# Patient Record
Sex: FEMALE | Race: WHITE | Employment: OTHER | URBAN - METROPOLITAN AREA
[De-identification: names, ages, dates, MRNs, and addresses within clinical notes are randomized per-mention and may not be internally consistent; named-entity substitution may affect disease eponyms.]

---

## 2018-08-15 ENCOUNTER — HOSPITAL ENCOUNTER (OUTPATIENT)
Age: 68
Discharge: HOME | End: 2018-08-17
Payer: MEDICARE

## 2018-08-15 ENCOUNTER — HOSPITAL ENCOUNTER (OUTPATIENT)
Dept: GENERAL RADIOLOGY | Age: 68
Discharge: HOME | End: 2018-08-17
Payer: MEDICARE

## 2018-08-15 DIAGNOSIS — M54.40 ACUTE RIGHT-SIDED LOW BACK PAIN WITH SCIATICA, SCIATICA LATERALITY UNSPECIFIED: ICD-10-CM

## 2018-08-15 PROCEDURE — 72110 X-RAY EXAM L-2 SPINE 4/>VWS: CPT

## 2021-03-09 ENCOUNTER — IMMUNIZATION (OUTPATIENT)
Dept: PRIMARY CARE CLINIC | Age: 71
End: 2021-03-09
Payer: MEDICARE

## 2021-03-09 PROCEDURE — 0011A COVID-19, MODERNA VACCINE 100MCG/0.5ML DOSE: CPT | Performed by: NURSE PRACTITIONER

## 2021-03-09 PROCEDURE — 91301 COVID-19, MODERNA VACCINE 100MCG/0.5ML DOSE: CPT | Performed by: NURSE PRACTITIONER

## 2021-04-06 ENCOUNTER — IMMUNIZATION (OUTPATIENT)
Dept: PRIMARY CARE CLINIC | Age: 71
End: 2021-04-06
Payer: MEDICARE

## 2021-04-06 PROCEDURE — 91301 COVID-19, MODERNA VACCINE 100MCG/0.5ML DOSE: CPT | Performed by: NURSE PRACTITIONER

## 2021-04-06 PROCEDURE — 0012A COVID-19, MODERNA VACCINE 100MCG/0.5ML DOSE: CPT | Performed by: NURSE PRACTITIONER

## 2022-11-09 ENCOUNTER — HOSPITAL ENCOUNTER (EMERGENCY)
Age: 72
Discharge: HOME | End: 2022-11-09
Attending: EMERGENCY MEDICINE
Payer: MEDICARE

## 2022-11-09 ENCOUNTER — APPOINTMENT (OUTPATIENT)
Dept: CT IMAGING | Age: 72
End: 2022-11-09
Payer: MEDICARE

## 2022-11-09 ENCOUNTER — APPOINTMENT (OUTPATIENT)
Dept: GENERAL RADIOLOGY | Age: 72
End: 2022-11-09
Payer: MEDICARE

## 2022-11-09 VITALS
OXYGEN SATURATION: 97 % | HEART RATE: 90 BPM | WEIGHT: 130 LBS | SYSTOLIC BLOOD PRESSURE: 134 MMHG | HEIGHT: 64 IN | TEMPERATURE: 97.4 F | DIASTOLIC BLOOD PRESSURE: 74 MMHG | BODY MASS INDEX: 22.2 KG/M2 | RESPIRATION RATE: 14 BRPM

## 2022-11-09 DIAGNOSIS — S39.012A STRAIN OF LUMBAR REGION, INITIAL ENCOUNTER: Primary | ICD-10-CM

## 2022-11-09 DIAGNOSIS — M51.36 DDD (DEGENERATIVE DISC DISEASE), LUMBAR: ICD-10-CM

## 2022-11-09 LAB
BILIRUBIN URINE: ABNORMAL
BLOOD, URINE: NEGATIVE
CLARITY: CLEAR
COLOR: YELLOW
GLUCOSE URINE: NEGATIVE MG/DL
KETONES, URINE: >=80 MG/DL
LEUKOCYTE ESTERASE, URINE: NEGATIVE
NITRITE, URINE: NEGATIVE
PH UA: 6 (ref 5–9)
PROTEIN UA: NEGATIVE MG/DL
SPECIFIC GRAVITY UA: >=1.03 (ref 1–1.03)
UROBILINOGEN, URINE: 0.2 E.U./DL

## 2022-11-09 PROCEDURE — 81003 URINALYSIS AUTO W/O SCOPE: CPT

## 2022-11-09 PROCEDURE — 87088 URINE BACTERIA CULTURE: CPT

## 2022-11-09 PROCEDURE — 72131 CT LUMBAR SPINE W/O DYE: CPT

## 2022-11-09 PROCEDURE — 96372 THER/PROPH/DIAG INJ SC/IM: CPT

## 2022-11-09 PROCEDURE — 73502 X-RAY EXAM HIP UNI 2-3 VIEWS: CPT

## 2022-11-09 PROCEDURE — 74176 CT ABD & PELVIS W/O CONTRAST: CPT

## 2022-11-09 PROCEDURE — 99284 EMERGENCY DEPT VISIT MOD MDM: CPT

## 2022-11-09 PROCEDURE — 6370000000 HC RX 637 (ALT 250 FOR IP)

## 2022-11-09 PROCEDURE — 6360000002 HC RX W HCPCS

## 2022-11-09 RX ORDER — MELOXICAM 7.5 MG/1
7.5 TABLET ORAL DAILY
Qty: 90 TABLET | Refills: 0 | Status: SHIPPED | OUTPATIENT
Start: 2022-11-09 | End: 2022-11-22

## 2022-11-09 RX ORDER — ORPHENADRINE CITRATE 100 MG/1
100 TABLET, EXTENDED RELEASE ORAL 2 TIMES DAILY
Qty: 20 TABLET | Refills: 0 | Status: SHIPPED | OUTPATIENT
Start: 2022-11-09 | End: 2022-11-19

## 2022-11-09 RX ORDER — HYDROCODONE BITARTRATE AND ACETAMINOPHEN 5; 325 MG/1; MG/1
1 TABLET ORAL ONCE
Status: COMPLETED | OUTPATIENT
Start: 2022-11-09 | End: 2022-11-09

## 2022-11-09 RX ORDER — KETOROLAC TROMETHAMINE 30 MG/ML
15 INJECTION, SOLUTION INTRAMUSCULAR; INTRAVENOUS ONCE
Status: COMPLETED | OUTPATIENT
Start: 2022-11-09 | End: 2022-11-09

## 2022-11-09 RX ORDER — FENTANYL CITRATE 50 UG/ML
25 INJECTION, SOLUTION INTRAMUSCULAR; INTRAVENOUS ONCE
Status: DISCONTINUED | OUTPATIENT
Start: 2022-11-09 | End: 2022-11-09

## 2022-11-09 RX ORDER — METHYLPREDNISOLONE 4 MG/1
TABLET ORAL
Qty: 1 KIT | Refills: 0 | Status: SHIPPED | OUTPATIENT
Start: 2022-11-09 | End: 2022-11-15

## 2022-11-09 RX ADMIN — HYDROCODONE BITARTRATE AND ACETAMINOPHEN 1 TABLET: 5; 325 TABLET ORAL at 18:16

## 2022-11-09 RX ADMIN — KETOROLAC TROMETHAMINE 15 MG: 30 INJECTION, SOLUTION INTRAMUSCULAR; INTRAVENOUS at 18:16

## 2022-11-09 ASSESSMENT — ENCOUNTER SYMPTOMS
BACK PAIN: 0
SINUS PAIN: 0
ABDOMINAL PAIN: 0
ABDOMINAL DISTENTION: 0
EYE DISCHARGE: 0
EYE ITCHING: 0
APNEA: 0
CHEST TIGHTNESS: 0

## 2022-11-09 ASSESSMENT — PAIN SCALES - GENERAL: PAINLEVEL_OUTOF10: 8

## 2022-11-09 NOTE — ED TRIAGE NOTES
FIRST PROVIDER CONTACT ASSESSMENT NOTE       Department of Emergency Medicine                 First Provider Note            22  1:27 PM EST    Date of Encounter: No admission date for patient encounter. Patient Name: Rylan Beltran  : 1950  MRN: [id number]    Chief Complaint: Back Pain (Lower back pain, chronic pain, worse today), Fatigue, Difficulty Walking, and Constipation (Last BM 2 days ago)      History of Present Illness:   Rylan Beltran is a 66 y.o. female who presents to the ED for low back pain. Right sided low back pain, onset a month ago, now worse today. Focused Physical Exam:  VS:    ED Triage Vitals   BP Temp Temp src Pulse Resp SpO2 Height Weight   -- -- -- -- -- -- -- --        Physical Ex: Constitutional: Alert and non-toxic. Medical History:  has a past medical history of Hyperlipidemia. Surgical History:  has no past surgical history on file. Social History:    Family History: family history includes Heart Disease in her father. Allergies: Patient has no known allergies.      Initial Plan of Care: Initiate Treatment-Testing, Proceed toTreatment Area When Bed Available for ED Attending/MLP to Continue Care      ---END OF FIRST PROVIDER CONTACT ASSESSMENT NOTE---  Electronically signed by Azael Oconnor PA-C   DD: 22

## 2022-11-09 NOTE — ED PROVIDER NOTES
Feliz Florentino 66 y.o. female PHMx of chronic low back pain, osteoarthritis, GERD, chronic constipation with bowel movements every other day presents to the ED c/o worsening right lower back pain and not having a bowel movement past 2 days. Onset: Past several days. Location/Radiation: Right lower back. Duration: Intermittent. Characterization: She describes it just as a pain, she states \"it just hurts. \" Aggravating Factors: Hip extension/flexion. Relieving Factors: Sitting partially up with some pillows. Severity: When not moving the hip no pain, when moving the hip around pain is severe. Assx Sxs: Constipation, hip pain, right lower back pain,. She Denies: Chest pain/shortness of breath, fever/chills, numbness/tingling, paresthesia around the thighs or lower extremities, loss of bowel/bladder function, trauma/falls. Review of Systems   Constitutional:  Positive for fatigue. Negative for activity change, appetite change, chills and fever. HENT:  Negative for congestion and sinus pain. Eyes:  Negative for discharge and itching. Respiratory:  Negative for apnea and chest tightness. Cardiovascular:  Negative for chest pain and leg swelling. Gastrointestinal:  Negative for abdominal distention and abdominal pain. Endocrine: Negative for cold intolerance and heat intolerance. Genitourinary:  Negative for dysuria and flank pain. Musculoskeletal:  Negative for arthralgias and back pain. Allergic/Immunologic: Negative for environmental allergies and food allergies. Neurological:  Negative for dizziness and facial asymmetry. Hematological:  Negative for adenopathy. Does not bruise/bleed easily. Psychiatric/Behavioral:  Negative for agitation and behavioral problems. Physical Exam  Constitutional:       General: She is not in acute distress. Appearance: Normal appearance. She is normal weight. She is not ill-appearing, toxic-appearing or diaphoretic.    HENT:      Head: Normocephalic and atraumatic. Right Ear: External ear normal.      Left Ear: External ear normal.      Nose: Nose normal.      Mouth/Throat:      Mouth: Mucous membranes are moist.      Pharynx: Oropharynx is clear. Eyes:      Extraocular Movements: Extraocular movements intact. Pupils: Pupils are equal, round, and reactive to light. Cardiovascular:      Rate and Rhythm: Regular rhythm. Tachycardia present. Pulses: Normal pulses. Heart sounds: Normal heart sounds. Pulmonary:      Effort: Pulmonary effort is normal. No respiratory distress. Breath sounds: Normal breath sounds. No wheezing or rales. Abdominal:      General: There is no distension. Tenderness: There is no abdominal tenderness. Musculoskeletal:         General: Normal range of motion. Cervical back: Normal range of motion and neck supple. Right lower leg: No edema. Left lower leg: No edema. Skin:     General: Skin is warm and dry. Capillary Refill: Capillary refill takes less than 2 seconds. Neurological:      General: No focal deficit present. Mental Status: She is alert and oriented to person, place, and time. Cranial Nerves: No cranial nerve deficit. Motor: No weakness. Psychiatric:         Mood and Affect: Mood normal.         Thought Content: Thought content normal.        Procedures     MDM  Number of Diagnoses or Management Options  DDD (degenerative disc disease), lumbar  Strain of lumbar region, initial encounter  Diagnosis management comments: This is a pleasant 65-year-old female that presents to the ER complaining of chronic right sided lower back pain. Upon evaluation and physical examination she was AOx4, protecting her airway, normotensive, heart rate was 114, nonhypoxic on room air. Cranial nerves II through XII grossly intact, 5 out of 5 motor strength in her lower and upper extremities. Full sensation intact. 2+ pulses in her lower extremities.   No fever, red flag signs or symptoms or objective signs of cauda equina syndrome. She reports that she is has complete bowel and bladder function. No paresthesia in between the thighs. CT abdomen pelvis without contrast and CT lumbar spine without contrast showed multiple compression deformities in the lower thoracic and lumbar spine of indeterminate age, no associated paravertebral soft tissue thickening. Due to her age and clinical history these are likely very chronic osteoarthritis degenerative changes of the spine. Hip x-ray on the right was unremarkable. Patient was given 15 mg injection of Toradol, and 1 pain pill. She ambulated the hallways without issue. Her repeat vitals check were normal, normal heart rate at 90. She reported that she was ready to go home and she was given orthopedic surgery/neurosurgery follow-up. She reported that she likely does want to see her 's doctor. Return precautions ER given. She and her  verbally consents agreed to the plan. She was prescribed outpatient medication of Medrol Dosepak, and muscle relaxer. Patient was normotensive normal heart rate at time of discharge. ED Course as of 11/09/22 1915 Wed Nov 09, 2022 1911 Patient ambulated the hallways with her cane without issue. No abnormalities in her gait. She was given pain medicine and her heart rate was 90. [JR]      ED Course User Index  [JR] Kelsey Cleary DO         ED Course as of 11/09/22 1915 Wed Nov 09, 2022 1911 Patient ambulated the hallways with her cane without issue. No abnormalities in her gait. She was given pain medicine and her heart rate was 90. [JR]      ED Course User Index  [JR] Kelsey Cleary DO       --------------------------------------------- PAST HISTORY ---------------------------------------------  Past Medical History:  has a past medical history of Hyperlipidemia. Past Surgical History:  has no past surgical history on file.     Social History: Family History: family history includes Heart Disease in her father. The patients home medications have been reviewed. Allergies: Patient has no known allergies. -------------------------------------------------- RESULTS -------------------------------------------------  Labs:  Results for orders placed or performed during the hospital encounter of 11/09/22   Urinalysis   Result Value Ref Range    Color, UA Yellow Straw/Yellow    Clarity, UA Clear Clear    Glucose, Ur Negative Negative mg/dL    Bilirubin Urine SMALL (A) Negative    Ketones, Urine >=80 (A) Negative mg/dL    Specific Gravity, UA >=1.030 1.005 - 1.030    Blood, Urine Negative Negative    pH, UA 6.0 5.0 - 9.0    Protein, UA Negative Negative mg/dL    Urobilinogen, Urine 0.2 <2.0 E.U./dL    Nitrite, Urine Negative Negative    Leukocyte Esterase, Urine Negative Negative       Radiology:  XR HIP RIGHT (2-3 VIEWS)   Final Result   Unremarkable right hip. CT LUMBAR SPINE WO CONTRAST   Final Result   1. Multiple compression deformities within the lower thoracic and lumbar   spine of indeterminate age. There is no associated paravertebral soft tissue   thickening. 2. Degenerative changes in the lumbar spine without definite central canal   stenosis. There is probable multilevel neural foraminal narrowing. 3. Possible punctate nonobstructive calculus in the lower pole left kidney. CT ABDOMEN PELVIS WO CONTRAST   Final Result   1. Multiple compression deformities within the lower thoracic and lumbar   spine of indeterminate age. There is no associated paravertebral soft tissue   thickening. 2. Degenerative changes in the lumbar spine without definite central canal   stenosis. There is probable multilevel neural foraminal narrowing. 3. Possible punctate nonobstructive calculus in the lower pole left kidney.              ------------------------- NURSING NOTES AND VITALS REVIEWED ---------------------------  Date / Time Roomed:  11/9/2022  3:01 PM  ED Bed Assignment:  [id number]    The nursing notes within the ED encounter and vital signs as below have been reviewed. /74   Pulse 90   Temp 97.4 °F (36.3 °C) (Temporal)   Resp 14   Ht 5' 4\" (1.626 m)   Wt 130 lb (59 kg)   SpO2 97%   BMI 22.31 kg/m²   Oxygen Saturation Interpretation: Normal      ------------------------------------------ PROGRESS NOTES ------------------------------------------  7:12 PM EST  I have spoken with the patient and discussed todays results, in addition to providing specific details for the plan of care and counseling regarding the diagnosis and prognosis. Their questions are answered at this time and they are agreeable with the plan. I discussed at length with them reasons for immediate return here for re evaluation. They will followup with their and the on call primary care physician and orthopedic physician by calling their office tomorrow. --------------------------------- ADDITIONAL PROVIDER NOTES ---------------------------------  At this time the patient is without objective evidence of an acute process requiring hospitalization or inpatient management. They have remained hemodynamically stable throughout their entire ED visit and are stable for discharge with outpatient follow-up. The plan has been discussed in detail and they are aware of the specific conditions for emergent return, as well as the importance of follow-up. New Prescriptions    MELOXICAM (MOBIC) 7.5 MG TABLET    Take 1 tablet by mouth daily    METHYLPREDNISOLONE (MEDROL, CAMILO,) 4 MG TABLET    Take by mouth. ORPHENADRINE (NORFLEX) 100 MG EXTENDED RELEASE TABLET    Take 1 tablet by mouth 2 times daily for 10 days       Diagnosis:  1. Strain of lumbar region, initial encounter    2. DDD (degenerative disc disease), lumbar        Disposition:  Patient's disposition: Discharge to home  Patient's condition is stable.       Katja Hart, DO  Resident  11/09/22 3883

## 2022-11-09 NOTE — ED NOTES
Pt up and ambulated in amaya with Rn and tolerated well reported some soreness and stiffness. Rest of assesment unremarkable.   Dr notified     Jag Tejada RN  11/09/22 2397

## 2022-11-12 LAB — URINE CULTURE, ROUTINE: NORMAL

## 2022-11-17 ENCOUNTER — HOSPITAL ENCOUNTER (EMERGENCY)
Age: 72
Discharge: HOME | End: 2022-11-17
Attending: STUDENT IN AN ORGANIZED HEALTH CARE EDUCATION/TRAINING PROGRAM
Payer: MEDICARE

## 2022-11-17 ENCOUNTER — APPOINTMENT (OUTPATIENT)
Dept: GENERAL RADIOLOGY | Age: 72
End: 2022-11-17
Payer: MEDICARE

## 2022-11-17 VITALS
HEART RATE: 94 BPM | WEIGHT: 130 LBS | BODY MASS INDEX: 22.31 KG/M2 | RESPIRATION RATE: 18 BRPM | TEMPERATURE: 97.9 F | OXYGEN SATURATION: 99 % | DIASTOLIC BLOOD PRESSURE: 98 MMHG | SYSTOLIC BLOOD PRESSURE: 138 MMHG

## 2022-11-17 DIAGNOSIS — E86.0 DEHYDRATION: ICD-10-CM

## 2022-11-17 DIAGNOSIS — K59.00 CONSTIPATION, UNSPECIFIED CONSTIPATION TYPE: Primary | ICD-10-CM

## 2022-11-17 LAB
ALBUMIN SERPL-MCNC: 4.4 G/DL (ref 3.5–5.2)
ALP BLD-CCNC: 102 U/L (ref 35–104)
ALT SERPL-CCNC: 9 U/L (ref 0–32)
ANION GAP SERPL CALCULATED.3IONS-SCNC: 20 MMOL/L (ref 7–16)
AST SERPL-CCNC: 15 U/L (ref 0–31)
BASOPHILS ABSOLUTE: 0.03 E9/L (ref 0–0.2)
BASOPHILS RELATIVE PERCENT: 0.2 % (ref 0–2)
BILIRUB SERPL-MCNC: 0.4 MG/DL (ref 0–1.2)
BUN BLDV-MCNC: 14 MG/DL (ref 6–23)
CALCIUM SERPL-MCNC: 10.6 MG/DL (ref 8.6–10.2)
CHLORIDE BLD-SCNC: 95 MMOL/L (ref 98–107)
CO2: 23 MMOL/L (ref 22–29)
CREAT SERPL-MCNC: 0.7 MG/DL (ref 0.5–1)
EOSINOPHILS ABSOLUTE: 0.06 E9/L (ref 0.05–0.5)
EOSINOPHILS RELATIVE PERCENT: 0.5 % (ref 0–6)
GFR SERPL CREATININE-BSD FRML MDRD: >60 ML/MIN/1.73
GLUCOSE BLD-MCNC: 87 MG/DL (ref 74–99)
HCT VFR BLD CALC: 52 % (ref 34–48)
HEMOGLOBIN: 17.2 G/DL (ref 11.5–15.5)
IMMATURE GRANULOCYTES #: 0.12 E9/L
IMMATURE GRANULOCYTES %: 1 % (ref 0–5)
LIPASE: 19 U/L (ref 13–60)
LYMPHOCYTES ABSOLUTE: 0.77 E9/L (ref 1.5–4)
LYMPHOCYTES RELATIVE PERCENT: 6.4 % (ref 20–42)
MAGNESIUM: 2.6 MG/DL (ref 1.6–2.6)
MCH RBC QN AUTO: 30 PG (ref 26–35)
MCHC RBC AUTO-ENTMCNC: 33.1 % (ref 32–34.5)
MCV RBC AUTO: 90.8 FL (ref 80–99.9)
MONOCYTES ABSOLUTE: 1.11 E9/L (ref 0.1–0.95)
MONOCYTES RELATIVE PERCENT: 9.2 % (ref 2–12)
NEUTROPHILS ABSOLUTE: 9.96 E9/L (ref 1.8–7.3)
NEUTROPHILS RELATIVE PERCENT: 82.7 % (ref 43–80)
PDW BLD-RTO: 13.5 FL (ref 11.5–15)
PLATELET # BLD: 345 E9/L (ref 130–450)
PMV BLD AUTO: 9.4 FL (ref 7–12)
POTASSIUM REFLEX MAGNESIUM: 3.1 MMOL/L (ref 3.5–5)
RBC # BLD: 5.73 E12/L (ref 3.5–5.5)
SODIUM BLD-SCNC: 138 MMOL/L (ref 132–146)
TOTAL PROTEIN: 7.7 G/DL (ref 6.4–8.3)
WBC # BLD: 12.1 E9/L (ref 4.5–11.5)

## 2022-11-17 PROCEDURE — 6360000002 HC RX W HCPCS: Performed by: EMERGENCY MEDICINE

## 2022-11-17 PROCEDURE — 96374 THER/PROPH/DIAG INJ IV PUSH: CPT

## 2022-11-17 PROCEDURE — 83690 ASSAY OF LIPASE: CPT

## 2022-11-17 PROCEDURE — 99284 EMERGENCY DEPT VISIT MOD MDM: CPT

## 2022-11-17 PROCEDURE — 96361 HYDRATE IV INFUSION ADD-ON: CPT

## 2022-11-17 PROCEDURE — 74018 RADEX ABDOMEN 1 VIEW: CPT

## 2022-11-17 PROCEDURE — 80053 COMPREHEN METABOLIC PANEL: CPT

## 2022-11-17 PROCEDURE — 83735 ASSAY OF MAGNESIUM: CPT

## 2022-11-17 PROCEDURE — 2580000003 HC RX 258: Performed by: EMERGENCY MEDICINE

## 2022-11-17 PROCEDURE — 85025 COMPLETE CBC W/AUTO DIFF WBC: CPT

## 2022-11-17 RX ORDER — SODIUM CHLORIDE, SODIUM LACTATE, POTASSIUM CHLORIDE, AND CALCIUM CHLORIDE .6; .31; .03; .02 G/100ML; G/100ML; G/100ML; G/100ML
1000 INJECTION, SOLUTION INTRAVENOUS ONCE
Status: COMPLETED | OUTPATIENT
Start: 2022-11-17 | End: 2022-11-17

## 2022-11-17 RX ORDER — DOCUSATE SODIUM 100 MG/1
100 CAPSULE, LIQUID FILLED ORAL 2 TIMES DAILY
Qty: 14 CAPSULE | Refills: 0 | Status: SHIPPED | OUTPATIENT
Start: 2022-11-17 | End: 2022-11-22

## 2022-11-17 RX ORDER — KETOROLAC TROMETHAMINE 30 MG/ML
15 INJECTION, SOLUTION INTRAMUSCULAR; INTRAVENOUS ONCE
Status: COMPLETED | OUTPATIENT
Start: 2022-11-17 | End: 2022-11-17

## 2022-11-17 RX ORDER — 0.9 % SODIUM CHLORIDE 0.9 %
1000 INTRAVENOUS SOLUTION INTRAVENOUS ONCE
Status: COMPLETED | OUTPATIENT
Start: 2022-11-17 | End: 2022-11-17

## 2022-11-17 RX ADMIN — KETOROLAC TROMETHAMINE 15 MG: 30 INJECTION, SOLUTION INTRAMUSCULAR at 11:49

## 2022-11-17 RX ADMIN — SODIUM CHLORIDE 1000 ML: 9 INJECTION, SOLUTION INTRAVENOUS at 13:03

## 2022-11-17 RX ADMIN — SODIUM CHLORIDE, POTASSIUM CHLORIDE, SODIUM LACTATE AND CALCIUM CHLORIDE 1000 ML: 600; 310; 30; 20 INJECTION, SOLUTION INTRAVENOUS at 11:51

## 2022-11-17 ASSESSMENT — PAIN SCALES - GENERAL
PAINLEVEL_OUTOF10: 6
PAINLEVEL_OUTOF10: 8

## 2022-11-17 ASSESSMENT — PAIN - FUNCTIONAL ASSESSMENT
PAIN_FUNCTIONAL_ASSESSMENT: 0-10
PAIN_FUNCTIONAL_ASSESSMENT: 0-10

## 2022-11-17 ASSESSMENT — PAIN DESCRIPTION - LOCATION
LOCATION: BACK
LOCATION: BACK

## 2022-11-17 ASSESSMENT — LIFESTYLE VARIABLES
HOW MANY STANDARD DRINKS CONTAINING ALCOHOL DO YOU HAVE ON A TYPICAL DAY: PATIENT DOES NOT DRINK
HOW OFTEN DO YOU HAVE A DRINK CONTAINING ALCOHOL: NEVER

## 2022-11-17 NOTE — ED PROVIDER NOTES
HPI:  11/17/22,   Time: 10:27 AM KIM Clemente is a 67 y.o. female presenting to the ED for evaluation of lower abdominal pain that is been present intermittently for the past week. Patient denies headache, sore throat, neck pain, back pain, chest pain, shortness of breath, cough, hemoptysis, extremity numbness or weakness or rash. Seen in the emergency department proxy 1 week ago. She was placed on steroids, NSAIDs and muscle relaxer. She states her back pain is not improved. She was diagnosed with a herniated disc. She states she been experiencing this pain for approximately 2 months and it has not changed. She denies fevers or chills. She states she is constipated. Her pain is an aching sensation in her lower abdomen which she attributes to constipation. It is not worse with movement. She has a decreased appetite and feels like she has a dry mouth. ROS:   Pertinent positives and negatives are stated within HPI, all other systems reviewed and are negative.  --------------------------------------------- PAST HISTORY ---------------------------------------------  Past Medical History:  has a past medical history of Bruising tendency and Hyperlipidemia. Past Surgical History:  has no past surgical history on file. Social History:  reports that she has never smoked. She has never used smokeless tobacco. She reports that she does not drink alcohol and does not use drugs. Family History: family history includes Arthritis in her mother; Heart Disease in her father; Lupus in her mother; Stroke in her father. The patients home medications have been reviewed.     Allergies: Latex    -------------------------------------------------- RESULTS -------------------------------------------------  All laboratory and radiology results have been personally reviewed by myself   LABS:  Results for orders placed or performed during the hospital encounter of 11/17/22   CBC with Auto Differential   Result Value Ref Range    WBC 12.1 (H) 4.5 - 11.5 E9/L    RBC 5.73 (H) 3.50 - 5.50 E12/L    Hemoglobin 17.2 (H) 11.5 - 15.5 g/dL    Hematocrit 52.0 (H) 34.0 - 48.0 %    MCV 90.8 80.0 - 99.9 fL    MCH 30.0 26.0 - 35.0 pg    MCHC 33.1 32.0 - 34.5 %    RDW 13.5 11.5 - 15.0 fL    Platelets 007 464 - 272 E9/L    MPV 9.4 7.0 - 12.0 fL    Neutrophils % 82.7 (H) 43.0 - 80.0 %    Immature Granulocytes % 1.0 0.0 - 5.0 %    Lymphocytes % 6.4 (L) 20.0 - 42.0 %    Monocytes % 9.2 2.0 - 12.0 %    Eosinophils % 0.5 0.0 - 6.0 %    Basophils % 0.2 0.0 - 2.0 %    Neutrophils Absolute 9.96 (H) 1.80 - 7.30 E9/L    Immature Granulocytes # 0.12 E9/L    Lymphocytes Absolute 0.77 (L) 1.50 - 4.00 E9/L    Monocytes Absolute 1.11 (H) 0.10 - 0.95 E9/L    Eosinophils Absolute 0.06 0.05 - 0.50 E9/L    Basophils Absolute 0.03 0.00 - 0.20 E9/L   Comprehensive Metabolic Panel w/ Reflex to MG   Result Value Ref Range    Sodium 138 132 - 146 mmol/L    Potassium reflex Magnesium 3.1 (L) 3.5 - 5.0 mmol/L    Chloride 95 (L) 98 - 107 mmol/L    CO2 23 22 - 29 mmol/L    Anion Gap 20 (H) 7 - 16 mmol/L    Glucose 87 74 - 99 mg/dL    BUN 14 6 - 23 mg/dL    Creatinine 0.7 0.5 - 1.0 mg/dL    Est, Glom Filt Rate >60 >=60 mL/min/1.73    Calcium 10.6 (H) 8.6 - 10.2 mg/dL    Total Protein 7.7 6.4 - 8.3 g/dL    Albumin 4.4 3.5 - 5.2 g/dL    Total Bilirubin 0.4 0.0 - 1.2 mg/dL    Alkaline Phosphatase 102 35 - 104 U/L    ALT 9 0 - 32 U/L    AST 15 0 - 31 U/L   Lipase   Result Value Ref Range    Lipase 19 13 - 60 U/L   Magnesium   Result Value Ref Range    Magnesium 2.6 1.6 - 2.6 mg/dL       RADIOLOGY:  Interpreted by Radiologist.  XR ABDOMEN (KUB) (SINGLE AP VIEW)   Final Result   Atypically large fecal load within the lower GI tract suggesting that there   is dysfunctional evacuation. Multiple vertebral compression injuries.              ------------------------- NURSING NOTES AND VITALS REVIEWED ---------------------------   The nursing notes within the ED encounter and vital signs as below have been reviewed. BP (!) 138/98   Pulse 94   Temp 97.9 °F (36.6 °C)   Resp 18   Wt 130 lb (59 kg)   SpO2 99%   BMI 22.31 kg/m²   Oxygen Saturation Interpretation: Normal      ---------------------------------------------------PHYSICAL EXAM--------------------------------------      Constitutional/General: Alert and oriented x3, well appearing, non toxic in NAD  Head: NC/AT  Eyes: PERRL, EOMI  Nose:  Nares patent. No discharge or bleeding  Ears:  TM's intact without erythema or perforation. External canal without swelling  Mouth: Oropharynx clear, handling secretions, no trismus  Neck: Supple, full ROM, no meningeal signs  Pulmonary: Lungs clear to auscultation bilaterally, no wheezes, rales, or rhonchi. Not in respiratory distress  Cardiovascular:  Regular rate and rhythm, no murmurs, gallops, or rubs. 2+ symmetric distal pulses   Chest:  No tenderness, deformity or crepitus  Abdomen: Soft with lower abdominal tenderness. No palpable masses. Back:  No tenderness to palpation on the cervical or thoracic or lumbar spine. There is mild soft tissue tenderness to palpation at the thoracolumbar junction. Extremities: Moves all extremities x 4. Warm and well perfused  Skin: warm and dry without rash  Neurologic: GCS 15, no focal acute neurological deficit  Psych: Normal Affect      ------------------------------ ED COURSE/MEDICAL DECISION MAKING----------------------  Medications   lactated ringers bolus (0 mLs IntraVENous Stopped 11/17/22 1258)   ketorolac (TORADOL) injection 15 mg (15 mg IntraVENous Given 11/17/22 1149)   0.9 % sodium chloride bolus (0 mLs IntraVENous Stopped 11/17/22 1415)         Medical Decision Making:    Symptoms markedly improved after enema and saline infusions. Will be discharged to home.  Patient advised to contact their primary care physician either today or within the next 24 hours to secure a follow-up appointment and to return to the emergency Department immediately should their symptoms recur or worsen. --------------------------------- IMPRESSION AND DISPOSITION ---------------------------------    IMPRESSION  1. Constipation, unspecified constipation type    2.  Dehydration        DISPOSITION  Disposition: Discharge to home  Patient condition is good          Zoraida Acosta DO  11/17/22 1502

## 2022-11-21 ENCOUNTER — HOSPITAL ENCOUNTER (OUTPATIENT)
Dept: MRI IMAGING | Age: 72
Discharge: HOME | End: 2022-11-23
Payer: MEDICARE

## 2022-11-21 DIAGNOSIS — S32.000A CLOSED WEDGE FRACTURE OF LUMBAR VERTEBRA, UNSPECIFIED LUMBAR VERTEBRAL LEVEL, INITIAL ENCOUNTER (HCC): ICD-10-CM

## 2022-11-21 PROCEDURE — 72148 MRI LUMBAR SPINE W/O DYE: CPT

## 2022-11-22 ENCOUNTER — HOSPITAL ENCOUNTER (OUTPATIENT)
Age: 72
Setting detail: OBSERVATION
Discharge: HOME | End: 2022-11-24
Attending: EMERGENCY MEDICINE | Admitting: INTERNAL MEDICINE
Payer: MEDICARE

## 2022-11-22 DIAGNOSIS — S32.000A LUMBAR COMPRESSION FRACTURE, CLOSED, INITIAL ENCOUNTER (HCC): Primary | ICD-10-CM

## 2022-11-22 LAB
ALBUMIN SERPL-MCNC: 4.4 G/DL (ref 3.5–5.2)
ALP BLD-CCNC: 100 U/L (ref 35–104)
ALT SERPL-CCNC: 10 U/L (ref 0–32)
ANION GAP SERPL CALCULATED.3IONS-SCNC: 24 MMOL/L (ref 7–16)
AST SERPL-CCNC: 19 U/L (ref 0–31)
BASOPHILS ABSOLUTE: 0.05 E9/L (ref 0–0.2)
BASOPHILS RELATIVE PERCENT: 0.4 % (ref 0–2)
BILIRUB SERPL-MCNC: 0.4 MG/DL (ref 0–1.2)
BILIRUBIN URINE: NEGATIVE
BLOOD, URINE: NEGATIVE
BUN BLDV-MCNC: 6 MG/DL (ref 6–23)
CALCIUM SERPL-MCNC: 10.9 MG/DL (ref 8.6–10.2)
CHLORIDE BLD-SCNC: 94 MMOL/L (ref 98–107)
CLARITY: CLEAR
CO2: 21 MMOL/L (ref 22–29)
COLOR: YELLOW
CREAT SERPL-MCNC: 0.6 MG/DL (ref 0.5–1)
EOSINOPHILS ABSOLUTE: 0.12 E9/L (ref 0.05–0.5)
EOSINOPHILS RELATIVE PERCENT: 0.9 % (ref 0–6)
GFR SERPL CREATININE-BSD FRML MDRD: >60 ML/MIN/1.73
GLUCOSE BLD-MCNC: 87 MG/DL (ref 74–99)
GLUCOSE URINE: NEGATIVE MG/DL
HCT VFR BLD CALC: 49.4 % (ref 34–48)
HEMOGLOBIN: 16.4 G/DL (ref 11.5–15.5)
IMMATURE GRANULOCYTES #: 0.06 E9/L
IMMATURE GRANULOCYTES %: 0.4 % (ref 0–5)
KETONES, URINE: >=80 MG/DL
LEUKOCYTE ESTERASE, URINE: NEGATIVE
LYMPHOCYTES ABSOLUTE: 0.94 E9/L (ref 1.5–4)
LYMPHOCYTES RELATIVE PERCENT: 6.9 % (ref 20–42)
MCH RBC QN AUTO: 30.3 PG (ref 26–35)
MCHC RBC AUTO-ENTMCNC: 33.2 % (ref 32–34.5)
MCV RBC AUTO: 91.3 FL (ref 80–99.9)
MONOCYTES ABSOLUTE: 0.82 E9/L (ref 0.1–0.95)
MONOCYTES RELATIVE PERCENT: 6 % (ref 2–12)
NEUTROPHILS ABSOLUTE: 11.69 E9/L (ref 1.8–7.3)
NEUTROPHILS RELATIVE PERCENT: 85.4 % (ref 43–80)
NITRITE, URINE: NEGATIVE
PDW BLD-RTO: 14 FL (ref 11.5–15)
PH UA: 5.5 (ref 5–9)
PLATELET # BLD: 369 E9/L (ref 130–450)
PMV BLD AUTO: 9.9 FL (ref 7–12)
POTASSIUM SERPL-SCNC: 4.2 MMOL/L (ref 3.5–5)
PROTEIN UA: NEGATIVE MG/DL
RBC # BLD: 5.41 E12/L (ref 3.5–5.5)
SODIUM BLD-SCNC: 139 MMOL/L (ref 132–146)
SPECIFIC GRAVITY UA: 1.02 (ref 1–1.03)
TOTAL PROTEIN: 8.2 G/DL (ref 6.4–8.3)
UROBILINOGEN, URINE: 0.2 E.U./DL
WBC # BLD: 13.7 E9/L (ref 4.5–11.5)

## 2022-11-22 PROCEDURE — 36415 COLL VENOUS BLD VENIPUNCTURE: CPT

## 2022-11-22 PROCEDURE — G0378 HOSPITAL OBSERVATION PER HR: HCPCS

## 2022-11-22 PROCEDURE — 96372 THER/PROPH/DIAG INJ SC/IM: CPT

## 2022-11-22 PROCEDURE — 85025 COMPLETE CBC W/AUTO DIFF WBC: CPT

## 2022-11-22 PROCEDURE — 6360000002 HC RX W HCPCS: Performed by: INTERNAL MEDICINE

## 2022-11-22 PROCEDURE — 81003 URINALYSIS AUTO W/O SCOPE: CPT

## 2022-11-22 PROCEDURE — 80053 COMPREHEN METABOLIC PANEL: CPT

## 2022-11-22 PROCEDURE — 99285 EMERGENCY DEPT VISIT HI MDM: CPT

## 2022-11-22 PROCEDURE — 6370000000 HC RX 637 (ALT 250 FOR IP): Performed by: INTERNAL MEDICINE

## 2022-11-22 RX ORDER — AMLODIPINE BESYLATE 5 MG/1
5 TABLET ORAL DAILY
Status: DISCONTINUED | OUTPATIENT
Start: 2022-11-22 | End: 2022-11-24 | Stop reason: HOSPADM

## 2022-11-22 RX ORDER — POLYETHYLENE GLYCOL 3350 17 G/17G
17 POWDER, FOR SOLUTION ORAL DAILY PRN
Status: DISCONTINUED | OUTPATIENT
Start: 2022-11-22 | End: 2022-11-24 | Stop reason: HOSPADM

## 2022-11-22 RX ORDER — SODIUM CHLORIDE 0.9 % (FLUSH) 0.9 %
5-40 SYRINGE (ML) INJECTION EVERY 12 HOURS SCHEDULED
Status: DISCONTINUED | OUTPATIENT
Start: 2022-11-22 | End: 2022-11-24 | Stop reason: HOSPADM

## 2022-11-22 RX ORDER — ACETAMINOPHEN 325 MG/1
650 TABLET ORAL EVERY 6 HOURS PRN
Status: DISCONTINUED | OUTPATIENT
Start: 2022-11-22 | End: 2022-11-24 | Stop reason: HOSPADM

## 2022-11-22 RX ORDER — MORPHINE SULFATE 2 MG/ML
2 INJECTION, SOLUTION INTRAMUSCULAR; INTRAVENOUS EVERY 4 HOURS PRN
Status: DISCONTINUED | OUTPATIENT
Start: 2022-11-22 | End: 2022-11-24 | Stop reason: HOSPADM

## 2022-11-22 RX ORDER — ACETAMINOPHEN 650 MG/1
650 SUPPOSITORY RECTAL EVERY 6 HOURS PRN
Status: DISCONTINUED | OUTPATIENT
Start: 2022-11-22 | End: 2022-11-24 | Stop reason: HOSPADM

## 2022-11-22 RX ORDER — SODIUM CHLORIDE 9 MG/ML
INJECTION, SOLUTION INTRAVENOUS PRN
Status: DISCONTINUED | OUTPATIENT
Start: 2022-11-22 | End: 2022-11-24 | Stop reason: HOSPADM

## 2022-11-22 RX ORDER — ASPIRIN/ACETAMINOPHEN/CAFFEINE 227-194-33
1 TABLET ORAL EVERY 6 HOURS
Status: ON HOLD | COMMUNITY
End: 2022-11-24 | Stop reason: HOSPADM

## 2022-11-22 RX ORDER — ENOXAPARIN SODIUM 100 MG/ML
40 INJECTION SUBCUTANEOUS DAILY
Status: DISCONTINUED | OUTPATIENT
Start: 2022-11-22 | End: 2022-11-24 | Stop reason: HOSPADM

## 2022-11-22 RX ORDER — ONDANSETRON 4 MG/1
4 TABLET, ORALLY DISINTEGRATING ORAL EVERY 8 HOURS PRN
Status: DISCONTINUED | OUTPATIENT
Start: 2022-11-22 | End: 2022-11-24 | Stop reason: HOSPADM

## 2022-11-22 RX ORDER — SODIUM CHLORIDE 0.9 % (FLUSH) 0.9 %
5-40 SYRINGE (ML) INJECTION PRN
Status: DISCONTINUED | OUTPATIENT
Start: 2022-11-22 | End: 2022-11-24 | Stop reason: HOSPADM

## 2022-11-22 RX ORDER — HYDROCODONE BITARTRATE AND ACETAMINOPHEN 5; 325 MG/1; MG/1
1 TABLET ORAL EVERY 6 HOURS PRN
Status: DISCONTINUED | OUTPATIENT
Start: 2022-11-22 | End: 2022-11-24 | Stop reason: HOSPADM

## 2022-11-22 RX ORDER — ONDANSETRON 2 MG/ML
4 INJECTION INTRAMUSCULAR; INTRAVENOUS EVERY 6 HOURS PRN
Status: DISCONTINUED | OUTPATIENT
Start: 2022-11-22 | End: 2022-11-24 | Stop reason: HOSPADM

## 2022-11-22 RX ADMIN — AMLODIPINE BESYLATE 5 MG: 5 TABLET ORAL at 17:39

## 2022-11-22 RX ADMIN — HYDROCODONE BITARTRATE AND ACETAMINOPHEN 1 TABLET: 5; 325 TABLET ORAL at 17:46

## 2022-11-22 RX ADMIN — ENOXAPARIN SODIUM 40 MG: 100 INJECTION SUBCUTANEOUS at 17:39

## 2022-11-22 ASSESSMENT — ENCOUNTER SYMPTOMS
BACK PAIN: 1
DIARRHEA: 0
SHORTNESS OF BREATH: 0
NAUSEA: 0
ABDOMINAL PAIN: 0
COUGH: 0
SORE THROAT: 0
RHINORRHEA: 0
CONSTIPATION: 1
VOMITING: 0
WHEEZING: 0

## 2022-11-22 ASSESSMENT — PAIN SCALES - GENERAL: PAINLEVEL_OUTOF10: 7

## 2022-11-22 ASSESSMENT — PAIN DESCRIPTION - LOCATION: LOCATION: BACK

## 2022-11-22 ASSESSMENT — PAIN DESCRIPTION - DESCRIPTORS: DESCRIPTORS: DISCOMFORT;TIGHTNESS;ACHING

## 2022-11-22 ASSESSMENT — PAIN DESCRIPTION - ORIENTATION: ORIENTATION: MID;LOWER

## 2022-11-22 NOTE — ED PROVIDER NOTES
[facility]  ED Provider Note  Department of Emergency Medicine     ED Room:       Written by: Mau Chen DO  Patient Name: Filipe Bowden  Attending Provider: Claudeen Sack, MD  Admit Date: 2022  1:06 PM  MRN: [id number]    : 1950        Chief Complaint   Patient presents with    Fracture     Compression fractures lower back     - Chief complaint    HPI   Filipe Bowden is a 67 y.o. female presenting to the ED for evaluation of Fracture (Compression fractures lower back )    History obtained from the patient and chart review. Patient is a 69-year-old female presenting to the ED for evaluation of low back pain worsening over the past several weeks; she has been evaluated now 3 times in this ER for the symptoms and has also intermittently had constipation due to difficulty straining with a bowel movement because of her back pain. She has compression fractures of L1 and L2 and had an outpatient MRI that showed that these fractures were subacute and have progressed compared to a recent CT a couple weeks ago. She did not have any falls or traumatic injuries. She does not have any numbness or tingling anywhere. States pain is worsened with standing up straight. It is improved with laying flat. Patient used to be able to get around without assistance and now has to use a cane or walker over the past several weeks. Patient denies any abnormal urinary symptoms, fevers, cough or shortness of breath, history of IV drug use, cancer, abdominal pain, nausea or vomiting, diarrhea, black or bloody stools. Her complaints have been waxing and waning but gradually worsening over the past 2 weeks, are severe in severity, minimally improved with generic OTC NSAID. Currently while laying flat she states her symptoms are improved. Review of Systems   Constitutional:  Negative for chills and fever. HENT:  Negative for rhinorrhea and sore throat.     Eyes:  Negative for visual disturbance. Respiratory:  Negative for cough, shortness of breath and wheezing. Cardiovascular:  Negative for chest pain and palpitations. Gastrointestinal:  Positive for constipation. Negative for abdominal pain, diarrhea, nausea and vomiting. Genitourinary:  Negative for dysuria and frequency. Musculoskeletal:  Positive for back pain (Lower). Negative for myalgias. Skin:  Negative for rash and wound. Neurological:  Negative for weakness, numbness and headaches. Psychiatric/Behavioral:  Negative for confusion. All other systems reviewed and are negative. Physical Exam  Vitals and nursing note reviewed. Constitutional:       General: She is not in acute distress. Appearance: She is not toxic-appearing. HENT:      Head: Normocephalic and atraumatic. Right Ear: External ear normal.      Left Ear: External ear normal.      Nose: Nose normal. No rhinorrhea. Mouth/Throat:      Mouth: Mucous membranes are moist.      Pharynx: Oropharynx is clear. Eyes:      Extraocular Movements: Extraocular movements intact. Conjunctiva/sclera: Conjunctivae normal.      Pupils: Pupils are equal, round, and reactive to light. Cardiovascular:      Rate and Rhythm: Normal rate and regular rhythm. Pulses: Normal pulses. Heart sounds: Normal heart sounds. Pulmonary:      Effort: Pulmonary effort is normal. No respiratory distress. Breath sounds: Normal breath sounds. No wheezing or rales. Abdominal:      General: Bowel sounds are normal. There is no distension. Palpations: Abdomen is soft. Tenderness: There is no abdominal tenderness. There is no right CVA tenderness, left CVA tenderness, guarding or rebound. Musculoskeletal:         General: No tenderness. Normal range of motion. Cervical back: Normal range of motion and neck supple. No rigidity or tenderness. Right lower leg: No edema. Left lower leg: No edema. Comments:  There is no midline or paraspinal tenderness to patient's lower back; there are no step-offs or deformities. No overlying skin changes. Patient's range of motion of her lower extremities is normal but her lower back hurts more when she flexes her left hip. Skin:     General: Skin is warm and dry. Capillary Refill: Capillary refill takes less than 2 seconds. Coloration: Skin is not jaundiced or pale. Findings: No bruising, erythema or rash. Neurological:      General: No focal deficit present. Mental Status: She is alert and oriented to person, place, and time. Sensory: No sensory deficit. Motor: No weakness. Psychiatric:         Mood and Affect: Mood normal.         Behavior: Behavior normal.        Procedures       [facility]       ED Course as of 11/22/22 1521   Tue Nov 22, 2022   1516 ED attending spoke with Dr. Zakia Antony, discussed case, patient is accepted for admission.  [VG]      ED Course User Index  [VG] Mau Chen DO         Medical Decision Making: This is a 67 y.o. female presenting for evaluation of low back pain worsening over the past several weeks with findings of compression fractures of L1 and L2, MRI findings outpatient yesterday showed subacute fractures that have progressed since a recent CT. She presents from her PCP office for worsening pain and this is now her third ED visit for the symptoms. PCP called ahead, patient will need neurosurgery evaluation. On my evaluation today, patient is overall well-appearing; she has no neurologic findings on my examination, normal sensation throughout, normal range of motion of all extremities and there is no tenderness to direct palpation of her lower lumbar spine; she has no step-offs or deformities noted. She does have pain worsened when she sits up in the bed and if she flexes her legs especially her left. Distal pulses are 2+ throughout.   Her abdomen is soft and nontender; she did state that she started taking docusate 2 days ago which caused her to have a bowel movement yesterday and she did get some relief. Her vital signs are stable. Currently she declines any need for pain medication and states she feels better when she is at rest.  Decision made to admit this patient for further evaluation and management of subacute lumbar spine fractures with intractable pain. Consult placed to neurosurgery. Medicine consulted for admission, patient is accepted. See ED COURSE for additional MDM. Labs & imaging were reviewed and interpreted, see RESULTS. I have personally reviewed all laboratory and imaging results for this patient. I have discussed this patient with my attending, who has seen the patient and agrees with this disposition. Patient was seen and evaluated by myself and my attending Yobany Awan MD. Assessment and Plan discussed with attending provider, please see attestation for final plan of care.           --------------------------------------------- PAST HISTORY ---------------------------------------------  Past Medical History:  has a past medical history of Bruising tendency and Hyperlipidemia. Past Surgical History:  has no past surgical history on file. Social History:  reports that she has never smoked. She has never used smokeless tobacco. She reports that she does not drink alcohol and does not use drugs. Family History: family history includes Arthritis in her mother; Heart Disease in her father; Lupus in her mother; Stroke in her father. Unless otherwise noted, family history is non contributory. The patients home medications have been reviewed. Allergies: Latex    -------------------------------------------------- RESULTS -------------------------------------------------    LABS:  No results found for this visit on 11/22/22.     RADIOLOGY:  No orders to display         Interpreted by the radiologist unless otherwise specified.      ------------------------- NURSING NOTES AND VITALS REVIEWED ---------------------------  Date / Time Roomed:  11/22/2022  1:06 PM  ED Bed Assignment:  33/33    The nursing notes within the ED encounter and vital signs as below have been reviewed by myself. Patient Vitals for the past 24 hrs:   BP Temp Pulse Resp SpO2   11/22/22 1306 138/76 -- 81 18 97 %   11/22/22 1253 -- 97.6 °F (36.4 °C) (!) 102 -- 97 %       Oxygen Saturation Interpretation: Normal    The patients available past medical records and past encounters were reviewed. ------------------------------------------ PROGRESS NOTES ------------------------------------------  Re-evaluation(s):  Please see ED course    Counseling:  I have spoken with the patient and her   and discussed todays results, in addition to providing specific details for the plan of care and counseling regarding the diagnosis and prognosis. Their questions are answered at this time and they are agreeable with the plan of admission.    --------------------------------- ADDITIONAL PROVIDER NOTES ---------------------------------  Consultations:  Please see ED course    This patient's ED course included: a personal history and physicial examination, re-evaluation prior to disposition, multiple bedside re-evaluations, IV medications, cardiac monitoring, continuous pulse oximetry, and complex medical decision making and emergency management    This patient has remained hemodynamically stable during their ED course. Diagnosis:  1. Lumbar compression fracture, closed, [facility]        Disposition:  Patient's disposition: Admit to med/surg floor  Patient's condition is stable. Mary Krishna D.O. PGY-3     Resident Physician     Emergency Medicine      11/22/2022 2:10 PM      NOTE: This report was transcribed using voice recognition software.  Every effort was made to ensure accuracy; however, inadvertent computerized transcription errors may be present      ' Quiana Echols, DO  Resident  11/22/22 4070

## 2022-11-22 NOTE — ED PROVIDER NOTES
ATTENDING PROVIDER ATTESTATION:     Uziel Hidalgo presented to the emergency department for evaluation of Fracture (Compression fractures lower back )   and was initially evaluated by the Medical Resident. See Original ED Note for H&P and ED course above. I have reviewed and discussed the case, including pertinent history (medical, surgical, family and social) and exam findings with the Medical Resident assigned to Uziel Hidalgo. I have personally performed and/or participated in the history, exam, medical decision making, and procedures and agree with all pertinent clinical information and any additional changes or corrections are noted below in my assessment and plan. I have discussed this patient in detail with the resident, and provided the instruction and education,       I have reviewed my findings and recommendations with the assigned Medical Resident, Uziel Hidalgo and members of family present at the time of disposition. I have performed a history and physical examination of this patient and directly supervised the resident caring for this patient      History of Present Illness:    Presents to the ED for spine fracture, beginning prior to arrival.  The complaint has been constant, severe in severity, and worsened by movement. Sent by PCP for admission and neurosurgery consult for spinal fractures. Had outpatient MRI showing spinal fracture. Here for further evaluation. She c/o pain with movement. No bowel or bladder incontinence or saddle anesthesia. No motor weakness. On fever or chills. No chest pain or sob. Denies other complaints.          Review of Systems:   A complete review of systems was performed and pertinent positives and negatives are stated within HPI, all other systems reviewed and are negative.    --------------------------------------------- PAST HISTORY ---------------------------------------------  Past Medical History:  has a past medical history of Bruising tendency and Hyperlipidemia. Past Surgical History: denies    Social History:  reports that she has never smoked. She has never used smokeless tobacco. She reports that she does not drink alcohol and does not use drugs. Family History: family history includes Arthritis in her mother; Heart Disease in her father; Lupus in her mother; Stroke in her father. Unless otherwise noted, family history is non contributory    The patients home medications have been reviewed. Allergies: Latex       Physical Exam:  Constitutional/General: Alert and oriented x3  Head: Normocephalic and atraumatic  Eyes: PERRL, EOMI, sclera non icteric  ENT: Oropharynx clear, handling secretions  Neck: , full ROM,   no meningeal signs  Respiratory: Lungs clear to auscultation bilaterally Not in respiratory distress  Cardiovascular:  Regular rate. Regular rhythm. No murmurs, no gallops, no rubs. 2+ distal pulses. Equal extremity pulses. GI:  Abdomen Soft, Non tender,  Musculoskeletal: Moves all extremities x 4. Warm and well perfused,  Integument: skin warm and dry. No rashes.    Neurologic: GCS 15, no focal deficits  Psychiatric: Normal Affect      I directly supervised any procedures performed by the resident and was present for the procedure including all critical portions of the procedure             I, Dr. Franklyn Croft, am the primary provider of record      Medical Decision Making:   Worsening back pain, no red flag sx or neuro sx, sent by PCP for admission and neurosurgery consultation          Name and Route of medications administered in the ED:  Medications   amLODIPine (NORVASC) tablet 5 mg (5 mg Oral Given 11/22/22 5098)   sodium chloride flush 0.9 % injection 5-40 mL (has no administration in time range)   sodium chloride flush 0.9 % injection 5-40 mL (has no administration in time range)   0.9 % sodium chloride infusion (has no administration in time range)   enoxaparin (LOVENOX) injection 40 mg (40 mg SubCUTAneous Given 11/22/22 1739)   ondansetron (ZOFRAN-ODT) disintegrating tablet 4 mg (has no administration in time range)     Or   ondansetron (ZOFRAN) injection 4 mg (has no administration in time range)   polyethylene glycol (GLYCOLAX) packet 17 g (has no administration in time range)   acetaminophen (TYLENOL) tablet 650 mg (has no administration in time range)     Or   acetaminophen (TYLENOL) suppository 650 mg (has no administration in time range)   HYDROcodone-acetaminophen (NORCO) 5-325 MG per tablet 1 tablet (1 tablet Oral Given 11/22/22 1746)   morphine (PF) injection 2 mg (has no administration in time range)            Re-Evaluations:  ED Course as of 11/22/22 1910 Tue Nov 22, 2022   1516 ED attending spoke with Dr. Eron Ervin, discussed case, patient is accepted for admission.  [VG]      ED Course User Index  [VG] Loli Leo DO           Consultations:  Dr. Ingris Flanagan to Neurosurgery as well    Critical Care: none    1.  Lumbar compression fracture, closed, initial encounter [facility])            Greg Norman MD  11/22/22 1911

## 2022-11-23 PROCEDURE — 97165 OT EVAL LOW COMPLEX 30 MIN: CPT

## 2022-11-23 PROCEDURE — 6370000000 HC RX 637 (ALT 250 FOR IP): Performed by: INTERNAL MEDICINE

## 2022-11-23 PROCEDURE — G0378 HOSPITAL OBSERVATION PER HR: HCPCS

## 2022-11-23 PROCEDURE — 96372 THER/PROPH/DIAG INJ SC/IM: CPT

## 2022-11-23 PROCEDURE — 96374 THER/PROPH/DIAG INJ IV PUSH: CPT

## 2022-11-23 PROCEDURE — 6360000002 HC RX W HCPCS: Performed by: INTERNAL MEDICINE

## 2022-11-23 PROCEDURE — 2580000003 HC RX 258: Performed by: INTERNAL MEDICINE

## 2022-11-23 RX ORDER — DOCUSATE SODIUM 100 MG/1
100 CAPSULE, LIQUID FILLED ORAL 2 TIMES DAILY
Status: DISCONTINUED | OUTPATIENT
Start: 2022-11-23 | End: 2022-11-24 | Stop reason: HOSPADM

## 2022-11-23 RX ADMIN — SODIUM CHLORIDE, PRESERVATIVE FREE 10 ML: 5 INJECTION INTRAVENOUS at 08:54

## 2022-11-23 RX ADMIN — HYDROCODONE BITARTRATE AND ACETAMINOPHEN 1 TABLET: 5; 325 TABLET ORAL at 15:33

## 2022-11-23 RX ADMIN — ENOXAPARIN SODIUM 40 MG: 100 INJECTION SUBCUTANEOUS at 08:54

## 2022-11-23 RX ADMIN — DOCUSATE SODIUM 100 MG: 100 CAPSULE, LIQUID FILLED ORAL at 20:08

## 2022-11-23 RX ADMIN — BISACODYL 10 MG: 5 TABLET, COATED ORAL at 09:29

## 2022-11-23 RX ADMIN — MORPHINE SULFATE 2 MG: 2 INJECTION, SOLUTION INTRAMUSCULAR; INTRAVENOUS at 01:34

## 2022-11-23 RX ADMIN — SODIUM CHLORIDE, PRESERVATIVE FREE 5 ML: 5 INJECTION INTRAVENOUS at 20:08

## 2022-11-23 RX ADMIN — DOCUSATE SODIUM 100 MG: 100 CAPSULE, LIQUID FILLED ORAL at 09:29

## 2022-11-23 RX ADMIN — AMLODIPINE BESYLATE 5 MG: 5 TABLET ORAL at 08:54

## 2022-11-23 RX ADMIN — HYDROCODONE BITARTRATE AND ACETAMINOPHEN 1 TABLET: 5; 325 TABLET ORAL at 22:04

## 2022-11-23 RX ADMIN — HYDROCODONE BITARTRATE AND ACETAMINOPHEN 1 TABLET: 5; 325 TABLET ORAL at 09:34

## 2022-11-23 ASSESSMENT — PAIN DESCRIPTION - ORIENTATION
ORIENTATION: LOWER
ORIENTATION: LOWER

## 2022-11-23 ASSESSMENT — PAIN DESCRIPTION - LOCATION
LOCATION: BACK

## 2022-11-23 ASSESSMENT — PAIN DESCRIPTION - PAIN TYPE
TYPE: ACUTE PAIN
TYPE: ACUTE PAIN

## 2022-11-23 ASSESSMENT — PAIN DESCRIPTION - DESCRIPTORS
DESCRIPTORS: ACHING;BURNING;THROBBING
DESCRIPTORS: ACHING;DISCOMFORT;SORE
DESCRIPTORS: ACHING;DISCOMFORT;SORE

## 2022-11-23 ASSESSMENT — PAIN SCALES - GENERAL
PAINLEVEL_OUTOF10: 7
PAINLEVEL_OUTOF10: 3
PAINLEVEL_OUTOF10: 3
PAINLEVEL_OUTOF10: 9
PAINLEVEL_OUTOF10: 6
PAINLEVEL_OUTOF10: 4

## 2022-11-23 ASSESSMENT — ENCOUNTER SYMPTOMS
CONSTIPATION: 1
TROUBLE SWALLOWING: 0
SHORTNESS OF BREATH: 0
ABDOMINAL PAIN: 0
BACK PAIN: 1
PHOTOPHOBIA: 0

## 2022-11-23 ASSESSMENT — PAIN - FUNCTIONAL ASSESSMENT: PAIN_FUNCTIONAL_ASSESSMENT: PREVENTS OR INTERFERES SOME ACTIVE ACTIVITIES AND ADLS

## 2022-11-23 ASSESSMENT — PAIN DESCRIPTION - ONSET: ONSET: ON-GOING

## 2022-11-23 ASSESSMENT — PAIN DESCRIPTION - FREQUENCY: FREQUENCY: CONTINUOUS

## 2022-11-23 NOTE — CARE COORDINATION
11/23/22 Transition of Care: Patient is observation due to c/o back pain. She was found to have a Lumbar fracture. She is alert and oriented. Her  is at the bedside. She resides with him in a one story home. Bed/bath are on the main floor. She has a cane and walker at home. She has a history of outpatient therapy. She follows with Dr Prince Chambers and her [facility] in [place]. She is taking the oral narcotic and states her pain level is 8 out of 10 currently. She is pending a TLSO brace today and will then be evaluated by therapies. She is stating she wishes to return home at discharge. Will await therapy evaluations for further plan of care.  Electronically signed by Belen Gandara RN CM on 11/23/2022 at 11:05 AM

## 2022-11-23 NOTE — H&P
[address]                  [address] [facility],  [facility]                              HISTORY AND PHYSICAL    PATIENT NAME: Destiny Mondragon                  :        1950  MED REC NO:   [id number]                            ROOM:       5208  ACCOUNT NO:   [de-identified]                           ADMIT DATE: 2022  PROVIDER:     Yony Paulino DO    CHIEF COMPLAINT:  Back pain. HISTORY OF PRESENT ILLNESS:  The patient is a 79-year-old   female who presented to the emergency room with complaint of low back  pain. She was seen by her primary care physician. Had an MRI of the  lumbar spine as an outpatient which revealed compression fractures. She  was sent to the emergency room. She was admitted for further evaluation  and treatment. MEDICATIONS PRIOR TO ADMISSION:  Vitamin D, Vanquish over-the-counter  p.r.n., amlodipine. PRIMARY CARE PHYSICIAN:  Rossy Urban MD    PAST MEDICAL HISTORY:  Hypertension, hyperlipidemia. PAST SURGICAL HISTORY:  [place] teeth extraction. SOCIAL HISTORY:  No tobacco, no alcohol. REVIEW OF SYSTEMS:  Remarkable for above-stated chief complaint and also  patient with complaints of constipation. ALLERGIES:  LATEX. PHYSICAL EXAMINATION:  GENERAL APPEARANCE:  Reveals a 79-year-old  female who is alert  and oriented x3, cooperative and a good historian. VITAL SIGNS:  On admission, temperature 97.6, pulse 102, respirations  18, blood pressure 138/76. HEENT:  Head:  Normocephalic, atraumatic. Eyes:  Pupils equal and  reactive to light. Extraocular muscles intact. Fundi not well  visualized. Nose:  No obstruction, polyp or discharge noted. Mouth  mucosa without lesion. Pharynx noninjected without exudate. NECK:  Supple. No JVD. No thyromegaly. No carotid bruits. HEART:  Regular rate and rhythm without murmur. LUNGS:  Clear to auscultation bilaterally.   ABDOMEN:  Positive bowel sounds, soft, nontender. No rebound or  guarding. No hepatosplenomegaly. No masses. BACK:  There is moderate spasm and tenderness to palpation in the  bilateral lumbar paraspinal muscle region. EXTREMITIES:  Without edema. LYMPH NODES:  No lymphadenopathy noted. Skin:  Without rash or lesion. IMPRESSION:  Intractable low back pain, lumbar compression fractures,  hypertension, constipation. PLAN:  Admit. Pain control. Neurosurgery to see. PT, OT eval.  Social  services for discharge planning. DISCHARGE PLAN:  Home when stable.         Elena Huber DO    D: 11/23/2022 9:14:21       T: 11/23/2022 9:16:34     [id number]  Job#: [id number]     Doc#: [id number]    CC:

## 2022-11-23 NOTE — CONSULTS
NEUROSURGERY INPATIENT CONSULT NOTE    Chief Complaint: low back pain     HPI:   Issa Gustafson is a 67 y.o.  female who presents to the hospital c/o low back pain. Patient states about 4 weeks ago she was pushing a heavy cart and immediately felt severe low back pain. The pain is sharp, stabbing, and non radiating. Any type of movement makes the pain worse. Pain has continued to worsen. MRI lumbar spine demonstrates subacute fractures at L1 and L2 for which neurosurgery was consulted. Denies loss of bowel or bladder, saddle anesthesia, pain down the legs, numbness, tingling, headache, loss of dexterity, abnormal gait, fever, chills, N/V, SOB, or chest pain. Past Medical History:   Diagnosis Date    Bruising tendency     Hyperlipidemia      History reviewed. No pertinent surgical history.    Family History   Problem Relation Age of Onset    Arthritis Mother     Lupus Mother     Stroke Father     Heart Disease Father         ? heart attack not sure         Medications:   Current Facility-Administered Medications   Medication Dose Route Frequency Provider Last Rate Last Admin    amLODIPine (NORVASC) tablet 5 mg  5 mg Oral Daily Alexus Mata, DO   5 mg at 11/22/22 1739    sodium chloride flush 0.9 % injection 5-40 mL  5-40 mL IntraVENous 2 times per day General Slicker, DO        sodium chloride flush 0.9 % injection 5-40 mL  5-40 mL IntraVENous PRN General Slicker, DO        0.9 % sodium chloride infusion   IntraVENous PRN Alexus Mata DO        enoxaparin (LOVENOX) injection 40 mg  40 mg SubCUTAneous Daily Alexus Mata, DO   40 mg at 11/22/22 1739    ondansetron (ZOFRAN-ODT) disintegrating tablet 4 mg  4 mg Oral Q8H PRN Alexus Mata DO        Or    ondansetron [facility]) injection 4 mg  4 mg IntraVENous Q6H PRN Alexus Mata, DO        polyethylene glycol (GLYCOLAX) packet 17 g  17 g Oral Daily PRN Alexus Mata DO        acetaminophen (TYLENOL) tablet 650 mg  650 mg Oral Q6H PRN Amaryllis Kylie, DO        Or    acetaminophen (TYLENOL) suppository 650 mg  650 mg Rectal Q6H PRN Poonam Harvey Malmer, DO        HYDROcodone-acetaminophen [facility]) 5-325 MG per tablet 1 tablet  1 tablet Oral Q6H PRN Poonam Beards Malmer, DO   1 tablet at 11/22/22 1746    morphine (PF) injection 2 mg  2 mg IntraVENous Q4H PRN Poonam Beards Malmer, DO   2 mg at 11/23/22 0134        Allergies:    Latex     /71   Pulse (!) 112   Temp 97.2 °F (36.2 °C) (Temporal)   Resp 18   SpO2 95%     Review of Systems   Constitutional:  Negative for fever and unexpected weight change. HENT:  Negative for trouble swallowing. Eyes:  Negative for photophobia and visual disturbance. Respiratory:  Negative for shortness of breath. Cardiovascular:  Negative for chest pain. Gastrointestinal:  Positive for constipation. Negative for abdominal pain. Endocrine: Negative for heat intolerance. Genitourinary:  Negative for flank pain. Musculoskeletal:  Positive for arthralgias and back pain. Negative for gait problem, myalgias and neck pain. Skin:  Negative for wound. Neurological:  Negative for weakness, numbness and headaches. Psychiatric/Behavioral:  Negative for confusion. Physical Exam  Constitutional:       Appearance: Normal appearance. She is well-developed. HENT:      Head: Normocephalic and atraumatic. Eyes:      Extraocular Movements: Extraocular movements intact. Conjunctiva/sclera: Conjunctivae normal.      Pupils: Pupils are equal, round, and reactive to light. Cardiovascular:      Rate and Rhythm: Normal rate. Pulmonary:      Effort: Pulmonary effort is normal.   Abdominal:      General: There is no distension. Musculoskeletal:      Cervical back: Normal range of motion and neck supple. Comments: Tenderness to palpation midline lumbar spine   Skin:     General: Skin is warm and dry. Neurological:      Mental Status: She is alert.       Comments: Alert and oriented x3  CN3-12 intact  Motor strength full, pain in back with BLE  Sensation intact to light touch     Psychiatric:         Thought Content: Thought content normal.          Assessment:   Subacute L1 and L2 compression fractures    Plan:  -No surgical intervention. Will treat fractures in soft TLSO brace. Ordered  -Wear TLSO when greater than 45 degrees  -PT/OT in TLSO  -Pain control  -Follow up in Neurosurgery clinic in 4 weeks with lumbar XR      Electronically signed by Cristy Martines PA-C on 11/23/2022 at 8:49 AM     I have interviewed and examined the patient and agree with above. I have spent over 30 mins on medical decision making an din discussing her condition with her.  Will manage fracture in TLSO brace    Amandeep Causey MD

## 2022-11-23 NOTE — PROGRESS NOTES
OT consult received and appreciated. Chart reviewed. Will hold evaluation due to TLSO brace ordered . Will evaluate at a later time. Thank you. Jose M Marie.  Sienna 71, [place] 69

## 2022-11-23 NOTE — PLAN OF CARE
Problem: Discharge Planning  Goal: Discharge to home or other facility with appropriate resources  Outcome: Progressing     Problem: Safety - Adult  Goal: Free from fall injury  Outcome: Progressing     Problem: ABCDS Injury Assessment  Goal: Absence of physical injury  Outcome: Progressing  Flowsheets (Taken [id number] by Trinity Rios RN)  Absence of Physical Injury: Implement safety measures based on patient assessment

## 2022-11-23 NOTE — PROGRESS NOTES
[address]        OKJD:                                                  Patient Name: Pankaj Billings    MRN: [id number]    : 1950    Room: [address] [place] [address]          Evaluating OT: Maribell Miller OTR/L; [id number]       Referring Provider: Cat Dominique DO    Specific Provider Orders/Date: OT Eval and Treat 22       Diagnosis: Pt c/o low back pain ~3 weeks PTA. Imaging ID'd subacute fx's of L1 & L2 & old compression fx's T12 & L3.    Surgery: None this admission     Pertinent Medical History:  has a past medical history of Bruising tendency and Hyperlipidemia.      Recommended Adaptive Equipment: TBD     Precautions:  Fall Risk, TLSO, spinal precautions     Assessment of current deficits    [x] Functional mobility  [x]ADLs  [x] Strength               []Cognition    [x] Functional transfers   [x] IADLs         [x] Safety Awareness   [x]Endurance    [] Fine Coordination              [x] Balance      [] Vision/perception   []Sensation     []Gross Motor Coordination  [] ROM  [] Delirium                   [] Motor Control     OT PLAN OF CARE   OT POC based on physician orders, patient diagnosis and results of clinical assessment    Frequency/Duration 3-5 days/wk for 2 weeks PRN   Specific OT Treatment Interventions to include:   * Instruction/training on adapted ADL techniques and AE recommendations to increase functional independence within precautions       * Training on energy conservation strategies, correct breathing pattern and techniques to improve independence/tolerance for self-care routine  * Functional transfer/mobility training/DME recommendations for increased independence, safety, and fall prevention  * Patient/Family education to increase follow through with safety techniques and functional independence  * Recommendation of environmental modifications for increased safety with functional transfers/mobility and ADLs  * Therapeutic exercise to improve motor endurance, ROM, and functional strength for ADLs/functional transfers  * Therapeutic activities to facilitate/challenge dynamic balance, stand tolerance for increased safety and independence with ADLs  * Positioning to improve skin integrity, interaction with environment and functional independence    Home Living: Pt lives with  in 1 story home with 4 steps & 1 handrail to enter. Bathroom setup: [address] owned: [facility], standard walker    Prior Level of Function: IND with ADLs , IND with IADLs; engaged in functional mobility with use of  SPC  Driving: Yes  Occupation: None reported    Pain Level: Pt c/o mild back pain; RN made aware & in room at start of session to provide pain medication. Cognition: A&O: 4/4; Follows 2 step directions   Memory:  Good   Sequencing:  Fair+   Problem solving:  Fair+   Judgement/safety:  Fair     Functional Assessment:  AM-PAC Daily Activity Raw Score: 19/24   Initial Eval Status  Date: 11/23/22 Treatment Status  Date: STGs = LTGs  Time frame: 10-14 days   Feeding Independent       Grooming Stand by Assist   Pt able to brush hair seated in bedside chair. Modified [place]    UB Dressing Minimal Assist   Assist to tie gown seated EOB. Modified [place]    LB Dressing Minimal Assist   Pt able to don/doff socks seated in bedside chair utilizing figure-4 technique. Modified [place]    Bathing Minimal Assist  Simulated seated unsupported. Modified [place]    Toileting Minimal Assist   Modified [place]    Bed Mobility  Log Roll: SBA  Supine to sit: Stand by Assist   Sit to supine: NT   Supine to sit:  Independent   Sit to supine: Independent    Functional Transfers Sit to stand:SBA   Stand to sit:SBA  Stand pivot: SBA  Commode: NT  Sit to stand:Modified [place]   Stand to sit:Modified [place]  Stand pivot: Modified [place]  Commode: Modified [place]    Functional Mobility SBA  Use of SPC shorter than household distance  Modified [place] with use of Appropriate AD   Balance Sitting: Supervision  Standing: SBA  Sitting: Independent  Standing: Modified [place]   Activity Tolerance Fair  Good   Visual/  Perceptual Glasses: Yes - readers  Appears WFL      Safety Fair  Good  during ADL completion following spinal precautions     Hand Dominance Right   AROM (PROM) Strength Additional Info:    RUE  WFL 4-/5 grossly tested good  and wfl FMC/dexterity noted during ADL tasks     LUE WFL 4-/5 grossly tested Good  and wfl FMC/dexterity noted during ADL tasks       Hearing: WFL  Sensation:  No c/o numbness or tingling BUE  Tone: WFL BUE  Edema: Unremarkable    Comment: Cleared by RN to see pt. Upon arrival patient supine in bed with TLSO donned and agreeable to OT session with  in room. At end of session, patient seated in bedside chair with call light and phone within reach, all lines and tubes intact. Overall patient demonstrated decreased independence and safety during completion of ADL/functional transfer/mobility tasks. Therapist facilitated ADL tasks, functional transfers, functional mobility, bed mobility to address safety awareness, implementation of fall prevention strategies, & engagement throughout functional tasks. Pt & family educated on spinal precautions & activity modifications/adaptations throughout session to promote skin/joint integrity & proper body mechanics throughout daily activities. Pt required min verbal cues with light assist to implement education & maintain spinal neutrality throughout functional tasks. Pt would benefit from continued skilled OT to increase safety and independence with completion of ADL/IADL tasks for functional independence and quality of life.     Rehab Potential: Good for established goals     LTG: maximize independence with ADLs to return to PLOF    Patient and/or family were instructed on functional diagnosis, prognosis/goals and OT plan of care. Demonstrated fair understanding. Eval Complexity: Low  History: Expanded chart review of medical records and additional review of physical, cognitive, or psychosocial history related to current functional performance  Exam: 3+ performance deficits  Assistance/Modification: Min/mod assistance or modifications required to perform tasks. May have comorbidities that affect occupational performance. Evaluation time includes thorough review of current medical information, gathering information on past medical & social history & PLOF, completion of standardized testing, informal observation of tasks, consultation with other medical professions/disciplines, assessment of data & development of POC/goals. Time In: 3:30p  Time Out: 3:50p  Total Treatment Time: 0 minutes    Min Units   OT Eval Low 01784  x     OT Eval Medium 17907      OT Eval High 79777      OT Re-Eval [id number]       Therapeutic Ex 13914       Therapeutic Activities 57151       ADL/Self Care 38649       Orthotic Management 21167       Manual [id number]     Neuro Re-Ed 49365       Non-Billable Time          Evaluation Time additionally includes thorough review of current medical information, gathering information on past medical history/social history and prior level of function, interpretation of standardized testing/informal observation of tasks, assessment of data and development of plan of care and goals.               Don Newman OTR/L; [id number]

## 2022-11-24 VITALS
RESPIRATION RATE: 16 BRPM | TEMPERATURE: 97.5 F | HEART RATE: 92 BPM | DIASTOLIC BLOOD PRESSURE: 78 MMHG | OXYGEN SATURATION: 97 % | SYSTOLIC BLOOD PRESSURE: 110 MMHG

## 2022-11-24 PROCEDURE — 6360000002 HC RX W HCPCS: Performed by: INTERNAL MEDICINE

## 2022-11-24 PROCEDURE — 99214 OFFICE O/P EST MOD 30 MIN: CPT | Performed by: NEUROLOGICAL SURGERY

## 2022-11-24 PROCEDURE — 6370000000 HC RX 637 (ALT 250 FOR IP): Performed by: INTERNAL MEDICINE

## 2022-11-24 PROCEDURE — G0378 HOSPITAL OBSERVATION PER HR: HCPCS

## 2022-11-24 PROCEDURE — 97161 PT EVAL LOW COMPLEX 20 MIN: CPT

## 2022-11-24 PROCEDURE — 2580000003 HC RX 258: Performed by: INTERNAL MEDICINE

## 2022-11-24 PROCEDURE — L0464 TLSO 4MOD SACRO-SCAP PRE: HCPCS

## 2022-11-24 PROCEDURE — 96372 THER/PROPH/DIAG INJ SC/IM: CPT

## 2022-11-24 RX ORDER — SODIUM PHOSPHATE, DIBASIC AND SODIUM PHOSPHATE, MONOBASIC 7; 19 G/133ML; G/133ML
1 ENEMA RECTAL
Status: COMPLETED | OUTPATIENT
Start: 2022-11-24 | End: 2022-11-24

## 2022-11-24 RX ORDER — BISACODYL 10 MG
10 SUPPOSITORY, RECTAL RECTAL DAILY PRN
Status: DISCONTINUED | OUTPATIENT
Start: 2022-11-24 | End: 2022-11-24 | Stop reason: HOSPADM

## 2022-11-24 RX ORDER — HYDROCODONE BITARTRATE AND ACETAMINOPHEN 5; 325 MG/1; MG/1
1 TABLET ORAL EVERY 6 HOURS PRN
Qty: 20 TABLET | Refills: 0 | Status: SHIPPED | OUTPATIENT
Start: 2022-11-24 | End: 2022-11-29

## 2022-11-24 RX ORDER — LACTULOSE 10 G/15ML
20 SOLUTION ORAL ONCE
Status: COMPLETED | OUTPATIENT
Start: 2022-11-24 | End: 2022-11-24

## 2022-11-24 RX ORDER — PSEUDOEPHEDRINE HCL 30 MG
100 TABLET ORAL 2 TIMES DAILY
Qty: 60 CAPSULE | Refills: 0 | COMMUNITY
Start: 2022-11-24

## 2022-11-24 RX ADMIN — SODIUM PHOSPHATE, DIBASIC AND SODIUM PHOSPHATE, MONOBASIC 1 ENEMA: 7; 19 ENEMA RECTAL at 13:50

## 2022-11-24 RX ADMIN — AMLODIPINE BESYLATE 5 MG: 5 TABLET ORAL at 09:30

## 2022-11-24 RX ADMIN — ONDANSETRON 4 MG: 4 TABLET, ORALLY DISINTEGRATING ORAL at 11:14

## 2022-11-24 RX ADMIN — POLYETHYLENE GLYCOL 3350 17 G: 17 POWDER, FOR SOLUTION ORAL at 09:30

## 2022-11-24 RX ADMIN — ENOXAPARIN SODIUM 40 MG: 100 INJECTION SUBCUTANEOUS at 09:31

## 2022-11-24 RX ADMIN — BISACODYL 10 MG: 10 SUPPOSITORY RECTAL at 15:02

## 2022-11-24 RX ADMIN — DOCUSATE SODIUM 100 MG: 100 CAPSULE, LIQUID FILLED ORAL at 09:30

## 2022-11-24 RX ADMIN — HYDROCODONE BITARTRATE AND ACETAMINOPHEN 1 TABLET: 5; 325 TABLET ORAL at 14:14

## 2022-11-24 RX ADMIN — HYDROCODONE BITARTRATE AND ACETAMINOPHEN 1 TABLET: 5; 325 TABLET ORAL at 05:16

## 2022-11-24 RX ADMIN — SODIUM CHLORIDE, PRESERVATIVE FREE 10 ML: 5 INJECTION INTRAVENOUS at 09:31

## 2022-11-24 RX ADMIN — LACTULOSE 20 G: 20 SOLUTION ORAL at 09:30

## 2022-11-24 ASSESSMENT — PAIN DESCRIPTION - DESCRIPTORS: DESCRIPTORS: ACHING;DISCOMFORT;SORE

## 2022-11-24 ASSESSMENT — PAIN SCALES - GENERAL
PAINLEVEL_OUTOF10: 3
PAINLEVEL_OUTOF10: 6

## 2022-11-24 ASSESSMENT — PAIN DESCRIPTION - PAIN TYPE: TYPE: ACUTE PAIN

## 2022-11-24 ASSESSMENT — PAIN DESCRIPTION - LOCATION: LOCATION: BACK

## 2022-11-24 NOTE — PROGRESS NOTES
[facility] Medicine    Subjective:  pt alert conversive c/o constipation / c/o pain with movement      Current Facility-Administered Medications:     sodium phosphate (FLEET) rectal enema 1 enema, 1 enema, Rectal, Once PRN, Cruzito Mata, DO    docusate sodium (COLACE) capsule 100 mg, 100 mg, Oral, BID, Cruzito Mata, DO, 100 mg at 11/24/22 0930    amLODIPine (NORVASC) tablet 5 mg, 5 mg, Oral, Daily, Cruzito Mata, DO, 5 mg at 11/24/22 0930    sodium chloride flush 0.9 % injection 5-40 mL, 5-40 mL, IntraVENous, 2 times per day, Deb Noon, DO, 10 mL at 11/24/22 0931    sodium chloride flush 0.9 % injection 5-40 mL, 5-40 mL, IntraVENous, PRN, Cruzito Mata, DO    0.9 % sodium chloride infusion, , IntraVENous, PRN, Cruzito Mata, DO    enoxaparin (LOVENOX) injection 40 mg, 40 mg, SubCUTAneous, Daily, Tarkika Garay Malarely, DO, 40 mg at 11/24/22 0931    ondansetron (ZOFRAN-ODT) disintegrating tablet 4 mg, 4 mg, Oral, Q8H PRN **OR** ondansetron (ZOFRAN) injection 4 mg, 4 mg, IntraVENous, Q6H PRN, Cruzito Garay Malmer, DO    polyethylene glycol (GLYCOLAX) packet 17 g, 17 g, Oral, Daily PRN, Cruzito Mata, DO, 17 g at 11/24/22 0930    acetaminophen (TYLENOL) tablet 650 mg, 650 mg, Oral, Q6H PRN **OR** acetaminophen (TYLENOL) suppository 650 mg, 650 mg, Rectal, Q6H PRN, Cruzito Hannamer, DO    HYDROcodone-acetaminophen (NORCO) 5-325 MG per tablet 1 tablet, 1 tablet, Oral, Q6H PRN, Cruzito Mata, DO, 1 tablet at 11/24/22 0516    morphine (PF) injection 2 mg, 2 mg, IntraVENous, Q4H PRN, Cruzito Hannamer, DO, 2 mg at 11/23/22 0134    Objective:    /60   Pulse 82   Temp 97.5 °F (36.4 °C) (Temporal)   Resp 18   SpO2 96%     Heart:  reg  Lungs:  ctab  Abd: + bs soft nontender  Extrem:  w/o edema    CBC with Differential:    Lab Results   Component Value Date/Time    WBC 13.7 11/22/2022 04:06 PM    RBC 5.41 11/22/2022 04:06 PM    HGB 16.4 11/22/2022 04:06 PM    HCT 49.4 11/22/2022 04:06 PM     11/22/2022 04:06 PM    MCV 91.3 11/22/2022 04:06 PM    MCH 30.3 11/22/2022 04:06 PM    MCHC 33.2 11/22/2022 04:06 PM    RDW 14.0 11/22/2022 04:06 PM    LYMPHOPCT 6.9 11/22/2022 04:06 PM    MONOPCT 6.0 11/22/2022 04:06 PM    BASOPCT 0.4 11/22/2022 04:06 PM    MONOSABS 0.82 11/22/2022 04:06 PM    LYMPHSABS 0.94 11/22/2022 04:06 PM    EOSABS 0.12 11/22/2022 04:06 PM    BASOSABS 0.05 11/22/2022 04:06 PM     CMP:    Lab Results   Component Value Date/Time     11/22/2022 04:06 PM    K 4.2 11/22/2022 04:06 PM    K 3.1 11/17/2022 11:37 AM    CL 94 11/22/2022 04:06 PM    CO2 21 11/22/2022 04:06 PM    BUN 6 11/22/2022 04:06 PM    CREATININE 0.6 11/22/2022 04:06 PM    GFRAA >60 11/14/2015 04:10 AM    LABGLOM >60 11/22/2022 04:06 PM    GLUCOSE 87 11/22/2022 04:06 PM    PROT 8.2 11/22/2022 04:06 PM    LABALBU 4.4 11/22/2022 04:06 PM    CALCIUM 10.9 11/22/2022 04:06 PM    BILITOT 0.4 11/22/2022 04:06 PM    ALKPHOS 100 11/22/2022 04:06 PM    AST 19 11/22/2022 04:06 PM    ALT 10 11/22/2022 04:06 PM     Warfarin PT/INR:    Lab Results   Component Value Date    INR 1.0 11/14/2015    PROTIME 10.9 11/14/2015       Assessment:    Principal Problem:    Lumbar compression fracture, closed, initial encounter ([facility]  Resolved Problems:    * No resolved hospital problems.  *      Plan:  Laxative dc home with brace pain meds outpt f/u        Aníbal April, DO  10:21 AM  11/24/2022

## 2022-11-24 NOTE — PROGRESS NOTES
Physical Therapy  Physical Therapy Initial Assessment     Name: Rene Jnoes  : 1950  MRN: [id number]      Date of Service: 2022    Evaluating PT:  Vanessa Diaz PT, DPT [id number]    Room #:  6633/3937-Y  Diagnosis:  Lumbar compression fracture, [facility] [S32.000A]  PMHx/PSHx:  HLD  Procedure/Surgery:  none this admission  Precautions:  Falls, spinal, soft TLSO  Equipment Needs:  none, pt has SPC    SUBJECTIVE:    Pt lives with  in a 1 story home with 4 stairs to enter and 1 rail. Bed is on 1st floor and bath is on 1st floor. Pt ambulated with SPC PTA. OBJECTIVE:   Initial Evaluation  Date: 22 Treatment Short Term/ Long Term   Goals   AM-PAC 6 Clicks      Was pt agreeable to Eval/treatment? yes     Does pt have pain? Back pain, does not rate     Bed Mobility  Rolling: [place]  Supine to sit: [place]  Sit to supine: NT  Scooting: [place]  Rolling: Independent   Supine to sit: Independent   Sit to supine: Independent   Scooting: Independent    Transfers Sit to stand: CGA  Stand to sit: CGA  Stand pivot: CGA with SPC  Sit to stand: Independent   Stand to sit: Independent   Stand pivot: Mod I with SPC   Ambulation    10 feet with SPC CGA  >50 feet with SPC  Mod I   Stair negotiation: ascended and descended  NT  4 steps with 1 rail Mod I   ROM BUE:  Defer to OT note  BLE:  WFL     Strength BUE:  Defer to OT note  BLE:  grossly 3/5  WNL   Balance Sitting EOB:  SBA  Dynamic Standing:  CGA with SPC  Sitting EOB:  Independent   Dynamic Standing:   Mod I with SPC     Pt is A & O x 3  Sensation:  denies abnormalities  Edema:  none noted    Vitals:  HR and SPO2 on room air: WNL    Patient education  Pt educated on role of PT, spinal precautions, importance of continued OOB activity with TLSO    Patient response to education:   Pt verbalized understanding Pt demonstrated skill Pt requires further education in this area   yes yes yes     ASSESSMENT:    Conditions Requiring Skilled Therapeutic Intervention:    [x]Decreased strength     [x]Decreased ROM  [x]Decreased functional mobility  [x]Decreased balance   [x]Decreased endurance   []Decreased posture  []Decreased sensation  []Decreased coordination   []Decreased vision  []Decreased safety awareness   []Increased pain       Comments:  patient supine in bed upon entry and agreeable to PT evaluation. Pt able to roll for TLSO application prior to further mobility. Pt able to complete supine>sit with light assist to trunk. Pt able to stand and ambulate short distance in room with [facility] and hands on assist for safety as pt reaching for environment with available UE for support. No LOB noted. Pt left sitting in chair at end of session with all needs met. Pt reported increase in back pain with limited mobility. Patient to benefit from continued skilled PT at [place] to improve functional mobility and decrease fall risk. Treatment:  Patient practiced and was instructed in the following treatment:    Bed Mobility: VCs provided for sequencing and safety during mobility. Manual assist provided for completion of task. Transfer Training: Verbal and tactile cueing provided for sequencing and safety during mobility. Manual assist provided for completion of task  Gait Training: Ambulation with WW and verbal cues for proper technique and safety. Manual assist provided for completion of task   Patient Education: Education provided on importance of wearing TLSO for mobility, increasing tolerance for brace wearing, spinal precautions    Pt's/ family goals   1. None stated    Prognosis is good for reaching above PT goals. Patient and or family understand(s) diagnosis, prognosis, and plan of care.   yes    PHYSICAL THERAPY PLAN OF CARE:    PT POC is established based on physician order and patient diagnosis     Referring provider/PT Order:    11/22/22 1630  PT eval and treat  Start:  11/22/22 1630,   End:  11/22/22 1630,   ONE TIME,   Standing Count: 1 Occurrences,   R         Cruzito Mata DO     Diagnosis:  Lumbar compression fracture, closed, initial encounter ([facility] [S32.000A]  Specific instructions for next treatment:  progress mobility as appropriate    Current Treatment Recommendations:     [x] Strengthening to improve independence with functional mobility   [x] ROM to improve independence with functional mobility   [x] Balance Training to improve static/dynamic balance and to reduce fall risk  [x] Endurance Training to improve activity tolerance during functional mobility   [x] Transfer Training to improve safety and independence with all functional transfers   [x] Gait Training to improve gait mechanics, endurance and assess need for appropriate assistive device  [x] Stair Training in preparation for safe discharge home and/or into the community   [] Positioning to prevent skin breakdown and contractures  [x] Safety and Education Training   [x] Patient/Caregiver Education   [] HEP  [x] Other     PT long term treatment goals are located in above grid    Frequency of treatments: 2-5x/week x 1-2 weeks. Time in  0751  Time out  0805    Total Treatment Time  0 minutes     Evaluation Time includes thorough review of current medical information, gathering information on past medical history/social history and prior level of function, completion of standardized testing/informal observation of tasks, assessment of data and education on plan of care and goals.     CPT codes:  [x] Low Complexity PT evaluation 85791  [] Moderate Complexity PT evaluation 91136  [] High Complexity PT evaluation 18893  [] PT Re-evaluation 11381  [] Gait training 22465 - minutes  [] Manual therapy 53276 - minutes  [] Therapeutic activities 57554 - minutes  [] Therapeutic exercises 08019 - minutes  [] Neuromuscular reeducation 46139 - minutes     Rochelle Urena PT, DPT  [id number]

## 2022-11-24 NOTE — PROGRESS NOTES
Department of Neurosurgery  Attending Progress Note    CHIEF COMPLAINT:  seen for compression fractures    SUBJECTIVE: c/o back pain    ROS:  HEENT: negative for headache  CVS: negative for chest pain  Resp: negative for SOB  Musculoskeletal: positive for back pain    OBJECTIVE  Physical  VITALS:  /61   Pulse 92   Temp 97.6 °F (36.4 °C) (Temporal)   Resp 18   SpO2 97%   NEUROLOGIC:  Mental Status Exam:  Level of Alertness:   awake  Orientation:   person, place, time  Motor Exam:  Motor exam is symmetrical 5 out of 5 all extremities bilaterally  Sensory:  Sensory intact    Data  CBC:   Lab Results   Component Value Date/Time    WBC 13.7 11/22/2022 04:06 PM    RBC 5.41 11/22/2022 04:06 PM    HGB 16.4 11/22/2022 04:06 PM    HCT 49.4 11/22/2022 04:06 PM    MCV 91.3 11/22/2022 04:06 PM    MCH 30.3 11/22/2022 04:06 PM    MCHC 33.2 11/22/2022 04:06 PM    RDW 14.0 11/22/2022 04:06 PM     11/22/2022 04:06 PM    MPV 9.9 11/22/2022 04:06 PM     BMP:    Lab Results   Component Value Date/Time     11/22/2022 04:06 PM    K 4.2 11/22/2022 04:06 PM    K 3.1 11/17/2022 11:37 AM    CL 94 11/22/2022 04:06 PM    CO2 21 11/22/2022 04:06 PM    BUN 6 11/22/2022 04:06 PM    LABALBU 4.4 11/22/2022 04:06 PM    CREATININE 0.6 11/22/2022 04:06 PM    CALCIUM 10.9 11/22/2022 04:06 PM    GFRAA >60 11/14/2015 04:10 AM    LABGLOM >60 11/22/2022 04:06 PM    GLUCOSE 87 11/22/2022 04:06 PM     Current Inpatient Medications  Current Facility-Administered Medications: docusate sodium (COLACE) capsule 100 mg, 100 mg, Oral, BID  amLODIPine (NORVASC) tablet 5 mg, 5 mg, Oral, Daily  sodium chloride flush 0.9 % injection 5-40 mL, 5-40 mL, IntraVENous, 2 times per day  sodium chloride flush 0.9 % injection 5-40 mL, 5-40 mL, IntraVENous, PRN  0.9 % sodium chloride infusion, , IntraVENous, PRN  enoxaparin (LOVENOX) injection 40 mg, 40 mg, SubCUTAneous, Daily  ondansetron (ZOFRAN-ODT) disintegrating tablet 4 mg, 4 mg, Oral, Q8H PRN **OR** ondansetron (ZOFRAN) injection 4 mg, 4 mg, IntraVENous, Q6H PRN  polyethylene glycol (GLYCOLAX) packet 17 g, 17 g, Oral, Daily PRN  acetaminophen (TYLENOL) tablet 650 mg, 650 mg, Oral, Q6H PRN **OR** acetaminophen (TYLENOL) suppository 650 mg, 650 mg, Rectal, Q6H PRN  HYDROcodone-acetaminophen (NORCO) 5-325 MG per tablet 1 tablet, 1 tablet, Oral, Q6H PRN  morphine (PF) injection 2 mg, 2 mg, IntraVENous, Q4H PRN    ASSESSMENT AND PLAN:    Patient with compression fracture. Will try TLSO brace. Okay to D/C.   She can follow up in the office and if pain is not better, we will consider outpatient kyphoplasty    Malou Zepeda MD

## 2022-11-24 NOTE — PLAN OF CARE
Problem: Discharge Planning  Goal: Discharge to home or other facility with appropriate resources  Outcome: Progressing     Problem: Safety - Adult  Goal: Free from fall injury  Outcome: Progressing     Problem: ABCDS Injury Assessment  Goal: Absence of physical injury  Outcome: Progressing  Flowsheets (Taken 15/25/5625 9312 by Zaida Paul RN)  Absence of Physical Injury: Implement safety measures based on patient assessment     Problem: Pain  Goal: Verbalizes/displays adequate comfort level or baseline comfort level  Outcome: Progressing

## 2022-11-28 ENCOUNTER — HOSPITAL ENCOUNTER (EMERGENCY)
Age: 72
Discharge: HOME | End: 2022-11-28
Attending: EMERGENCY MEDICINE
Payer: MEDICARE

## 2022-11-28 VITALS
BODY MASS INDEX: 20.08 KG/M2 | WEIGHT: 117 LBS | SYSTOLIC BLOOD PRESSURE: 135 MMHG | OXYGEN SATURATION: 95 % | DIASTOLIC BLOOD PRESSURE: 82 MMHG | TEMPERATURE: 97.8 F | RESPIRATION RATE: 15 BRPM | HEART RATE: 98 BPM

## 2022-11-28 DIAGNOSIS — R42 LIGHTHEADEDNESS: ICD-10-CM

## 2022-11-28 DIAGNOSIS — E87.6 HYPOKALEMIA: Primary | ICD-10-CM

## 2022-11-28 DIAGNOSIS — R20.2 PARESTHESIAS: ICD-10-CM

## 2022-11-28 LAB
ANION GAP SERPL CALCULATED.3IONS-SCNC: 14 MMOL/L (ref 7–16)
ANION GAP SERPL CALCULATED.3IONS-SCNC: 20 MMOL/L (ref 7–16)
BACTERIA: ABNORMAL /HPF
BASOPHILS ABSOLUTE: 0.07 E9/L (ref 0–0.2)
BASOPHILS RELATIVE PERCENT: 0.8 % (ref 0–2)
BILIRUBIN URINE: NEGATIVE
BLOOD, URINE: NEGATIVE
BUN BLDV-MCNC: 6 MG/DL (ref 6–23)
BUN BLDV-MCNC: 9 MG/DL (ref 6–23)
CALCIUM SERPL-MCNC: 10.6 MG/DL (ref 8.6–10.2)
CALCIUM SERPL-MCNC: 9 MG/DL (ref 8.6–10.2)
CHLORIDE BLD-SCNC: 104 MMOL/L (ref 98–107)
CHLORIDE BLD-SCNC: 94 MMOL/L (ref 98–107)
CLARITY: CLEAR
CO2: 23 MMOL/L (ref 22–29)
CO2: 24 MMOL/L (ref 22–29)
COLOR: YELLOW
CREAT SERPL-MCNC: 0.5 MG/DL (ref 0.5–1)
CREAT SERPL-MCNC: 0.7 MG/DL (ref 0.5–1)
EKG ATRIAL RATE: 102 BPM
EKG P AXIS: 63 DEGREES
EKG P-R INTERVAL: 134 MS
EKG Q-T INTERVAL: 432 MS
EKG QRS DURATION: 66 MS
EKG QTC CALCULATION (BAZETT): 563 MS
EKG R AXIS: 39 DEGREES
EKG T AXIS: 61 DEGREES
EKG VENTRICULAR RATE: 102 BPM
EOSINOPHILS ABSOLUTE: 0.05 E9/L (ref 0.05–0.5)
EOSINOPHILS RELATIVE PERCENT: 0.6 % (ref 0–6)
GFR SERPL CREATININE-BSD FRML MDRD: >60 ML/MIN/1.73
GFR SERPL CREATININE-BSD FRML MDRD: >60 ML/MIN/1.73
GLUCOSE BLD-MCNC: 104 MG/DL (ref 74–99)
GLUCOSE BLD-MCNC: 69 MG/DL (ref 74–99)
GLUCOSE URINE: NEGATIVE MG/DL
HCT VFR BLD CALC: 46.9 % (ref 34–48)
HEMOGLOBIN: 15.4 G/DL (ref 11.5–15.5)
IMMATURE GRANULOCYTES #: 0.04 E9/L
IMMATURE GRANULOCYTES %: 0.5 % (ref 0–5)
KETONES, URINE: 15 MG/DL
LEUKOCYTE ESTERASE, URINE: NEGATIVE
LYMPHOCYTES ABSOLUTE: 0.68 E9/L (ref 1.5–4)
LYMPHOCYTES RELATIVE PERCENT: 8.2 % (ref 20–42)
MAGNESIUM: 2.2 MG/DL (ref 1.6–2.6)
MCH RBC QN AUTO: 30 PG (ref 26–35)
MCHC RBC AUTO-ENTMCNC: 32.8 % (ref 32–34.5)
MCV RBC AUTO: 91.2 FL (ref 80–99.9)
MONOCYTES ABSOLUTE: 0.57 E9/L (ref 0.1–0.95)
MONOCYTES RELATIVE PERCENT: 6.9 % (ref 2–12)
NEUTROPHILS ABSOLUTE: 6.86 E9/L (ref 1.8–7.3)
NEUTROPHILS RELATIVE PERCENT: 83 % (ref 43–80)
NITRITE, URINE: NEGATIVE
PDW BLD-RTO: 14.2 FL (ref 11.5–15)
PH UA: 6 (ref 5–9)
PLATELET # BLD: 347 E9/L (ref 130–450)
PMV BLD AUTO: 9.3 FL (ref 7–12)
POTASSIUM SERPL-SCNC: 2.7 MMOL/L (ref 3.5–5)
POTASSIUM SERPL-SCNC: 4.5 MMOL/L (ref 3.5–5)
PROTEIN UA: NEGATIVE MG/DL
RBC # BLD: 5.14 E12/L (ref 3.5–5.5)
RBC UA: ABNORMAL /HPF (ref 0–2)
SODIUM BLD-SCNC: 138 MMOL/L (ref 132–146)
SODIUM BLD-SCNC: 141 MMOL/L (ref 132–146)
SPECIFIC GRAVITY UA: <=1.005 (ref 1–1.03)
TROPONIN, HIGH SENSITIVITY: 23 NG/L (ref 0–9)
TROPONIN, HIGH SENSITIVITY: 26 NG/L (ref 0–9)
UROBILINOGEN, URINE: 0.2 E.U./DL
WBC # BLD: 8.3 E9/L (ref 4.5–11.5)
WBC UA: ABNORMAL /HPF (ref 0–5)

## 2022-11-28 PROCEDURE — 84484 ASSAY OF TROPONIN QUANT: CPT

## 2022-11-28 PROCEDURE — 83735 ASSAY OF MAGNESIUM: CPT

## 2022-11-28 PROCEDURE — 99284 EMERGENCY DEPT VISIT MOD MDM: CPT

## 2022-11-28 PROCEDURE — 80048 BASIC METABOLIC PNL TOTAL CA: CPT

## 2022-11-28 PROCEDURE — 96366 THER/PROPH/DIAG IV INF ADDON: CPT

## 2022-11-28 PROCEDURE — 96365 THER/PROPH/DIAG IV INF INIT: CPT

## 2022-11-28 PROCEDURE — 6360000002 HC RX W HCPCS: Performed by: STUDENT IN AN ORGANIZED HEALTH CARE EDUCATION/TRAINING PROGRAM

## 2022-11-28 PROCEDURE — 93005 ELECTROCARDIOGRAM TRACING: CPT | Performed by: PHYSICIAN ASSISTANT

## 2022-11-28 PROCEDURE — 93010 ELECTROCARDIOGRAM REPORT: CPT | Performed by: INTERNAL MEDICINE

## 2022-11-28 PROCEDURE — 6370000000 HC RX 637 (ALT 250 FOR IP): Performed by: STUDENT IN AN ORGANIZED HEALTH CARE EDUCATION/TRAINING PROGRAM

## 2022-11-28 PROCEDURE — 85025 COMPLETE CBC W/AUTO DIFF WBC: CPT

## 2022-11-28 PROCEDURE — 2580000003 HC RX 258: Performed by: STUDENT IN AN ORGANIZED HEALTH CARE EDUCATION/TRAINING PROGRAM

## 2022-11-28 PROCEDURE — 81001 URINALYSIS AUTO W/SCOPE: CPT

## 2022-11-28 RX ORDER — HYDROCODONE BITARTRATE AND ACETAMINOPHEN 5; 325 MG/1; MG/1
1 TABLET ORAL ONCE
Status: COMPLETED | OUTPATIENT
Start: 2022-11-28 | End: 2022-11-28

## 2022-11-28 RX ORDER — 0.9 % SODIUM CHLORIDE 0.9 %
1000 INTRAVENOUS SOLUTION INTRAVENOUS ONCE
Status: COMPLETED | OUTPATIENT
Start: 2022-11-28 | End: 2022-11-28

## 2022-11-28 RX ORDER — POTASSIUM CHLORIDE 7.45 MG/ML
10 INJECTION INTRAVENOUS
Status: COMPLETED | OUTPATIENT
Start: 2022-11-28 | End: 2022-11-28

## 2022-11-28 RX ADMIN — Medication 10 MEQ: at 18:02

## 2022-11-28 RX ADMIN — SODIUM CHLORIDE 1000 ML: 9 INJECTION, SOLUTION INTRAVENOUS at 16:39

## 2022-11-28 RX ADMIN — POTASSIUM BICARBONATE 40 MEQ: 782 TABLET, EFFERVESCENT ORAL at 16:34

## 2022-11-28 RX ADMIN — Medication 10 MEQ: at 20:16

## 2022-11-28 RX ADMIN — HYDROCODONE BITARTRATE AND ACETAMINOPHEN 1 TABLET: 5; 325 TABLET ORAL at 18:02

## 2022-11-28 RX ADMIN — Medication 10 MEQ: at 16:39

## 2022-11-28 RX ADMIN — Medication 10 MEQ: at 19:05

## 2022-11-28 ASSESSMENT — PAIN DESCRIPTION - DESCRIPTORS: DESCRIPTORS: ACHING;SORE

## 2022-11-28 ASSESSMENT — PAIN DESCRIPTION - LOCATION
LOCATION: BACK
LOCATION: BACK

## 2022-11-28 ASSESSMENT — ENCOUNTER SYMPTOMS
RHINORRHEA: 0
NAUSEA: 0
SHORTNESS OF BREATH: 0
DIARRHEA: 0
SORE THROAT: 0
VOMITING: 0
ABDOMINAL PAIN: 0
COUGH: 0
BACK PAIN: 1
WHEEZING: 0

## 2022-11-28 ASSESSMENT — PAIN SCALES - GENERAL
PAINLEVEL_OUTOF10: 6
PAINLEVEL_OUTOF10: 8

## 2022-11-28 ASSESSMENT — PAIN DESCRIPTION - ORIENTATION
ORIENTATION: LOWER
ORIENTATION: LOWER

## 2022-11-28 ASSESSMENT — PAIN - FUNCTIONAL ASSESSMENT: PAIN_FUNCTIONAL_ASSESSMENT: 0-10

## 2022-11-28 ASSESSMENT — PAIN DESCRIPTION - PAIN TYPE: TYPE: ACUTE PAIN

## 2022-11-28 NOTE — ED NOTES
FIRST PROVIDER CONTACT ASSESSMENT NOTE       Department of Emergency Medicine                 First Provider Note            22  1:42 PM EST    Date of Encounter: No admission date for patient encounter. Patient Name: Valente Sandhu  : 1950  MRN: [id number]    Chief Complaint: Dizziness and Leg Problem (Feels like bilat legs are numb from knee down and weak. DC on )      History of Present Illness:   Valente Sandhu is a 67 y.o. female who presents to the ED for b/l leg numbness, medial aspect from knees to feet. Denies loss of control of her bowels or bladder, saddle anesthesia. Patient also complains of dizziness, feeling lightheaded and generalized weakness. Focused Physical Exam:  VS:    ED Triage Vitals [22 1329]   BP Temp Temp src Heart Rate Resp SpO2 Height Weight   -- 97.8 °F (36.6 °C) -- (!) 111 -- 97 % -- --        Physical Ex: Constitutional: Alert and non-toxic. Medical History:  has a past medical history of Bruising tendency and Hyperlipidemia. Surgical History:  has no past surgical history on file. Social History:  reports that she has never smoked. She has never used smokeless tobacco. She reports that she does not drink alcohol and does not use drugs. Family History: family history includes Arthritis in her mother; Heart Disease in her father; Lupus in her mother; Stroke in her father.     Allergies: Latex     Initial Plan of Care: Initiate Treatment-Testing, Proceed toTreatment Area When Bed Available for ED Attending/MLP to Continue Care      ---END OF FIRST PROVIDER CONTACT ASSESSMENT NOTE---  Electronically signed by MYLES Thornton   DD: 22       Giles Thornton  22 [id number]

## 2022-11-29 NOTE — ED PROVIDER NOTES
[facility]  ED Provider Note  Department of Emergency Medicine     ED Room: OPAL/OPAL      Written by: Maryruth Denver, DO  Patient Name: Claire Ybarra  Attending Provider: No att. providers found  Admit Date: 2022  4:01 PM  MRN: [id number]    : 1950        Chief Complaint   Patient presents with    Dizziness    Leg Problem     Feels like bilat legs are numb from knee down and weak. DC on Thanksgiving for lumbar compression fx    - Chief complaint    HPI   Claire Ybarra is a 67 y.o. female presenting to the ED for evaluation of Dizziness and Leg Problem (Feels like bilat legs are numb from knee down and weak. DC on Thanksgiving for lumbar compression fx)      History obtained from patient. Patient has a past medical history of recent admission for lumbar compression fracture, discharged to home on  with TLSO brace. States since discharge home she has had decreased appetite, states she is trying to stay hydrated but overall has had significantly decreased p.o. intake. Today patient is presenting for evaluation of lightheadedness, tingling sensation and muscle twitches to her bilateral lower extremities. Denies any weakness anywhere, saddle anesthesia, falls or injuries, incontinence or retention of bowel or bladder, fevers, abdominal pain, chest pain or palpitations, nausea or vomiting or diarrhea. Patient's complaints are gradual onset, moderate in severity, and persistent. There are no specific aggravating or alleviating factors. Review of Systems   Constitutional:  Positive for appetite change (decreased). Negative for chills and fever. HENT:  Negative for congestion, rhinorrhea and sore throat. Eyes:  Negative for visual disturbance. Respiratory:  Negative for cough, shortness of breath and wheezing. Cardiovascular:  Negative for chest pain and palpitations. Gastrointestinal:  Negative for abdominal pain, diarrhea, nausea and vomiting. Genitourinary:  Negative for dysuria and frequency. Musculoskeletal:  Positive for back pain. Negative for myalgias. Skin:  Negative for rash and wound. Neurological:  Positive for light-headedness. Negative for weakness and headaches. \"Tingling\" and \"muscle twitches\" of her legs     Psychiatric/Behavioral:  Negative for confusion. All other systems reviewed and are negative. Physical Exam  Vitals and nursing note reviewed. Constitutional:       General: She is not in acute distress. Appearance: She is not toxic-appearing. HENT:      Head: Normocephalic and atraumatic. Right Ear: External ear normal.      Left Ear: External ear normal.      Nose: Nose normal. No rhinorrhea. Mouth/Throat:      Mouth: Mucous membranes are moist.      Pharynx: Oropharynx is clear. Eyes:      Extraocular Movements: Extraocular movements intact. Conjunctiva/sclera: Conjunctivae normal.      Pupils: Pupils are equal, round, and reactive to light. Cardiovascular:      Rate and Rhythm: Normal rate and regular rhythm. Pulses: Normal pulses. Heart sounds: Normal heart sounds. Pulmonary:      Effort: Pulmonary effort is normal. No respiratory distress. Breath sounds: Normal breath sounds. No wheezing or rales. Abdominal:      General: Bowel sounds are normal.      Palpations: Abdomen is soft. Tenderness: There is no abdominal tenderness. There is no guarding. Musculoskeletal:         General: No tenderness. Normal range of motion. Cervical back: Normal range of motion and neck supple. Right lower leg: No edema. Left lower leg: No edema. Skin:     General: Skin is warm and dry. Capillary Refill: Capillary refill takes less than 2 seconds. Coloration: Skin is not jaundiced or pale. Findings: No rash. Neurological:      General: No focal deficit present. Mental Status: She is alert and oriented to person, place, and time. Sensory: No sensory deficit. Motor: No weakness. Coordination: Coordination normal.      Gait: Gait normal.   Psychiatric:         Mood and Affect: Mood normal.         Behavior: Behavior normal.        Procedures       MDM     Amount and/or Complexity of Data Reviewed  Clinical lab tests: reviewed  Tests in the medicine section of CPT®: reviewed         ED Course as of 11/28/22 9053   Mon Nov 28, 2022 2236 On reevaluation patient symptoms are markedly improved, she feels much better. She was given some p.o. ginger ale for her glucose 69; she is alert, oriented, overall well-appearing. Her repeat BMP shows improved potassium and resolved anion gap, suspect she was slightly dehydrated as well. I encouraged increased p.o. intake and patient agrees. Feel she is stable and appropriate for discharge. [VG]      ED Course User Index  [VG] Bonnie Irene DO         Medical Decision Making: This is a 67 y.o. female presenting for evaluation of twitching and tingling to her bilateral lower extremities. On evaluation no focal neurologic deficits are noted; she ambulated to the department with a TLSO brace in place; she does have known recent lumbar compression fracture. No weakness or sensory deficits are noted on examination. Abdomen soft nontender. Lungs CTA bilaterally. Patient just states that her bilateral lower extremities feel \"weird\" and that her muscles are twitching occasionally. Labs reviewed, significant for hypokalemia 2.7; this was replaced both IV and orally with improvement to 4.5. AG initially 20, improved to 14 after IV fluids, suspect these abnormalities are secondary due to poor p.o. intake since discharge home from the hospital recently. Repeat BMP shows glucose 69, patient drank a ginger ale without issue. Her symptoms are resolved after IV fluids and potassium replacement. With her TLSO brace in place, she is ambulating at her baseline and her symptoms are improved. Exam remains nonfocal.  Given the improvement of her symptoms and labs, feel she is stable and appropriate for discharge. She will follow-up outpatient with her PCP. Results and plan were discussed, patient voiced understanding and is amenable. Strict return precautions were discussed. See ED COURSE for additional MDM. EKG reviewed and interpreted by me: This EKG is signed by emergency department physician. Sinus tachycardia, normal axis, no ST elevation; nonspecific T wave abnormalities, rate 102, , QRS 66; there is significant baseline artifact, EKG interprets QTC is 563 though this does not appear to be accurate. No previous EKG available for comparison. Labs & imaging were reviewed and interpreted, see RESULTS. I have personally reviewed all laboratory and imaging results for this patient. I have discussed this patient with my attending, who has seen the patient and agrees with this disposition. Patient was seen and evaluated by myself and my attending No att. providers found. Assessment and Plan discussed with attending provider, please see attestation for final plan of care.         --------------------------------------------- PAST HISTORY ---------------------------------------------  Past Medical History:  has a past medical history of Bruising tendency and Hyperlipidemia. Past Surgical History:  has no past surgical history on file. Social History:  reports that she has never smoked. She has never used smokeless tobacco. She reports that she does not drink alcohol and does not use drugs. Family History: family history includes Arthritis in her mother; Heart Disease in her father; Lupus in her mother; Stroke in her father. Unless otherwise noted, family history is non contributory. The patients home medications have been reviewed.     Allergies: Latex    -------------------------------------------------- RESULTS -------------------------------------------------  Labs:  Results for orders placed or performed during the hospital encounter of 11/28/22   CBC with Auto Differential   Result Value Ref Range    WBC 8.3 4.5 - 11.5 E9/L    RBC 5.14 3.50 - 5.50 E12/L    Hemoglobin 15.4 11.5 - 15.5 g/dL    Hematocrit 46.9 34.0 - 48.0 %    MCV 91.2 80.0 - 99.9 fL    MCH 30.0 26.0 - 35.0 pg    MCHC 32.8 32.0 - 34.5 %    RDW 14.2 11.5 - 15.0 fL    Platelets 522 807 - 876 E9/L    MPV 9.3 7.0 - 12.0 fL    Neutrophils % 83.0 (H) 43.0 - 80.0 %    Immature Granulocytes % 0.5 0.0 - 5.0 %    Lymphocytes % 8.2 (L) 20.0 - 42.0 %    Monocytes % 6.9 2.0 - 12.0 %    Eosinophils % 0.6 0.0 - 6.0 %    Basophils % 0.8 0.0 - 2.0 %    Neutrophils Absolute 6.86 1.80 - 7.30 E9/L    Immature Granulocytes # 0.04 E9/L    Lymphocytes Absolute 0.68 (L) 1.50 - 4.00 E9/L    Monocytes Absolute 0.57 0.10 - 0.95 E9/L    Eosinophils Absolute 0.05 0.05 - 0.50 E9/L    Basophils Absolute 0.07 0.00 - 0.20 V6/O   Basic Metabolic Panel   Result Value Ref Range    Sodium 138 132 - 146 mmol/L    Potassium 2.7 (LL) 3.5 - 5.0 mmol/L    Chloride 94 (L) 98 - 107 mmol/L    CO2 24 22 - 29 mmol/L    Anion Gap 20 (H) 7 - 16 mmol/L    Glucose 104 (H) 74 - 99 mg/dL    BUN 9 6 - 23 mg/dL    Creatinine 0.7 0.5 - 1.0 mg/dL    Est, Glom Filt Rate >60 >=60 mL/min/1.73    Calcium 10.6 (H) 8.6 - 10.2 mg/dL   Troponin   Result Value Ref Range    Troponin, High Sensitivity 23 (H) 0 - 9 ng/L   Magnesium   Result Value Ref Range    Magnesium 2.2 1.6 - 2.6 mg/dL   Urinalysis with Microscopic   Result Value Ref Range    Color, UA Yellow Straw/Yellow    Clarity, UA Clear Clear    Glucose, Ur Negative Negative mg/dL    Bilirubin Urine Negative Negative    Ketones, Urine 15 (A) Negative mg/dL    Specific Gravity, UA <=1.005 1.005 - 1.030    Blood, Urine Negative Negative    pH, UA 6.0 5.0 - 9.0    Protein, UA Negative Negative mg/dL    Urobilinogen, Urine 0.2 <2.0 E.U./dL    Nitrite, Urine Negative Negative    Leukocyte Esterase, Urine Negative Negative    WBC, UA NONE 0 - 5 /HPF    RBC, UA NONE 0 - 2 /HPF    Bacteria, UA NONE SEEN None Seen /HPF   Troponin   Result Value Ref Range    Troponin, High Sensitivity 26 (H) 0 - 9 ng/L   Basic Metabolic Panel   Result Value Ref Range    Sodium 141 132 - 146 mmol/L    Potassium 4.5 3.5 - 5.0 mmol/L    Chloride 104 98 - 107 mmol/L    CO2 23 22 - 29 mmol/L    Anion Gap 14 7 - 16 mmol/L    Glucose 69 (L) 74 - 99 mg/dL    BUN 6 6 - 23 mg/dL    Creatinine 0.5 0.5 - 1.0 mg/dL    Est, Glom Filt Rate >60 >=60 mL/min/1.73    Calcium 9.0 8.6 - 10.2 mg/dL   EKG 12 Lead   Result Value Ref Range    Ventricular Rate 102 BPM    Atrial Rate 102 BPM    P-R Interval 134 ms    QRS Duration 66 ms    Q-T Interval 432 ms    QTc Calculation (Bazett) 563 ms    P Axis 63 degrees    R Axis 39 degrees    T Axis 61 degrees       Radiology:  No orders to display       Interpreted by the radiologist unless otherwise specified.      ------------------------- NURSING NOTES AND VITALS REVIEWED ---------------------------  Date / Time Roomed:  11/28/2022  4:01 PM  ED Bed Assignment:  OPAL/OPAL    The nursing notes within the ED encounter and vital signs as below have been reviewed by myself. /82   Pulse 98   Temp 97.8 °F (36.6 °C)   Resp 15   Wt 117 lb (53.1 kg)   SpO2 95%   BMI 20.08 kg/m²   Oxygen Saturation Interpretation: Normal    The patients available past medical records and past encounters were reviewed. ------------------------------------------ PROGRESS NOTES ------------------------------------------  11:14 PM EST  I have spoken with the patient and her   and discussed todays results, in addition to providing specific details for the plan of care and counseling regarding the diagnosis and prognosis. Their questions are answered at this time and they are agreeable with the plan. I discussed at length with them reasons for immediate return here for re evaluation. They will followup with their primary care physician by calling their office tomorrow. --------------------------------- ADDITIONAL PROVIDER NOTES ---------------------------------  At this time the patient is without objective evidence of an acute process requiring hospitalization or inpatient management. They have remained hemodynamically stable throughout their entire ED visit and are stable for discharge with outpatient follow-up. The plan has been discussed in detail and they are aware of the specific conditions for emergent return, as well as the importance of follow-up. Discharge Medication List as of 11/28/2022 10:56 PM          Diagnosis:  1. Hypokalemia    2. Lightheadedness    3. Paresthesias        Disposition:  Patient's disposition: Discharge to home  Patient's condition is stable. Alyx Rucker D.O. PGY-3     Resident Physician     Emergency Medicine      11/28/2022 11:13 PM      NOTE: This report was transcribed using voice recognition software.  Every effort was made to ensure accuracy; however, inadvertent computerized transcription errors may be present             Alyx Rucker DO  Resident  11/28/22 [address], [place]  11/29/22 1652

## 2022-12-01 ENCOUNTER — HOSPITAL ENCOUNTER (EMERGENCY)
Age: 72
Discharge: LEFT BEFORE DISCHARGE | End: 2022-12-01

## 2022-12-03 ENCOUNTER — HOSPITAL ENCOUNTER (INPATIENT)
Age: 72
LOS: 1 days | Discharge: HOME | DRG: 392 | End: 2022-12-05
Attending: EMERGENCY MEDICINE | Admitting: INTERNAL MEDICINE
Payer: MEDICARE

## 2022-12-03 ENCOUNTER — APPOINTMENT (OUTPATIENT)
Dept: GENERAL RADIOLOGY | Age: 72
DRG: 392 | End: 2022-12-03
Payer: MEDICARE

## 2022-12-03 ENCOUNTER — APPOINTMENT (OUTPATIENT)
Dept: CT IMAGING | Age: 72
DRG: 392 | End: 2022-12-03
Payer: MEDICARE

## 2022-12-03 DIAGNOSIS — R33.9 URINARY RETENTION: ICD-10-CM

## 2022-12-03 DIAGNOSIS — K59.00 CONSTIPATION, UNSPECIFIED CONSTIPATION TYPE: Primary | ICD-10-CM

## 2022-12-03 DIAGNOSIS — S32.000D COMPRESSION FRACTURE OF LUMBAR VERTEBRA WITH ROUTINE HEALING, UNSPECIFIED LUMBAR VERTEBRAL LEVEL, SUBSEQUENT ENCOUNTER: ICD-10-CM

## 2022-12-03 LAB
ALBUMIN SERPL-MCNC: 3.8 G/DL (ref 3.5–5.2)
ALP BLD-CCNC: 122 U/L (ref 35–104)
ALT SERPL-CCNC: 13 U/L (ref 0–32)
ANION GAP SERPL CALCULATED.3IONS-SCNC: 13 MMOL/L (ref 7–16)
AST SERPL-CCNC: 19 U/L (ref 0–31)
BILIRUB SERPL-MCNC: 0.2 MG/DL (ref 0–1.2)
BUN BLDV-MCNC: 12 MG/DL (ref 6–23)
CALCIUM SERPL-MCNC: 9.8 MG/DL (ref 8.6–10.2)
CHLORIDE BLD-SCNC: 96 MMOL/L (ref 98–107)
CO2: 26 MMOL/L (ref 22–29)
CREAT SERPL-MCNC: 0.7 MG/DL (ref 0.5–1)
GFR SERPL CREATININE-BSD FRML MDRD: >60 ML/MIN/1.73
GLUCOSE BLD-MCNC: 118 MG/DL (ref 74–99)
HCT VFR BLD CALC: 46.8 % (ref 34–48)
HEMOGLOBIN: 15 G/DL (ref 11.5–15.5)
LIPASE: 34 U/L (ref 13–60)
MAGNESIUM: 2.4 MG/DL (ref 1.6–2.6)
MCH RBC QN AUTO: 29.7 PG (ref 26–35)
MCHC RBC AUTO-ENTMCNC: 32.1 % (ref 32–34.5)
MCV RBC AUTO: 92.7 FL (ref 80–99.9)
PDW BLD-RTO: 15 FL (ref 11.5–15)
PLATELET # BLD: 364 E9/L (ref 130–450)
PMV BLD AUTO: 9.6 FL (ref 7–12)
POTASSIUM SERPL-SCNC: 4.3 MMOL/L (ref 3.5–5)
RBC # BLD: 5.05 E12/L (ref 3.5–5.5)
SODIUM BLD-SCNC: 135 MMOL/L (ref 132–146)
TOTAL PROTEIN: 6.8 G/DL (ref 6.4–8.3)
TROPONIN, HIGH SENSITIVITY: 19 NG/L (ref 0–9)
TROPONIN, HIGH SENSITIVITY: 22 NG/L (ref 0–9)
WBC # BLD: 7 E9/L (ref 4.5–11.5)

## 2022-12-03 PROCEDURE — 71045 X-RAY EXAM CHEST 1 VIEW: CPT

## 2022-12-03 PROCEDURE — 83735 ASSAY OF MAGNESIUM: CPT

## 2022-12-03 PROCEDURE — 85027 COMPLETE CBC AUTOMATED: CPT

## 2022-12-03 PROCEDURE — 83690 ASSAY OF LIPASE: CPT

## 2022-12-03 PROCEDURE — 74177 CT ABD & PELVIS W/CONTRAST: CPT

## 2022-12-03 PROCEDURE — 51702 INSERT TEMP BLADDER CATH: CPT

## 2022-12-03 PROCEDURE — 51798 US URINE CAPACITY MEASURE: CPT

## 2022-12-03 PROCEDURE — 84484 ASSAY OF TROPONIN QUANT: CPT

## 2022-12-03 PROCEDURE — 99285 EMERGENCY DEPT VISIT HI MDM: CPT

## 2022-12-03 PROCEDURE — 80053 COMPREHEN METABOLIC PANEL: CPT

## 2022-12-03 PROCEDURE — 6360000004 HC RX CONTRAST MEDICATION: Performed by: RADIOLOGY

## 2022-12-03 PROCEDURE — 93005 ELECTROCARDIOGRAM TRACING: CPT | Performed by: EMERGENCY MEDICINE

## 2022-12-03 RX ADMIN — IOPAMIDOL 75 ML: 755 INJECTION, SOLUTION INTRAVENOUS at 20:59

## 2022-12-03 ASSESSMENT — PAIN - FUNCTIONAL ASSESSMENT: PAIN_FUNCTIONAL_ASSESSMENT: NONE - DENIES PAIN

## 2022-12-03 NOTE — ED PROVIDER NOTES
Zoe Snell 9      Pt Name: Savita Cartwright  MRN: [id number]  [place] 1950  Date of evaluation: 12/3/2022      CHIEF COMPLAINT       Chief Complaint   Patient presents with    Chest Pain     Epigastric area chest pain started at about 14:00 while laying in bed. She reports she took 2 adult strength ASA at about 16:00. HPI  Savita Cartwright is a 67 y.o. female  presents with chest pain and abdominal pain. Chest pain started today, states pain is like pressure with radiating to epigastric region. Pain is intermittent, worsened by eating. S Associated with constipation since thanksgiving. Patient states she has been in and out of the hospital for the past month,  lumbar compression fracture, discharged to home on 11/24 with TLSO brace. States her back pain has completely subsided. Describes symptoms mild  in severity with no alleviating or exacerbating factors. Denies any fever, chills, n/v, headache, dizziness, vision changes, neck tenderness or stiffness, weakness,  palpitations, leg swelling/tenderness, sob, cough, dysuria, hematuria, diarrhea, constipation. Except as noted above the remainder of the review of systems was reviewed and negative. Review of Systems   Constitutional:  Negative for appetite change, chills, fatigue and fever. HENT:  Negative for congestion and sore throat. Eyes:  Negative for visual disturbance. Respiratory:  Negative for cough, choking, chest tightness and shortness of breath. Cardiovascular:  Negative for chest pain, palpitations and leg swelling. Gastrointestinal:  Positive for abdominal pain and constipation. Negative for blood in stool, diarrhea, nausea and vomiting. Endocrine: Negative for polyphagia. Genitourinary:  Negative for decreased urine volume, difficulty urinating, flank pain and hematuria. Musculoskeletal:  Negative for arthralgias, gait problem, joint swelling and myalgias.    Skin:  Negative for color change, pallor, rash and wound. Neurological:  Negative for dizziness, tremors, seizures, syncope, weakness, light-headedness, numbness and headaches. Hematological:  Negative for adenopathy. Does not bruise/bleed easily. Psychiatric/Behavioral:  Negative for confusion and hallucinations. All other systems reviewed and are negative. Physical Exam  Constitutional:       General: She is not in acute distress. Appearance: Normal appearance. She is normal weight. She is not ill-appearing, toxic-appearing or diaphoretic. HENT:      Head: Normocephalic and atraumatic. Right Ear: External ear normal.      Left Ear: External ear normal.      Nose: Nose normal. No congestion or rhinorrhea. Mouth/Throat:      Mouth: Mucous membranes are moist.      Pharynx: Oropharynx is clear. No oropharyngeal exudate or posterior oropharyngeal erythema. Eyes:      Conjunctiva/sclera: Conjunctivae normal.      Pupils: Pupils are equal, round, and reactive to light. Cardiovascular:      Rate and Rhythm: Normal rate and regular rhythm. Pulses: Normal pulses. Heart sounds: Normal heart sounds. Pulmonary:      Effort: Pulmonary effort is normal.      Breath sounds: Normal breath sounds. Abdominal:      General: Abdomen is flat. Bowel sounds are normal. There is no distension. Palpations: Abdomen is soft. There is no mass. Tenderness: There is abdominal tenderness. There is no right CVA tenderness, left CVA tenderness or guarding. Hernia: No hernia is present. Musculoskeletal:      Cervical back: Normal range of motion. Skin:     General: Skin is warm and dry. Capillary Refill: Capillary refill takes less than 2 seconds. Neurological:      General: No focal deficit present. Mental Status: She is alert and oriented to person, place, and time. Mental status is at baseline.    Psychiatric:         Mood and Affect: Mood normal.         Behavior: Behavior normal. Thought Content: Thought content normal.        Procedures     MDM     Amount and/or Complexity of Data Reviewed  Decide to obtain previous medical records or to obtain history from someone other than the patient: yes            67 y.o. female  presents with chest pain and abdominal pain. While in the ED patient was hemodynamically stable, afebrile, nontoxic-appearing, in no respiratory distress. Labs remarkable for elevated trop but trending down with normal abdominal labs. CT abd remarkable for compression fractures,L2 fracture present on previous MRI. Moderate amount of stools in the colon without obstruction. Patient received enema with no relief. Patient had over 1000cc in bladder, moreno placed. Spoke to Neurosurgery who states nothing for them to do and she can follow up outpatient. Patient states she is unable to care for herself with brace and moreno and now this constipation. She will be admitted to medicine team for further management. Patient in agreement with plan of admission. ED Course as of 22 1153   Sat Dec 03, 2022   1815 EC  Sinus tachycardia  Low voltage  Age undetermined septal infarct qtc 402 qrs 62ms no acute changes, new changes though from previous ecg [SUSHIL]    IMPRESSION:  1. New compression fracture involving L2 with loss of height of approximately  30% and mild retropulsion. 2. Stable fractures involving T11, T12, L1, and inferior endplate of L3.  3. Moderate amount of stool throughout the colon. 4. No bowel obstruction, free air, or focal inflammatory changes in the  abdomen or pelvis. [TC]    Spoke o Dr. Enrrique Martinez, she states patient can follow up outpatient with neurosurgery.    L2 fx is present on previous MRI.  [TC]      ED Course User Index  [SUSHIL] Sendy Almazan DO  [TC] Johnathan Gunter MD       --------------------------------------------- PAST HISTORY ---------------------------------------------  Past Medical History:  has a past medical history of Bruising tendency, Hyperlipidemia, Hypertension, and Osteoporosis. Past Surgical History:  has no past surgical history on file. Social History:  reports that she has never smoked. She has never used smokeless tobacco. She reports that she does not drink alcohol and does not use drugs. Family History: family history includes Arthritis in her mother; Heart Disease in her father; Lupus in her mother; Stroke in her father. The patients home medications have been reviewed.     Allergies: Latex    -------------------------------------------------- RESULTS -------------------------------------------------    LABS:  Results for orders placed or performed during the hospital encounter of 12/03/22   CBC   Result Value Ref Range    WBC 7.0 4.5 - 11.5 E9/L    RBC 5.05 3.50 - 5.50 E12/L    Hemoglobin 15.0 11.5 - 15.5 g/dL    Hematocrit 46.8 34.0 - 48.0 %    MCV 92.7 80.0 - 99.9 fL    MCH 29.7 26.0 - 35.0 pg    MCHC 32.1 32.0 - 34.5 %    RDW 15.0 11.5 - 15.0 fL    Platelets 499 041 - 244 E9/L    MPV 9.6 7.0 - 12.0 fL   Comprehensive Metabolic Panel   Result Value Ref Range    Sodium 135 132 - 146 mmol/L    Potassium 4.3 3.5 - 5.0 mmol/L    Chloride 96 (L) 98 - 107 mmol/L    CO2 26 22 - 29 mmol/L    Anion Gap 13 7 - 16 mmol/L    Glucose 118 (H) 74 - 99 mg/dL    BUN 12 6 - 23 mg/dL    Creatinine 0.7 0.5 - 1.0 mg/dL    Est, Glom Filt Rate >60 >=60 mL/min/1.73    Calcium 9.8 8.6 - 10.2 mg/dL    Total Protein 6.8 6.4 - 8.3 g/dL    Albumin 3.8 3.5 - 5.2 g/dL    Total Bilirubin 0.2 0.0 - 1.2 mg/dL    Alkaline Phosphatase 122 (H) 35 - 104 U/L    ALT 13 0 - 32 U/L    AST 19 0 - 31 U/L   Troponin   Result Value Ref Range    Troponin, High Sensitivity 22 (H) 0 - 9 ng/L   Troponin   Result Value Ref Range    Troponin, High Sensitivity 19 (H) 0 - 9 ng/L   Lipase   Result Value Ref Range    Lipase 34 13 - 60 U/L   Magnesium   Result Value Ref Range    Magnesium 2.4 1.6 - 2.6 mg/dL   Basic Metabolic Panel w/ Reflex to MG Result Value Ref Range    Sodium 135 132 - 146 mmol/L    Potassium reflex Magnesium 3.8 3.5 - 5.0 mmol/L    Chloride 98 98 - 107 mmol/L    CO2 24 22 - 29 mmol/L    Anion Gap 13 7 - 16 mmol/L    Glucose 94 74 - 99 mg/dL    BUN 9 6 - 23 mg/dL    Creatinine 0.6 0.5 - 1.0 mg/dL    Est, Glom Filt Rate >60 >=60 mL/min/1.73    Calcium 9.5 8.6 - 10.2 mg/dL   CBC with Auto Differential   Result Value Ref Range    WBC 8.9 4.5 - 11.5 E9/L    RBC 4.71 3.50 - 5.50 E12/L    Hemoglobin 13.9 11.5 - 15.5 g/dL    Hematocrit 43.6 34.0 - 48.0 %    MCV 92.6 80.0 - 99.9 fL    MCH 29.5 26.0 - 35.0 pg    MCHC 31.9 (L) 32.0 - 34.5 %    RDW 14.9 11.5 - 15.0 fL    Platelets 675 864 - 244 E9/L    MPV 9.2 7.0 - 12.0 fL    Neutrophils % 72.3 43.0 - 80.0 %    Immature Granulocytes % 0.4 0.0 - 5.0 %    Lymphocytes % 13.3 (L) 20.0 - 42.0 %    Monocytes % 10.2 2.0 - 12.0 %    Eosinophils % 3.1 0.0 - 6.0 %    Basophils % 0.7 0.0 - 2.0 %    Neutrophils Absolute 6.46 1.80 - 7.30 E9/L    Immature Granulocytes # 0.04 E9/L    Lymphocytes Absolute 1.19 (L) 1.50 - 4.00 E9/L    Monocytes Absolute 0.91 0.10 - 0.95 E9/L    Eosinophils Absolute 0.28 0.05 - 0.50 E9/L    Basophils Absolute 0.06 0.00 - 0.20 E9/L   Troponin   Result Value Ref Range    Troponin, High Sensitivity 24 (H) 0 - 9 ng/L   Troponin   Result Value Ref Range    Troponin, High Sensitivity 26 (H) 0 - 9 ng/L   EKG 12 Lead   Result Value Ref Range    Ventricular Rate 104 BPM    Atrial Rate 104 BPM    P-R Interval 132 ms    QRS Duration 62 ms    Q-T Interval 306 ms    QTc Calculation (Bazett) 402 ms    P Axis 46 degrees    R Axis 7 degrees    T Axis 47 degrees       RADIOLOGY:  CT ABDOMEN PELVIS W IV CONTRAST Additional Contrast? None   Final Result   1. New compression fracture involving L2 with loss of height of approximately   30% and mild retropulsion. 2. Stable fractures involving T11, T12, L1, and inferior endplate of L3.   3. Moderate amount of stool throughout the colon.    4. No bowel obstruction, free air, or focal inflammatory changes in the   abdomen or pelvis. XR CHEST PORTABLE   Final Result   No acute process. ------------------------- NURSING NOTES AND VITALS REVIEWED ---------------------------  Date / Time Roomed:  12/3/2022  5:15 PM  ED Bed Assignment:  [id number]    The nursing notes within the ED encounter and vital signs as below have been reviewed. Patient Vitals for the past 24 hrs:   BP Temp Temp src Pulse Resp SpO2 Height Weight   12/04/22 0726 119/65 97.7 °F (36.5 °C) Oral (!) 115 16 96 % -- --   12/04/22 0400 -- -- -- -- -- -- 5' 4\" (1.626 m) 116 lb (52.6 kg)   12/04/22 0300 136/74 98.2 °F (36.8 °C) Oral 97 16 97 % -- --   12/04/22 0139 (!) 140/74 98 °F (36.7 °C) Oral 97 14 96 % -- --   12/03/22 2018 137/78 -- -- 96 16 95 % -- --   12/03/22 1730 137/75 98 °F (36.7 °C) -- (!) 105 16 95 % 5' 4\" (1.626 m) 117 lb (53.1 kg)       Oxygen Saturation Interpretation: Normal    ------------------------------------------ PROGRESS NOTES ------------------------------------------  Re-evaluation(s):  Time: 7227  Patients symptoms show no change  Repeat physical examination is not changed    Counseling:  I have spoken with the patient and discussed todays results, in addition to providing specific details for the plan of care and counseling regarding the diagnosis and prognosis. Their questions are answered at this time and they are agreeable with the plan of admission.    --------------------------------- ADDITIONAL PROVIDER NOTES ---------------------------------  Consultations:   Spoke with April for Dr. Milagros Fischer. Discussed case. They will admit the patient.   This patient's ED course included: a personal history and physicial examination, re-evaluation prior to disposition, multiple bedside re-evaluations, IV medications, cardiac monitoring, continuous pulse oximetry, and complex medical decision making and emergency management    This patient has remained hemodynamically stable during their ED course. Diagnosis:  1. Constipation, unspecified constipation type    2. Compression fracture of lumbar vertebra with routine healing, unspecified lumbar vertebral level, subsequent encounter    3. Urinary retention        Disposition:  Patient's disposition: Admit to med/surg floor  Patient's condition is stable.          Alba Croft MD  Resident  12/04/22 0394

## 2022-12-04 PROBLEM — Z78.9 UNABLE TO CARE FOR SELF: Status: ACTIVE | Noted: 2022-12-04

## 2022-12-04 PROBLEM — K59.00 CONSTIPATION: Status: ACTIVE | Noted: 2022-12-04

## 2022-12-04 LAB
ANION GAP SERPL CALCULATED.3IONS-SCNC: 13 MMOL/L (ref 7–16)
BASOPHILS ABSOLUTE: 0.06 E9/L (ref 0–0.2)
BASOPHILS RELATIVE PERCENT: 0.7 % (ref 0–2)
BUN BLDV-MCNC: 9 MG/DL (ref 6–23)
CALCIUM SERPL-MCNC: 9.5 MG/DL (ref 8.6–10.2)
CHLORIDE BLD-SCNC: 98 MMOL/L (ref 98–107)
CO2: 24 MMOL/L (ref 22–29)
CREAT SERPL-MCNC: 0.6 MG/DL (ref 0.5–1)
EKG ATRIAL RATE: 104 BPM
EKG P AXIS: 46 DEGREES
EKG P-R INTERVAL: 132 MS
EKG Q-T INTERVAL: 306 MS
EKG QRS DURATION: 62 MS
EKG QTC CALCULATION (BAZETT): 402 MS
EKG R AXIS: 7 DEGREES
EKG T AXIS: 47 DEGREES
EKG VENTRICULAR RATE: 104 BPM
EOSINOPHILS ABSOLUTE: 0.28 E9/L (ref 0.05–0.5)
EOSINOPHILS RELATIVE PERCENT: 3.1 % (ref 0–6)
GFR SERPL CREATININE-BSD FRML MDRD: >60 ML/MIN/1.73
GLUCOSE BLD-MCNC: 94 MG/DL (ref 74–99)
HCT VFR BLD CALC: 43.6 % (ref 34–48)
HEMOGLOBIN: 13.9 G/DL (ref 11.5–15.5)
IMMATURE GRANULOCYTES #: 0.04 E9/L
IMMATURE GRANULOCYTES %: 0.4 % (ref 0–5)
LYMPHOCYTES ABSOLUTE: 1.19 E9/L (ref 1.5–4)
LYMPHOCYTES RELATIVE PERCENT: 13.3 % (ref 20–42)
MCH RBC QN AUTO: 29.5 PG (ref 26–35)
MCHC RBC AUTO-ENTMCNC: 31.9 % (ref 32–34.5)
MCV RBC AUTO: 92.6 FL (ref 80–99.9)
MONOCYTES ABSOLUTE: 0.91 E9/L (ref 0.1–0.95)
MONOCYTES RELATIVE PERCENT: 10.2 % (ref 2–12)
NEUTROPHILS ABSOLUTE: 6.46 E9/L (ref 1.8–7.3)
NEUTROPHILS RELATIVE PERCENT: 72.3 % (ref 43–80)
PDW BLD-RTO: 14.9 FL (ref 11.5–15)
PLATELET # BLD: 344 E9/L (ref 130–450)
PMV BLD AUTO: 9.2 FL (ref 7–12)
POTASSIUM REFLEX MAGNESIUM: 3.8 MMOL/L (ref 3.5–5)
RBC # BLD: 4.71 E12/L (ref 3.5–5.5)
SODIUM BLD-SCNC: 135 MMOL/L (ref 132–146)
TROPONIN, HIGH SENSITIVITY: 24 NG/L (ref 0–9)
TROPONIN, HIGH SENSITIVITY: 26 NG/L (ref 0–9)
WBC # BLD: 8.9 E9/L (ref 4.5–11.5)

## 2022-12-04 PROCEDURE — 51798 US URINE CAPACITY MEASURE: CPT

## 2022-12-04 PROCEDURE — 36415 COLL VENOUS BLD VENIPUNCTURE: CPT

## 2022-12-04 PROCEDURE — 1200000000 HC SEMI PRIVATE

## 2022-12-04 PROCEDURE — 2580000003 HC RX 258: Performed by: NURSE PRACTITIONER

## 2022-12-04 PROCEDURE — 97161 PT EVAL LOW COMPLEX 20 MIN: CPT

## 2022-12-04 PROCEDURE — 6370000000 HC RX 637 (ALT 250 FOR IP): Performed by: NURSE PRACTITIONER

## 2022-12-04 PROCEDURE — 97530 THERAPEUTIC ACTIVITIES: CPT

## 2022-12-04 PROCEDURE — 80048 BASIC METABOLIC PNL TOTAL CA: CPT

## 2022-12-04 PROCEDURE — 6360000002 HC RX W HCPCS: Performed by: NURSE PRACTITIONER

## 2022-12-04 PROCEDURE — 84484 ASSAY OF TROPONIN QUANT: CPT

## 2022-12-04 PROCEDURE — 85025 COMPLETE CBC W/AUTO DIFF WBC: CPT

## 2022-12-04 PROCEDURE — 93010 ELECTROCARDIOGRAM REPORT: CPT | Performed by: INTERNAL MEDICINE

## 2022-12-04 PROCEDURE — 6370000000 HC RX 637 (ALT 250 FOR IP): Performed by: INTERNAL MEDICINE

## 2022-12-04 RX ORDER — AMLODIPINE BESYLATE 5 MG/1
5 TABLET ORAL DAILY
Status: DISCONTINUED | OUTPATIENT
Start: 2022-12-04 | End: 2022-12-05 | Stop reason: HOSPADM

## 2022-12-04 RX ORDER — ACETAMINOPHEN 325 MG/1
650 TABLET ORAL EVERY 6 HOURS PRN
Status: DISCONTINUED | OUTPATIENT
Start: 2022-12-04 | End: 2022-12-05 | Stop reason: HOSPADM

## 2022-12-04 RX ORDER — LACTULOSE 10 G/15ML
20 SOLUTION ORAL 2 TIMES DAILY
Status: DISCONTINUED | OUTPATIENT
Start: 2022-12-04 | End: 2022-12-05 | Stop reason: HOSPADM

## 2022-12-04 RX ORDER — SODIUM CHLORIDE 9 MG/ML
INJECTION, SOLUTION INTRAVENOUS PRN
Status: DISCONTINUED | OUTPATIENT
Start: 2022-12-04 | End: 2022-12-05 | Stop reason: HOSPADM

## 2022-12-04 RX ORDER — TRAMADOL HYDROCHLORIDE 50 MG/1
25 TABLET ORAL ONCE
Status: COMPLETED | OUTPATIENT
Start: 2022-12-04 | End: 2022-12-04

## 2022-12-04 RX ORDER — SODIUM CHLORIDE 0.9 % (FLUSH) 0.9 %
5-40 SYRINGE (ML) INJECTION EVERY 12 HOURS SCHEDULED
Status: DISCONTINUED | OUTPATIENT
Start: 2022-12-04 | End: 2022-12-05 | Stop reason: HOSPADM

## 2022-12-04 RX ORDER — SODIUM PHOSPHATE, DIBASIC AND SODIUM PHOSPHATE, MONOBASIC 7; 19 G/133ML; G/133ML
1 ENEMA RECTAL 2 TIMES DAILY PRN
Status: DISCONTINUED | OUTPATIENT
Start: 2022-12-04 | End: 2022-12-05 | Stop reason: HOSPADM

## 2022-12-04 RX ORDER — SODIUM CHLORIDE 0.9 % (FLUSH) 0.9 %
5-40 SYRINGE (ML) INJECTION PRN
Status: DISCONTINUED | OUTPATIENT
Start: 2022-12-04 | End: 2022-12-05 | Stop reason: HOSPADM

## 2022-12-04 RX ORDER — DOCUSATE SODIUM 100 MG/1
100 CAPSULE, LIQUID FILLED ORAL DAILY
Status: DISCONTINUED | OUTPATIENT
Start: 2022-12-04 | End: 2022-12-04

## 2022-12-04 RX ORDER — ENOXAPARIN SODIUM 100 MG/ML
40 INJECTION SUBCUTANEOUS DAILY
Status: DISCONTINUED | OUTPATIENT
Start: 2022-12-04 | End: 2022-12-05 | Stop reason: HOSPADM

## 2022-12-04 RX ORDER — ACETAMINOPHEN 650 MG/1
650 SUPPOSITORY RECTAL EVERY 6 HOURS PRN
Status: DISCONTINUED | OUTPATIENT
Start: 2022-12-04 | End: 2022-12-05 | Stop reason: HOSPADM

## 2022-12-04 RX ORDER — ACETAMINOPHEN 325 MG/1
TABLET ORAL
Status: DISPENSED
Start: 2022-12-04 | End: 2022-12-04

## 2022-12-04 RX ORDER — DOCUSATE SODIUM 100 MG/1
100 CAPSULE, LIQUID FILLED ORAL 2 TIMES DAILY
Status: DISCONTINUED | OUTPATIENT
Start: 2022-12-04 | End: 2022-12-04

## 2022-12-04 RX ORDER — METHOCARBAMOL 500 MG/1
500 TABLET, FILM COATED ORAL 4 TIMES DAILY PRN
Status: DISCONTINUED | OUTPATIENT
Start: 2022-12-04 | End: 2022-12-05 | Stop reason: HOSPADM

## 2022-12-04 RX ORDER — SODIUM CHLORIDE 9 MG/ML
INJECTION, SOLUTION INTRAVENOUS CONTINUOUS
Status: DISCONTINUED | OUTPATIENT
Start: 2022-12-04 | End: 2022-12-05 | Stop reason: HOSPADM

## 2022-12-04 RX ORDER — SODIUM PHOSPHATE, DIBASIC AND SODIUM PHOSPHATE, MONOBASIC 7; 19 G/133ML; G/133ML
1 ENEMA RECTAL
Status: COMPLETED | OUTPATIENT
Start: 2022-12-04 | End: 2022-12-04

## 2022-12-04 RX ORDER — BISACODYL 10 MG
10 SUPPOSITORY, RECTAL RECTAL DAILY PRN
Status: DISCONTINUED | OUTPATIENT
Start: 2022-12-04 | End: 2022-12-05 | Stop reason: HOSPADM

## 2022-12-04 RX ORDER — DOCUSATE SODIUM 100 MG/1
200 CAPSULE, LIQUID FILLED ORAL 2 TIMES DAILY
Status: DISCONTINUED | OUTPATIENT
Start: 2022-12-04 | End: 2022-12-05 | Stop reason: HOSPADM

## 2022-12-04 RX ADMIN — METHOCARBAMOL 500 MG: 500 TABLET ORAL at 20:09

## 2022-12-04 RX ADMIN — BISACODYL 10 MG: 10 SUPPOSITORY RECTAL at 05:26

## 2022-12-04 RX ADMIN — ENOXAPARIN SODIUM 40 MG: 100 INJECTION SUBCUTANEOUS at 09:23

## 2022-12-04 RX ADMIN — SODIUM CHLORIDE, PRESERVATIVE FREE 10 ML: 5 INJECTION INTRAVENOUS at 20:09

## 2022-12-04 RX ADMIN — SODIUM PHOSPHATE 1 ENEMA: 7; 19 ENEMA RECTAL at 13:00

## 2022-12-04 RX ADMIN — DOCUSATE SODIUM 100 MG: 100 CAPSULE, LIQUID FILLED ORAL at 09:23

## 2022-12-04 RX ADMIN — DOCUSATE SODIUM 200 MG: 100 CAPSULE, LIQUID FILLED ORAL at 20:09

## 2022-12-04 RX ADMIN — METHOCARBAMOL 500 MG: 500 TABLET ORAL at 09:31

## 2022-12-04 RX ADMIN — LACTULOSE 20 G: 20 SOLUTION ORAL at 20:09

## 2022-12-04 RX ADMIN — SODIUM CHLORIDE: 9 INJECTION, SOLUTION INTRAVENOUS at 09:22

## 2022-12-04 RX ADMIN — METHOCARBAMOL 500 MG: 500 TABLET ORAL at 13:11

## 2022-12-04 RX ADMIN — SODIUM CHLORIDE, PRESERVATIVE FREE 10 ML: 5 INJECTION INTRAVENOUS at 09:23

## 2022-12-04 RX ADMIN — TRAMADOL HYDROCHLORIDE 25 MG: 50 TABLET, COATED ORAL at 03:58

## 2022-12-04 ASSESSMENT — PAIN DESCRIPTION - DESCRIPTORS
DESCRIPTORS: DISCOMFORT
DESCRIPTORS: DISCOMFORT;SPASM;STABBING
DESCRIPTORS: ACHING;DISCOMFORT
DESCRIPTORS: ACHING;DISCOMFORT;SPASM
DESCRIPTORS: DISCOMFORT;SPASM

## 2022-12-04 ASSESSMENT — PAIN SCALES - GENERAL
PAINLEVEL_OUTOF10: 7
PAINLEVEL_OUTOF10: 7
PAINLEVEL_OUTOF10: 0
PAINLEVEL_OUTOF10: 10
PAINLEVEL_OUTOF10: 8
PAINLEVEL_OUTOF10: 7
PAINLEVEL_OUTOF10: 1
PAINLEVEL_OUTOF10: 8
PAINLEVEL_OUTOF10: 8
PAINLEVEL_OUTOF10: 1

## 2022-12-04 ASSESSMENT — ENCOUNTER SYMPTOMS
CONSTIPATION: 1
SHORTNESS OF BREATH: 0
COLOR CHANGE: 0
DIARRHEA: 0
ABDOMINAL PAIN: 1
SORE THROAT: 0
COUGH: 0
CHEST TIGHTNESS: 0
NAUSEA: 0
CHOKING: 0
VOMITING: 0
BLOOD IN STOOL: 0

## 2022-12-04 ASSESSMENT — PAIN DESCRIPTION - ONSET
ONSET: ON-GOING

## 2022-12-04 ASSESSMENT — PAIN DESCRIPTION - PAIN TYPE
TYPE: ACUTE PAIN
TYPE: CHRONIC PAIN;ACUTE PAIN
TYPE: CHRONIC PAIN;ACUTE PAIN
TYPE: ACUTE PAIN

## 2022-12-04 ASSESSMENT — PAIN DESCRIPTION - LOCATION
LOCATION: ABDOMEN
LOCATION: BACK
LOCATION: GENERALIZED;BACK;ABDOMEN
LOCATION: BACK

## 2022-12-04 ASSESSMENT — PAIN DESCRIPTION - ORIENTATION
ORIENTATION: LOWER
ORIENTATION: LOWER;MID
ORIENTATION: LOWER;MID
ORIENTATION: LOWER
ORIENTATION: LEFT;UPPER
ORIENTATION: LOWER;MID

## 2022-12-04 ASSESSMENT — PAIN DESCRIPTION - FREQUENCY
FREQUENCY: CONTINUOUS
FREQUENCY: INTERMITTENT

## 2022-12-04 ASSESSMENT — PAIN - FUNCTIONAL ASSESSMENT
PAIN_FUNCTIONAL_ASSESSMENT: PREVENTS OR INTERFERES SOME ACTIVE ACTIVITIES AND ADLS

## 2022-12-04 NOTE — PROGRESS NOTES
Initial Eval      Attending Provider:  Ana Mitchell MD    Evaluating PT:  Ezekiel Emmanuel PT, RRT, CEAS    Room #:  9373/5802-S  Diagnosis:  No Dx provided in note  Pertinent PMHx/PSHx:  L-comp Fx with progressing L2 Fx/MRI-CT scan, Given TLSO 2/2 L-comp Fx's Nov 24, 2022  Procedure/Surgery:  NA  Precautions:  Lumbar vertebral comp Fx's, TLSO use, general,   Equipment Needs:  TLSO, SC    SUBJECTIVE:    Pt lives with Hsb in a 1 story home with 4 stairs and 1 rail to enter. Pt ambulated with Sc and TLSO indep PTA. OBJECTIVE:   Initial Evaluation  Date: 12/4/22 Treatment Short Term/ Long Term   Goals   Was pt agreeable to Eval/treatment? Yes     Does pt have pain? LBP      Bed Mobility  Rolling: Indep  Supine to sit: Indep/S  Sit to supine: NA  Scooting: Indep     Transfers Sit to stand: S/Indep  Stand to sit: S/Indep  Stand pivot: NA  Indep all surfaces   Ambulation   20 feet with SC and TLSO indep/S  100 feet with SC and TLSO Indep/S   Stair negotiation: ascended and descended [id number] to use toilet and no BM days   4-5 steps with 1 rail S/Indep   ROM [facility]     MMT [facility]     AM-PAC 6 Clicks 01/00, steps NA       Pt is A & O x 3   Sensation:  Pt denies numbness and tingling to extremities  Edema:  NA  Balance: sitting:Indep and standing: indep/S with SC and TLSO  Endurance: Functional    Patient education  Pt educated on supine to EOB transfer tech and did show this on herself. Patient response to education:   Pt verbalized understanding Pt demonstrated skill Pt requires further education in this area   Y Y N     ASSESSMENT:    Comments:  Reports LBP has imp a bit from the meds. States is indep with TLSO Óscar/Doff at home by herself or with Hsb assistance. In no distress. States is willing to get OOB and go to bathroom as not ready to walk too long or participate in therapy. Transfer to EOB was pretty much indep with no hands on assist at all.   Sat on EOB and RN assisted with TTLSO bracing but she states she is indep with Óscar. No inc LBP with sitting on EOB observed or reported. Transfer to stand was also pretty much indep w/o any staggered or delayed mvmt behaviors. Amb with SC indep to room door and then to toilet with S/Indep levels of assist.  No drifting, lateral veering or LOB with SC use. No inc pain lumbar reported with standing mobility. RN remains with care in bathroom on my exit. Treatment:  Patient practiced and was instructed in the following treatment:    Eval    Pt's/ family goals   1. NA    Patient and or family understand(s) diagnosis, prognosis, and plan of care. PLAN:    PT care will be provided in accordance with the objectives noted above. Exercises and functional mobility practice will be used as well as appropriate assistive devices or modalities to obtain goals. Patient and family education will also be administered as needed. Frequency of treatments: daily    Total Treatment Time  10 minutes     Evaluation Time includes thorough review of current medical information, gathering information on past medical history/social history and prior level of function, completion of standardized testing/informal observation of tasks, assessment of data and education on plan of care and goals.     CPT codes:  [x] Low Complexity PT evaluation 39371  [] Moderate Complexity PT evaluation 68514  [] High Complexity PT evaluation 82142  [] PT Re-evaluation 68819  [] Gait training 39262 ** minutes  [] Manual therapy 74103 ** minutes  [x] Therapeutic activities 56446 ** minutes  [] Therapeutic exercises 68072 ** minutes  [] Neuromuscular reeducation 33035 ** minutes    Armond Bachelor PT, RRT, CEAS

## 2022-12-04 NOTE — CARE COORDINATION
Introduced my self and provided explanation of CM role to patient. Patient is awake, alert, and aware of current diagnosis and treatment plan including pian control, iv fluids, bowel regimen. Patient states she resides at home with her spouse. She uses a cane and wears a TLSO brace. She continues to c/o back pain and was scheduled to see Dr. Meryl Meélndez 12/6/2022. She is disappointed as she waited 6 weeks for the appointment and feels she may miss it. She has no hx of c nor [facility]. She says that she does not feel she can tolerate any therapy until her pain is managed. PT and OT evals are ordered here and will serve as crucial components of dc planning process. Patient is established with a pcp and denies any issue with retail pharmaceutical coverage. Final discharge planning needs remain unknown. Will follow along with  and assist with discharge planning as necessary. Alexus Allan.  Talya, MSN RN  [facility] Case Management  [phone]

## 2022-12-04 NOTE — PROGRESS NOTES
April, NP notified of Troponin of 24 and that pt still with significant pain after tramadol.    Electronically signed by Linda Pérez RN on 12/4/2022 at 5:38 AM

## 2022-12-04 NOTE — PROGRESS NOTES
Althea Memory on call and notifed of very minimal results from suppository and pt still in significant pain.    Electronically signed by Jelena Navarro RN on 12/4/2022 at 7:42 AM

## 2022-12-04 NOTE — PLAN OF CARE
Problem: Pain  Goal: Verbalizes/displays adequate comfort level or baseline comfort level  Outcome: Progressing     Problem: Safety - Adult  Goal: Free from fall injury  Outcome: Progressing     Problem: ABCDS Injury Assessment  Goal: Absence of physical injury  Outcome: Progressing  Flowsheets (Taken 12/4/2022 0325 by Carlton Durant RN)  Absence of Physical Injury: Implement safety measures based on patient assessment

## 2022-12-04 NOTE — H&P
[facility]  History and Physical      CHIEF COMPLAINT:    Chief Complaint   Patient presents with    Chest Pain     Epigastric area chest pain started at about 14:00 while laying in bed. She reports she took 2 adult strength ASA at about 16:00. Patient of Harini Quijano MD presents with:  Constipation    History of Present Illness:    Patient is a 70-year-old female with past medical history of HLD, HTN, osteoporosis, lumbar compression fractures currently fitted in TLSO brace who presents to the emergency room for chest pain. Patient describes her chest pain as pressure radiating into the Epigastric region worsened with eating associated with constipation since Thanksgiving. Patient was recently diagnosed with new lumbar spine compression fractures in which she was fitted for a TLSO brace. She states she was prescribed narcotics to help with the pain while working with physical therapy. She notes that her bowels have not moved much since Thanksgiving. On arrival to emergency patient had a CT abdomen which revealed moderate amount of stool throughout the colon with a new compression fracture involving the L2 with loss of height approximately 30% although MRI of the lumbar spine completed on 11/21 notes L2 fracture although states it had been progressing since prior CT study. Labs are relatively unremarkable. Patient has been mildly tachycardic some mild elevation in troponins could be related (22-19-24-26). Patient is admitted to [place] [address] for further work-up and treatment. On evaluation she is comfortable in no acute distress cannot have a bowel movement yet. REVIEW OF SYSTEMS:  Pertinent negatives are above in HPI. 10 point ROS otherwise negative. Past Medical History:   Diagnosis Date    Bruising tendency     Hyperlipidemia     Hypertension     Osteoporosis          History reviewed. No pertinent surgical history.     Medications Prior to Admission:    Medications Prior to Admission: docusate sodium (COLACE, DULCOLAX) 100 MG CAPS, Take 100 mg by mouth 2 times daily  VITAMIN D PO, Take 1 Dose by mouth every other day  amLODIPine (NORVASC) 5 MG tablet, Take 5 mg by mouth daily    Note that the patient's home medications were reviewed and the above list is accurate to the best of my knowledge at the time of the exam.    Allergies:    Latex    Social History:    reports that she has never smoked. She has never used smokeless tobacco. She reports that she does not drink alcohol and does not use drugs. Family History:   family history includes Arthritis in her mother; Heart Disease in her father; Lupus in her mother; Stroke in her father. PHYSICAL EXAM:    Vitals:  /65   Pulse (!) 115   Temp 97.7 °F (36.5 °C) (Oral)   Resp 16   Ht 5' 4\" (1.626 m)   Wt 116 lb (52.6 kg)   SpO2 96%   BMI 19.91 kg/m²       General appearance: NAD, conversant  Eyes: Sclerae anicteric, PERRLA  HEENT: AT/NC, MMM  Neck: FROM, supple, no thyromegaly  Lymph: No cervical / supraclavicular lymphadenopathy  Lungs: Clear to auscultation, WOB normal  CV: RRR, no MRGs, no lower extremity edema  Abdomen: Soft, non-tender; no masses or HSM, +BS  Extremities: FROM without synovitis. No clubbing or cyanosis of the hands. Skin: no rash, induration, lesions, or ulcers  Psych: Calm and cooperative. Normal judgement and insight. Normal mood and affect. Neuro: Alert and interactive, face symmetric, speech fluent. LABS:  All labs reviewed.   Of note:  CBC:   Lab Results   Component Value Date/Time    WBC 8.9 12/04/2022 04:30 AM    RBC 4.71 12/04/2022 04:30 AM    HGB 13.9 12/04/2022 04:30 AM    HCT 43.6 12/04/2022 04:30 AM    MCV 92.6 12/04/2022 04:30 AM    MCH 29.5 12/04/2022 04:30 AM    MCHC 31.9 12/04/2022 04:30 AM    RDW 14.9 12/04/2022 04:30 AM     12/04/2022 04:30 AM    MPV 9.2 12/04/2022 04:30 AM     CMP:    Lab Results   Component Value Date/Time     12/04/2022 04:30 AM    K 3.8 12/04/2022 04:30 AM    CL 98 12/04/2022 04:30 AM    CO2 24 12/04/2022 04:30 AM    BUN 9 12/04/2022 04:30 AM    CREATININE 0.6 12/04/2022 04:30 AM    GFRAA >60 11/14/2015 04:10 AM    LABGLOM >60 12/04/2022 04:30 AM    GLUCOSE 94 12/04/2022 04:30 AM    PROT 6.8 12/03/2022 06:21 PM    LABALBU 3.8 12/03/2022 06:21 PM    CALCIUM 9.5 12/04/2022 04:30 AM    BILITOT 0.2 12/03/2022 06:21 PM    ALKPHOS 122 12/03/2022 06:21 PM    AST 19 12/03/2022 06:21 PM    ALT 13 12/03/2022 06:21 PM       Imaging:  CXR: Negative  CT abdomen pelvis: New compression fractures involving L2 with loss of height of approximately 30% and mild retropulsion. Stable fractures involving T11 T12-L1 and inferior endplate of L3. Moderate amount of stool throughout the colon. No obstruction free air or focal inflammatory changes in the abdomen or pelvis. EKG:  Sinus tachycardia  Low voltage QRS  Septal infarct , age undetermined    Telemetry:  Not on telemetry    ASSESSMENT/PLAN:  Principal Problem:    Constipation  Active Problems:    Unable to care for self  Resolved Problems:    * No resolved hospital problems.  *    Patient is a 48-year-old female admitted to [place] [address] for  Abdominal pain associated with constipation  -Monitor labs  -Dulcolax 10 mg suppository given, minimal results  -Daily twice daily Colace  -Fleets enema x1, as needed enema, mag citrate  -[email or web address]  -Lactulose if still no results with above    Chest pain described as epigastric pain  -Worsened with eating  -22-19-24-26  -EKG with on determined age septal infarct, ST  -Add PPI    History of recent compression fracture of the L-spine  -TLSO brace  -CT abdomen revealing new compression fracture involving the L2 although noted on MRI from 11/21 but states the L2 fracture has progressed.  -She has a follow-up appointment with Dr. Karen Hernandez on Tuesday which she would like to keep  -PT and OT  -There is no neurosurgery available at [address] [place] [address]      Medication for other comorbidities continue as appropriate dose adjustment as necessary.     DVT prophylaxis Lovenox  PT OT  Discharge planning-hopeful for discharge tomorrow 12/5/2022 if she has a bowel movement    Code status: Full  Requires inpatient level of care

## 2022-12-05 VITALS
RESPIRATION RATE: 18 BRPM | HEIGHT: 64 IN | SYSTOLIC BLOOD PRESSURE: 127 MMHG | DIASTOLIC BLOOD PRESSURE: 67 MMHG | TEMPERATURE: 97.4 F | WEIGHT: 116 LBS | OXYGEN SATURATION: 93 % | HEART RATE: 92 BPM | BODY MASS INDEX: 19.81 KG/M2

## 2022-12-05 LAB
ANION GAP SERPL CALCULATED.3IONS-SCNC: 9 MMOL/L (ref 7–16)
BASOPHILS ABSOLUTE: 0.05 E9/L (ref 0–0.2)
BASOPHILS RELATIVE PERCENT: 1 % (ref 0–2)
BUN BLDV-MCNC: 7 MG/DL (ref 6–23)
CALCIUM SERPL-MCNC: 8.7 MG/DL (ref 8.6–10.2)
CHLORIDE BLD-SCNC: 104 MMOL/L (ref 98–107)
CO2: 23 MMOL/L (ref 22–29)
CREAT SERPL-MCNC: 0.6 MG/DL (ref 0.5–1)
EOSINOPHILS ABSOLUTE: 0.45 E9/L (ref 0.05–0.5)
EOSINOPHILS RELATIVE PERCENT: 9.3 % (ref 0–6)
GFR SERPL CREATININE-BSD FRML MDRD: >60 ML/MIN/1.73
GLUCOSE BLD-MCNC: 89 MG/DL (ref 74–99)
HCT VFR BLD CALC: 39.5 % (ref 34–48)
HEMOGLOBIN: 12.6 G/DL (ref 11.5–15.5)
IMMATURE GRANULOCYTES #: 0.01 E9/L
IMMATURE GRANULOCYTES %: 0.2 % (ref 0–5)
LYMPHOCYTES ABSOLUTE: 1.16 E9/L (ref 1.5–4)
LYMPHOCYTES RELATIVE PERCENT: 23.9 % (ref 20–42)
MCH RBC QN AUTO: 29.6 PG (ref 26–35)
MCHC RBC AUTO-ENTMCNC: 31.9 % (ref 32–34.5)
MCV RBC AUTO: 92.7 FL (ref 80–99.9)
MONOCYTES ABSOLUTE: 0.6 E9/L (ref 0.1–0.95)
MONOCYTES RELATIVE PERCENT: 12.4 % (ref 2–12)
NEUTROPHILS ABSOLUTE: 2.58 E9/L (ref 1.8–7.3)
NEUTROPHILS RELATIVE PERCENT: 53.2 % (ref 43–80)
PDW BLD-RTO: 15.1 FL (ref 11.5–15)
PLATELET # BLD: 304 E9/L (ref 130–450)
PMV BLD AUTO: 9.3 FL (ref 7–12)
POTASSIUM SERPL-SCNC: 3.8 MMOL/L (ref 3.5–5)
RBC # BLD: 4.26 E12/L (ref 3.5–5.5)
SODIUM BLD-SCNC: 136 MMOL/L (ref 132–146)
WBC # BLD: 4.9 E9/L (ref 4.5–11.5)

## 2022-12-05 PROCEDURE — 80048 BASIC METABOLIC PNL TOTAL CA: CPT

## 2022-12-05 PROCEDURE — 2580000003 HC RX 258: Performed by: NURSE PRACTITIONER

## 2022-12-05 PROCEDURE — 85025 COMPLETE CBC W/AUTO DIFF WBC: CPT

## 2022-12-05 PROCEDURE — 36415 COLL VENOUS BLD VENIPUNCTURE: CPT

## 2022-12-05 PROCEDURE — 6370000000 HC RX 637 (ALT 250 FOR IP): Performed by: INTERNAL MEDICINE

## 2022-12-05 PROCEDURE — 6370000000 HC RX 637 (ALT 250 FOR IP): Performed by: NURSE PRACTITIONER

## 2022-12-05 PROCEDURE — 97165 OT EVAL LOW COMPLEX 30 MIN: CPT

## 2022-12-05 PROCEDURE — 6360000002 HC RX W HCPCS: Performed by: NURSE PRACTITIONER

## 2022-12-05 RX ORDER — PANTOPRAZOLE SODIUM 40 MG/1
40 TABLET, DELAYED RELEASE ORAL
Status: DISCONTINUED | OUTPATIENT
Start: 2022-12-05 | End: 2022-12-05 | Stop reason: HOSPADM

## 2022-12-05 RX ORDER — PANTOPRAZOLE SODIUM 40 MG/1
40 TABLET, DELAYED RELEASE ORAL
Qty: 30 TABLET | Refills: 3 | Status: ON HOLD | OUTPATIENT
Start: 2022-12-05

## 2022-12-05 RX ORDER — METHOCARBAMOL 500 MG/1
500 TABLET, FILM COATED ORAL 4 TIMES DAILY PRN
Qty: 40 TABLET | Refills: 0 | Status: SHIPPED | OUTPATIENT
Start: 2022-12-05 | End: 2022-12-05 | Stop reason: HOSPADM

## 2022-12-05 RX ADMIN — ENOXAPARIN SODIUM 40 MG: 100 INJECTION SUBCUTANEOUS at 09:52

## 2022-12-05 RX ADMIN — DOCUSATE SODIUM 200 MG: 100 CAPSULE, LIQUID FILLED ORAL at 09:53

## 2022-12-05 RX ADMIN — METHOCARBAMOL 500 MG: 500 TABLET ORAL at 02:42

## 2022-12-05 RX ADMIN — SODIUM CHLORIDE: 9 INJECTION, SOLUTION INTRAVENOUS at 05:45

## 2022-12-05 RX ADMIN — ACETAMINOPHEN 650 MG: 325 TABLET ORAL at 05:51

## 2022-12-05 RX ADMIN — ACETAMINOPHEN 650 MG: 325 TABLET ORAL at 12:26

## 2022-12-05 RX ADMIN — PANTOPRAZOLE SODIUM 40 MG: 40 TABLET, DELAYED RELEASE ORAL at 09:53

## 2022-12-05 ASSESSMENT — PAIN DESCRIPTION - ONSET
ONSET: ON-GOING

## 2022-12-05 ASSESSMENT — PAIN DESCRIPTION - LOCATION
LOCATION: BACK;HEAD
LOCATION: ABDOMEN;BACK
LOCATION: ABDOMEN;BACK
LOCATION: BACK;ABDOMEN
LOCATION: BACK

## 2022-12-05 ASSESSMENT — PAIN DESCRIPTION - DESCRIPTORS
DESCRIPTORS: ACHING;CRAMPING
DESCRIPTORS: ACHING;CRAMPING
DESCRIPTORS: SPASM;DISCOMFORT
DESCRIPTORS: ACHING;SPASM
DESCRIPTORS: BURNING;DISCOMFORT

## 2022-12-05 ASSESSMENT — PAIN DESCRIPTION - ORIENTATION
ORIENTATION: RIGHT;LEFT
ORIENTATION: RIGHT;LEFT
ORIENTATION: LOWER

## 2022-12-05 ASSESSMENT — PAIN DESCRIPTION - PAIN TYPE
TYPE: ACUTE PAIN;CHRONIC PAIN
TYPE: ACUTE PAIN

## 2022-12-05 ASSESSMENT — PAIN DESCRIPTION - FREQUENCY
FREQUENCY: CONTINUOUS

## 2022-12-05 ASSESSMENT — PAIN SCALES - GENERAL
PAINLEVEL_OUTOF10: 4
PAINLEVEL_OUTOF10: 8
PAINLEVEL_OUTOF10: 1
PAINLEVEL_OUTOF10: 3
PAINLEVEL_OUTOF10: 0
PAINLEVEL_OUTOF10: 2
PAINLEVEL_OUTOF10: 8

## 2022-12-05 ASSESSMENT — PAIN - FUNCTIONAL ASSESSMENT: PAIN_FUNCTIONAL_ASSESSMENT: PREVENTS OR INTERFERES SOME ACTIVE ACTIVITIES AND ADLS

## 2022-12-05 NOTE — PROGRESS NOTES
11/21 but states the L2 fracture has progressed.  -She has a follow-up appointment with Dr. Lori Dorantes on Tuesday which she would like to keep  -PT and OT  -There is no neurosurgery available at [facility]     12/5/22:  -Multiple BM's documented, continue Colace BID   -Continue PPI for epigastric pain, will order for discharge  -can likely discharge home as PT [facility] 19/24  -Okay for discharge from medicine standpoint today    Code status: Full   Consultants: none   DVT Prophylaxis Lovenox   PT/OT  Discharge planning possibly d/c [id number] with PCP within 1 week of discharge      9:30 AM  12/5/2022

## 2022-12-05 NOTE — PROGRESS NOTES
CLINICAL PHARMACY NOTE: MEDS TO BEDS    Total # of Prescriptions Filled: 2   The following medications were delivered to the patient:  Pantoprazole 40mg  Methocarbamol 500mg    Additional Documentation:

## 2022-12-05 NOTE — PROGRESS NOTES
OCCUPATIONAL THERAPY INITIAL EVALUATION     [address] [facility], [address] [place] [address]                                                  Patient Name: Caleb Finn  MRN: [id number]  : 1950  Room: [address] [place] [address]    Evaluating OT: HAIDER Rome, OTR/L [id number]  Referring Provider:ENRICO Lara CNP  Specific Provider Orders: OT eval and treat   Recommended Adaptive Equipment: shower chair     Diagnosis: constipation   Surgery: none   Pertinent Medical History: L1and L2 fxs, HTN, HLD, osteoporosis   Precautions:  Fall Risk, spinal, TLSO    Assessment of current deficits   [x] Functional mobility  [x]ADLs  [x] Strength               [x]Cognition   [x] Functional transfers   [x] IADLs         [x] Safety Awareness   [x]Endurance   [] Fine Coordination              [x] Balance      [] Vision/perception   [x]Sensation    []Gross Motor Coordination  [] ROM  [] Delirium                   [] Motor Control     OT PLAN OF CARE   OT POC based on physician orders, patient diagnosis and results of clinical assessment    Frequency/Duration: 2-4 days/wk for 2 weeks PRN   Specific OT Treatment to include:   * Instruction/training on adapted ADL techniques and AE recommendations to increase functional independence within precautions       * Training on energy conservation strategies, correct breathing pattern and techniques to improve independence/tolerance for self-care routine  * Functional transfer/mobility training/DME recommendations for increased independence, safety, and fall prevention  * Patient/Family education to increase follow through with safety techniques and functional independence  * Recommendation of environmental modifications for increased safety with functional transfers/mobility and ADLs  * Therapeutic exercise to improve motor endurance, ROM, and functional strength for ADLs/functional transfers  * Therapeutic activities to facilitate/challenge dynamic balance, stand tolerance for increased safety and independence with ADLs  * Neuro-muscular re-education: facilitation of righting/equilibrium reactions, midline orientation, scapular stability/mobility, normalization of muscle tone, and facilitation of volitional active controled movement    Home Living: Pt lives with  in a 1 story home; bed/bath on main level   Bathroom setup: tub shower unit   Equipment owned: ww  Prior Level of Function: assist with ADLs/IADLs; using cane for functional mobility     Pain Level: back  Cognition: A&O: 3/4; Follows 1 step directions, with repetition and increased time   Memory:  good    Sequencing:  good    Problem solving:  fair    Judgement/safety:  fair     Functional Assessment:  AM-PAC Daily Activity Raw Score: 19/24   Initial Eval Status  Date: 12/5/22 Treatment Status  Date: STGs=LTGs  Time Frame: 10-14 days   Feeding IND (pt able to open packages and self feed)      Grooming SBA (standing at sink)   IND   UB Dressing Min A (assist with TLSO)   SBA   LB Dressing SBA (pt donned pants and socks bed level)  IND    Bathing SBA (simulated)  SUP    Toileting SBA (pt able to complete hygiene and pants management)  IND   Bed Mobility  Log roll: SBA  Supine to sit: SBA   Sit to supine: SBA   Log roll IND  Supine to sit: IND   Sit to supine: IND   Functional Transfers Sit to stand:SBA   Stand to sit:SBA  Commode: SBA  IND   Functional Mobility SBA (using SPC, to/from bathroom)  ind   Balance Sitting: SBA  Standing: SBA     Activity Tolerance fair     Visual/  Perceptual Glasses: yes             UE ROM: BUE: elbow flex WFL, shoulder flex grossly 90'  Strength: RUE: grossly 3/5 LUE: grossly 3/5   Strength: B WFL  Fine Motor Coordination:  WFL     Hearing: WFL  Sensation:  No c/o numbness/tingling   Tone:  WFL  Edema: none noted                            Comments:Cleared by RN to see pt.  Upon arrival, patient supine in bed and agreeable to OT session. At end of session, patient supine in bed with call light and phone within reach, all lines and tubes intact. Pt would benefit from continued OT to increase functional independence and quality of life. Treatment:  Pt required vc's and physical assist for proper technique/safety with hand placement/body mechanics/posture for bed mobility/ADLs/functional tranfers/mobility/cane management. Pt required vc's for sequencing/initiation of ADLs/functional transfers. Pt able to verbalize spinal precautions. Pt required rest breaks during session. Pt appeared to have tolerated session well and appears motivated/cooperative/pleasant . Pt instructed on use of call light for assistance and fall prevention. Pt demo'ing fair understanding of education provided. Continue to educate. Eval Complexity: Low    Rehab Potential: Good for established goals, pt. assisted in establishment of goals. LTG: maximize independence with ADLs to return to PLOF    Patient and/or family were instructed on diagnosis, prognosis/goals and plan of care. Demonstrated fair understanding. [] Malnutrition indicators have been identified and nursing has been notified to ensure a dietitian consult is ordered. Evaluation time includes thorough review of current medical information, gathering information on past medical & social history & PLOF, completion of standardized testing, informal observation of tasks, consultation with other medical professions/disciplines, assessment of data & development of POC/goals.      Time In: 0805       Time Out: 0820     Total treatment time: 0       Treatment Charges: Mins Units   OT Eval Low 21561 X    OT Eval Medium 97897     OT Eval High 12340     OT Re-Eval [id number]     Ther Ex  91853       Manual Therapy 66250       Thera Activities 09483       ADL/Home Mgt 21041     Neuro Re-ed 77163       Group Therapy        Orthotic manage/training  74625       Non-Billable Time           Amy Benitez Herminio Zepeda, OTR/L [id number]

## 2022-12-05 NOTE — PATIENT CARE CONFERENCE
P Quality Flow/Interdisciplinary Rounds Progress Note        Quality Flow Rounds held on December 5, 2022    Disciplines Attending:  Bedside Nurse, , , and Nursing Unit Leadership    Savita Cartwright was admitted on 12/3/2022  5:15 PM    Anticipated Discharge Date:       Disposition:    Maxx Score:  Maxx Scale Score: 20    Readmission Risk              Risk of Unplanned Readmission:  13           Discussed patient goal for the day, patient clinical progression, and barriers to discharge.   The following Goal(s) of the Day/Commitment(s) have been identified:  Discharge - Obtain Order      Chris Humphreys RN  December 5, 2022

## 2022-12-05 NOTE — PROGRESS NOTES
Physical Therapy Rx note    Attending Provider:  Huong Jimenez MD    Evaluating PT:  Sharon Feeling PT, RRT, CEAS    Room #:  0224/5935-X  Diagnosis:  No Dx provided in note  Pertinent PMHx/PSHx:  L-comp Fx with progressing L2 Fx/MRI-CT scan, Given TLSO 2/2 L-comp Fx's Nov 24, 2022  Procedure/Surgery:  NA  Precautions:  Lumbar vertebral comp Fx's, TLSO use, general,   Equipment Needs:  TLSO, SC    Attempted to see pt this am pt declined. Pt reports she is going home, no concerns. Reviewed spinal precautions.     Heaven Miller, [address]

## 2022-12-05 NOTE — PLAN OF CARE
Problem: Pain  Goal: Verbalizes/displays adequate comfort level or baseline comfort level  12/5/2022 1043 by Kal Brantley RN  Outcome: Progressing     Problem: Safety - Adult  Goal: Free from fall injury  12/5/2022 1043 by Kal Brantley RN  Outcome: Progressing     Problem: ABCDS Injury Assessment  Goal: Absence of physical injury  12/5/2022 1043 by Kal Brantley RN  Outcome: Progressing

## 2022-12-08 NOTE — DISCHARGE SUMMARY
[facility]   Discharge summary   Patient ID:  Savita Cartwright  [id number]  67 y.o.  1950    Admit date: 12/3/2022    Discharge date and time: 12/5/2022    Admission Diagnoses:   Patient Active Problem List   Diagnosis    Lumbar compression fracture, closed, initial encounter ([facility]    Constipation    Unable to care for self       Discharge Diagnoses: Constipation, Recent compression fracture    Consults: none    Procedures: None    Hospital Course:  The patient is a 67 y.o. female of Marychuy Pagan MD     Patient is a 71-year-old female admitted to [place] [address] for  Abdominal pain associated with constipation  -Monitor labs  -Dulcolax 10 mg suppository given, minimal results  -Daily twice daily Colace  -Fleets enema x1, as needed enema, mag citrate  -IVF Vidal@ViS  -Lactulose if still no results with above     Chest pain described as epigastric pain  -Worsened with eating  -22-19-24-26  -EKG with on determined age septal infarct, ST  -Add PPI     History of recent compression fracture of the L-spine  -TLSO brace  -CT abdomen revealing new compression fracture involving the L2 although noted on MRI from 11/21 but states the L2 fracture has progressed.  -She has a follow-up appointment with Dr. David Galloway on Tuesday which she would like to keep  -PT and OT  -There is no [facility] available at [facility]     12/5/22:  -Multiple BM's documented, continue Colace BID   -Continue PPI for epigastric pain, will order for discharge  -can likely discharge home as PT [facility] 19/24  -Okay for discharge from medicine standpoint today    Recent Labs     12/05/22  0305   WBC 4.9   HGB 12.6   HCT 39.5          Recent Labs     12/05/22  0305      K 3.8      CO2 23   BUN 7   CREATININE 0.6   CALCIUM 8.7       CT ABDOMEN PELVIS W IV CONTRAST Additional Contrast? None    Result Date: 12/3/2022  EXAMINATION: CT OF THE ABDOMEN AND PELVIS WITH CONTRAST 12/3/2022 8:52 pm TECHNIQUE: CT of the abdomen and pelvis was performed with the administration of intravenous contrast. Multiplanar reformatted images are provided for review. Automated exposure control, iterative reconstruction, and/or weight based adjustment of the mA/kV was utilized to reduce the radiation dose to as low as reasonably achievable. COMPARISON: November 9, 2022 HISTORY: ORDERING SYSTEM PROVIDED HISTORY: abd pain TECHNOLOGIST PROVIDED HISTORY: Reason for exam:->abd pain Additional Contrast?->None Decision Support Exception - unselect if not a suspected or confirmed emergency medical condition->Emergency Medical Condition (MA) FINDINGS: No bowel obstruction, free air, or free fluid. Moderate amount of stool throughout the colon. The appendix is not definitively visualized, however no focal inflammatory changes in its expected location. Liver is unremarkable. Gallbladder is unremarkable. No evidence of acute pancreatitis. Partial fatty replacement of the pancreas. Spleen is unremarkable. Adrenal glands are normal.  No hydronephrosis or perinephric edema. No retroperitoneal lymphadenopathy. Urinary bladder is unremarkable. No pneumonia in the lung bases. Depression involving superior endplate of F60. Stable fracture involving T12 with sclerosis. Redemonstration of fracture involving L1 with loss of height of approximately 20% and sclerosis. New fracture involving L2 vertebral body with retropulsion and mild central canal stenosis. Stable fracture involving inferior endplate of L3.     1. New compression fracture involving L2 with loss of height of approximately 30% and mild retropulsion. 2. Stable fractures involving T11, T12, L1, and inferior endplate of L3. 3. Moderate amount of stool throughout the colon. 4. No bowel obstruction, free air, or focal inflammatory changes in the abdomen or pelvis.      XR CHEST PORTABLE    Result Date: 12/3/2022  EXAMINATION: ONE XRAY VIEW OF THE CHEST 12/3/2022 6:29 pm COMPARISON: 11/14/2015 HISTORY: ORDERING SYSTEM PROVIDED HISTORY: Chest Pain TECHNOLOGIST PROVIDED HISTORY: Reason for exam:->Chest Pain FINDINGS: The lungs are without acute focal process. There is no effusion or pneumothorax. The cardiomediastinal silhouette is without acute process. The osseous structures are without acute process. No acute process. Discharge Exam:    HEENT: NCAT,  PERRLA, No JVD  Heart:  RRR, no murmurs, gallops, or rubs. Lungs:  CTA bilaterally, no wheeze, rales or rhonchi  Abd: bowel sounds present, nontender, nondistended, no masses  Extrem:  No clubbing, cyanosis, or edema    Disposition: home     Patient Condition at Discharge: stable    Patient Instructions:      Medication List        START taking these medications      pantoprazole 40 MG tablet  Commonly known as: PROTONIX  Take 1 tablet by mouth every morning (before breakfast)            CONTINUE taking these medications      amLODIPine 5 MG tablet  Commonly known as: NORVASC     docusate 100 MG Caps  Commonly known as: COLACE, DULCOLAX  Take 100 mg by mouth 2 times daily     VITAMIN D PO               Where to Get Your Medications        These medications were sent to [address] [place] [address] [phone]  [address] [place] [address]      Phone: [phone]   pantoprazole 40 MG tablet       Activity: activity as tolerated  Diet: cardiac diet    Pt has been advised to: Follow-up with Mani Miller MD in 1 week.   Follow-up with consultants as recommended by them    Note that over 30 minutes was spent in preparing discharge papers, discussing discharge with patient, medication review, etc.    Signed:  Gaurang Woods MD  12/5/2022

## 2022-12-10 ENCOUNTER — HOSPITAL ENCOUNTER (INPATIENT)
Age: 72
LOS: 6 days | Discharge: HOME | DRG: 384 | End: 2022-12-16
Attending: EMERGENCY MEDICINE | Admitting: INTERNAL MEDICINE
Payer: MEDICARE

## 2022-12-10 ENCOUNTER — APPOINTMENT (OUTPATIENT)
Dept: CT IMAGING | Age: 72
DRG: 384 | End: 2022-12-10
Payer: MEDICARE

## 2022-12-10 DIAGNOSIS — R13.10 DYSPHAGIA, UNSPECIFIED TYPE: ICD-10-CM

## 2022-12-10 DIAGNOSIS — S22.000A COMPRESSION FRACTURE OF THORACIC VERTEBRA, UNSPECIFIED THORACIC VERTEBRAL LEVEL, INITIAL ENCOUNTER (HCC): Primary | ICD-10-CM

## 2022-12-10 DIAGNOSIS — S32.000A LUMBAR COMPRESSION FRACTURE, CLOSED, INITIAL ENCOUNTER (HCC): ICD-10-CM

## 2022-12-10 DIAGNOSIS — R13.10 ODYNOPHAGIA: ICD-10-CM

## 2022-12-10 LAB
ALBUMIN SERPL-MCNC: 3.8 G/DL (ref 3.5–5.2)
ALP BLD-CCNC: 146 U/L (ref 35–104)
ALT SERPL-CCNC: 15 U/L (ref 0–32)
ANION GAP SERPL CALCULATED.3IONS-SCNC: 18 MMOL/L (ref 7–16)
ANISOCYTOSIS: ABNORMAL
AST SERPL-CCNC: 22 U/L (ref 0–31)
BASOPHILS ABSOLUTE: 0.05 E9/L (ref 0–0.2)
BASOPHILS RELATIVE PERCENT: 0.7 % (ref 0–2)
BILIRUB SERPL-MCNC: 0.3 MG/DL (ref 0–1.2)
BILIRUBIN URINE: ABNORMAL
BLOOD, URINE: NEGATIVE
BUN BLDV-MCNC: 10 MG/DL (ref 6–23)
CALCIUM SERPL-MCNC: 9.9 MG/DL (ref 8.6–10.2)
CHLORIDE BLD-SCNC: 98 MMOL/L (ref 98–107)
CLARITY: CLEAR
CO2: 22 MMOL/L (ref 22–29)
COLOR: YELLOW
CREAT SERPL-MCNC: 0.6 MG/DL (ref 0.5–1)
EOSINOPHILS ABSOLUTE: 0.04 E9/L (ref 0.05–0.5)
EOSINOPHILS RELATIVE PERCENT: 0.6 % (ref 0–6)
GFR SERPL CREATININE-BSD FRML MDRD: >60 ML/MIN/1.73
GLUCOSE BLD-MCNC: 85 MG/DL (ref 74–99)
GLUCOSE URINE: NEGATIVE MG/DL
HCT VFR BLD CALC: 47.9 % (ref 34–48)
HEMOGLOBIN: 15.6 G/DL (ref 11.5–15.5)
IMMATURE GRANULOCYTES #: 0.04 E9/L
IMMATURE GRANULOCYTES %: 0.6 % (ref 0–5)
KETONES, URINE: 40 MG/DL
LACTIC ACID: 1.7 MMOL/L (ref 0.5–2.2)
LEUKOCYTE ESTERASE, URINE: NEGATIVE
LIPASE: 20 U/L (ref 13–60)
LYMPHOCYTES ABSOLUTE: 0.59 E9/L (ref 1.5–4)
LYMPHOCYTES RELATIVE PERCENT: 8.3 % (ref 20–42)
MCH RBC QN AUTO: 29.9 PG (ref 26–35)
MCHC RBC AUTO-ENTMCNC: 32.6 % (ref 32–34.5)
MCV RBC AUTO: 91.8 FL (ref 80–99.9)
MONOCYTES ABSOLUTE: 0.55 E9/L (ref 0.1–0.95)
MONOCYTES RELATIVE PERCENT: 7.7 % (ref 2–12)
NEUTROPHILS ABSOLUTE: 5.87 E9/L (ref 1.8–7.3)
NEUTROPHILS RELATIVE PERCENT: 82.1 % (ref 43–80)
NITRITE, URINE: NEGATIVE
OVALOCYTES: ABNORMAL
PDW BLD-RTO: 15.6 FL (ref 11.5–15)
PH UA: 5 (ref 5–9)
PLATELET # BLD: 418 E9/L (ref 130–450)
PMV BLD AUTO: 9.2 FL (ref 7–12)
POIKILOCYTES: ABNORMAL
POTASSIUM SERPL-SCNC: 4.3 MMOL/L (ref 3.5–5)
PROTEIN UA: NEGATIVE MG/DL
RBC # BLD: 5.22 E12/L (ref 3.5–5.5)
SODIUM BLD-SCNC: 138 MMOL/L (ref 132–146)
SPECIFIC GRAVITY UA: >=1.03 (ref 1–1.03)
TOTAL PROTEIN: 7.2 G/DL (ref 6.4–8.3)
UROBILINOGEN, URINE: 0.2 E.U./DL
WBC # BLD: 7.1 E9/L (ref 4.5–11.5)

## 2022-12-10 PROCEDURE — 1200000000 HC SEMI PRIVATE

## 2022-12-10 PROCEDURE — 72131 CT LUMBAR SPINE W/O DYE: CPT

## 2022-12-10 PROCEDURE — 83605 ASSAY OF LACTIC ACID: CPT

## 2022-12-10 PROCEDURE — 99285 EMERGENCY DEPT VISIT HI MDM: CPT

## 2022-12-10 PROCEDURE — 2580000003 HC RX 258: Performed by: EMERGENCY MEDICINE

## 2022-12-10 PROCEDURE — 85025 COMPLETE CBC W/AUTO DIFF WBC: CPT

## 2022-12-10 PROCEDURE — 6370000000 HC RX 637 (ALT 250 FOR IP): Performed by: EMERGENCY MEDICINE

## 2022-12-10 PROCEDURE — 81003 URINALYSIS AUTO W/O SCOPE: CPT

## 2022-12-10 PROCEDURE — 72128 CT CHEST SPINE W/O DYE: CPT

## 2022-12-10 PROCEDURE — 83690 ASSAY OF LIPASE: CPT

## 2022-12-10 PROCEDURE — 80053 COMPREHEN METABOLIC PANEL: CPT

## 2022-12-10 RX ORDER — SODIUM CHLORIDE 9 MG/ML
INJECTION, SOLUTION INTRAVENOUS CONTINUOUS
Status: DISCONTINUED | OUTPATIENT
Start: 2022-12-10 | End: 2022-12-14

## 2022-12-10 RX ORDER — SUCRALFATE 1 G/1
1 TABLET ORAL ONCE
Status: COMPLETED | OUTPATIENT
Start: 2022-12-10 | End: 2022-12-10

## 2022-12-10 RX ORDER — 0.9 % SODIUM CHLORIDE 0.9 %
500 INTRAVENOUS SOLUTION INTRAVENOUS ONCE
Status: COMPLETED | OUTPATIENT
Start: 2022-12-10 | End: 2022-12-10

## 2022-12-10 RX ADMIN — SODIUM CHLORIDE: 9 INJECTION, SOLUTION INTRAVENOUS at 17:52

## 2022-12-10 RX ADMIN — SODIUM CHLORIDE 500 ML: 9 INJECTION, SOLUTION INTRAVENOUS at 16:20

## 2022-12-10 RX ADMIN — SUCRALFATE 1 G: 1 TABLET ORAL at 17:49

## 2022-12-10 NOTE — ED PROVIDER NOTES
HPI:  12/10/22,   Time: 3:33 PM KIM Leal is a 67 y.o. female presenting to the ED for back pain dysphagia and inability to swallow constipation, beginning 2 days ago. The complaint has been persistent, severe in severity, and worsened by changing position. Patient was recently hospitalized with compression fractures and states that she went home and feels as though she broke another bone in her back. Patient states she is unable to swallow more than 2 or 3 sips of water at a time and is unable to defecate. She states she had her last bowel movement 5 days ago. Patient is supposed to see Dr. Jessenia Gatica for her difficulty swallowing however states that it is gotten progressively worse and she is barely able to tolerate fluids and is completely unable to tolerate her meds at this point. Patient has lost 25 pounds since 11/14    ROS:   Pertinent positives and negatives are stated within HPI, all other systems reviewed and are negative.  --------------------------------------------- PAST HISTORY ---------------------------------------------  Past Medical History:  has a past medical history of Bruising tendency, Hyperlipidemia, Hypertension, and Osteoporosis. Past Surgical History:  has no past surgical history on file. Social History:  reports that she has never smoked. She has never used smokeless tobacco. She reports that she does not drink alcohol and does not use drugs. Family History: family history includes Arthritis in her mother; Heart Disease in her father; Lupus in her mother; Stroke in her father. The patients home medications have been reviewed.     Allergies: Latex    -------------------------------------------------- RESULTS -------------------------------------------------  All laboratory and radiology results have been personally reviewed by myself   LABS:  Results for orders placed or performed during the hospital encounter of 12/10/22   Lipase   Result Value Ref Range    Lipase 20 13 - 60 U/L   Lactic Acid   Result Value Ref Range    Lactic Acid 1.7 0.5 - 2.2 mmol/L   CBC with Auto Differential   Result Value Ref Range    WBC 7.1 4.5 - 11.5 E9/L    RBC 5.22 3.50 - 5.50 E12/L    Hemoglobin 15.6 (H) 11.5 - 15.5 g/dL    Hematocrit 47.9 34.0 - 48.0 %    MCV 91.8 80.0 - 99.9 fL    MCH 29.9 26.0 - 35.0 pg    MCHC 32.6 32.0 - 34.5 %    RDW 15.6 (H) 11.5 - 15.0 fL    Platelets 131 456 - 680 E9/L    MPV 9.2 7.0 - 12.0 fL    Neutrophils % 82.1 (H) 43.0 - 80.0 %    Immature Granulocytes % 0.6 0.0 - 5.0 %    Lymphocytes % 8.3 (L) 20.0 - 42.0 %    Monocytes % 7.7 2.0 - 12.0 %    Eosinophils % 0.6 0.0 - 6.0 %    Basophils % 0.7 0.0 - 2.0 %    Neutrophils Absolute 5.87 1.80 - 7.30 E9/L    Immature Granulocytes # 0.04 E9/L    Lymphocytes Absolute 0.59 (L) 1.50 - 4.00 E9/L    Monocytes Absolute 0.55 0.10 - 0.95 E9/L    Eosinophils Absolute 0.04 (L) 0.05 - 0.50 E9/L    Basophils Absolute 0.05 0.00 - 0.20 E9/L    Anisocytosis 1+     Poikilocytes 1+     Ovalocytes 1+    CMP   Result Value Ref Range    Sodium 138 132 - 146 mmol/L    Potassium 4.3 3.5 - 5.0 mmol/L    Chloride 98 98 - 107 mmol/L    CO2 22 22 - 29 mmol/L    Anion Gap 18 (H) 7 - 16 mmol/L    Glucose 85 74 - 99 mg/dL    BUN 10 6 - 23 mg/dL    Creatinine 0.6 0.5 - 1.0 mg/dL    Est, Glom Filt Rate >60 >=60 mL/min/1.73    Calcium 9.9 8.6 - 10.2 mg/dL    Total Protein 7.2 6.4 - 8.3 g/dL    Albumin 3.8 3.5 - 5.2 g/dL    Total Bilirubin 0.3 0.0 - 1.2 mg/dL    Alkaline Phosphatase 146 (H) 35 - 104 U/L    ALT 15 0 - 32 U/L    AST 22 0 - 31 U/L       RADIOLOGY:  Interpreted by Radiologist.  Nicolas   Final Result   Extensive compression fractures of the thoracic spine as noted with kyphosis. Atelectasis in the lung bases. CT lumbar spine.       Comparison 11/09/2022      Findings      There is significant compression deformity of L1, L2 and L3 with loss of   height ranging from but 30-50% with progressive compression of L2. There is   mild retropulsion of the bony fragments at L1  ,L2. There is osteopenia and   degenerative changes in the lumbar spine. Impression      Diffuse compression fractures of L1 ,L2 and L3 as noted with progressive   compression of L2. There is retropulsion of the bony fragments at L1 and L2.      RECOMMENDATIONS:   Unavailable         CT THORACIC SPINE WO CONTRAST   Final Result   Extensive compression fractures of the thoracic spine as noted with kyphosis. Atelectasis in the lung bases. CT lumbar spine. Comparison 11/09/2022      Findings      There is significant compression deformity of L1, L2 and L3 with loss of   height ranging from but 30-50% with progressive compression of L2. There is   mild retropulsion of the bony fragments at L1  ,L2. There is osteopenia and   degenerative changes in the lumbar spine. Impression      Diffuse compression fractures of L1 ,L2 and L3 as noted with progressive   compression of L2. There is retropulsion of the bony fragments at L1 and L2.      RECOMMENDATIONS:   Unavailable           Multiple compression fractures are identified in the thoracic spine which   involves but T6, T8, T9, T11 and T12 with nearly 30-50% loss of height.     ------------------------- NURSING NOTES AND VITALS REVIEWED ---------------------------   The nursing notes within the ED encounter and vital signs as below have been reviewed.    BP (!) 130/92   Pulse (!) 120   Temp 98.2 °F (36.8 °C) (Oral)   Resp 16   Ht 5' 4\" (1.626 m)   Wt 115 lb (52.2 kg)   SpO2 95%   BMI 19.74 kg/m²   Oxygen Saturation Interpretation: Normal      ---------------------------------------------------PHYSICAL EXAM--------------------------------------      Constitutional/General: Alert and oriented x3, well appearing, non toxic in NAD  Head: NC/AT  Eyes: PERRL, EOMI  Mouth: Oropharynx clear, handling secretions, no trismus  Neck: Supple, full ROM, no meningeal signs  Pulmonary: Lungs clear to auscultation bilaterally, no wheezes, rales, or rhonchi. Not in respiratory distress  Cardiovascular:  Regular tachycardic rate and rhythm, no murmurs, gallops, or rubs. 2+ distal pulses  Abdomen: Soft, non tender, non distended,   Extremities: Moves all extremities x 4. Warm and well perfused  Skin: warm and dry without rash  Neurologic: GCS 15, plus strength bilateral lower extremities with normal sensation cranial nerves are intact  Psych: Normal Affect      ------------------------------ ED COURSE/MEDICAL DECISION MAKING----------------------  Medications   sucralfate (CARAFATE) tablet 1 g (has no administration in time range)   0.9 % sodium chloride bolus (has no administration in time range)   0.9 % sodium chloride infusion (has no administration in time range)         Medical Decision Making:    Patient states she does not need medication for pain at the current time while she is still. She states that if she moves around her pain is worse. Patient states that she is unable to swallow and unable to tolerate pills. She was unable to tolerate her medications today. Patient's family states she saw Dr. Manolo Cummings and is supposed to see Dr. Stevie Miranda. Patient is having progressive increasing discomfort and decline. Patient was discussed with Dr. Manolo Cummings who feels there is no need for acute intervention for her back. Patient was discussed with Dr. Corrie Davila who was covering for Dr. Cecile Ngo and admitted to a medical floor bed    Counseling: The emergency provider has spoken with the patient and discussed todays results, in addition to providing specific details for the plan of care and counseling regarding the diagnosis and prognosis. Questions are answered at this time and they are agreeable with the plan.      --------------------------------- IMPRESSION AND DISPOSITION ---------------------------------    IMPRESSION  1.  Compression fracture of thoracic vertebra, unspecified thoracic vertebral level, initial encounter ([facility] [address]    2. Lumbar compression fracture, closed, initial encounter ([facility] [address]    3. Odynophagia    4.  Dysphagia, unspecified type        DISPOSITION  Disposition: Admit to Med/ Surg  Patient condition is serious                  Funmi Lozano MD  12/10/22 2193

## 2022-12-11 PROCEDURE — 2580000003 HC RX 258: Performed by: INTERNAL MEDICINE

## 2022-12-11 PROCEDURE — 6370000000 HC RX 637 (ALT 250 FOR IP)

## 2022-12-11 PROCEDURE — 2580000003 HC RX 258: Performed by: EMERGENCY MEDICINE

## 2022-12-11 PROCEDURE — 6370000000 HC RX 637 (ALT 250 FOR IP): Performed by: INTERNAL MEDICINE

## 2022-12-11 PROCEDURE — 1200000000 HC SEMI PRIVATE

## 2022-12-11 PROCEDURE — 6360000002 HC RX W HCPCS: Performed by: INTERNAL MEDICINE

## 2022-12-11 RX ORDER — ENOXAPARIN SODIUM 100 MG/ML
40 INJECTION SUBCUTANEOUS DAILY
Status: DISCONTINUED | OUTPATIENT
Start: 2022-12-11 | End: 2022-12-16 | Stop reason: HOSPADM

## 2022-12-11 RX ORDER — SENNA PLUS 8.6 MG/1
1 TABLET ORAL NIGHTLY
Status: DISCONTINUED | OUTPATIENT
Start: 2022-12-11 | End: 2022-12-16 | Stop reason: HOSPADM

## 2022-12-11 RX ORDER — ACETAMINOPHEN 325 MG/1
650 TABLET ORAL EVERY 6 HOURS PRN
Status: DISCONTINUED | OUTPATIENT
Start: 2022-12-11 | End: 2022-12-16 | Stop reason: HOSPADM

## 2022-12-11 RX ORDER — SODIUM CHLORIDE 0.9 % (FLUSH) 0.9 %
5-40 SYRINGE (ML) INJECTION PRN
Status: DISCONTINUED | OUTPATIENT
Start: 2022-12-11 | End: 2022-12-16 | Stop reason: HOSPADM

## 2022-12-11 RX ORDER — POLYETHYLENE GLYCOL 3350 17 G/17G
17 POWDER, FOR SOLUTION ORAL DAILY PRN
Status: DISCONTINUED | OUTPATIENT
Start: 2022-12-11 | End: 2022-12-11

## 2022-12-11 RX ORDER — SODIUM CHLORIDE 0.9 % (FLUSH) 0.9 %
5-40 SYRINGE (ML) INJECTION EVERY 12 HOURS SCHEDULED
Status: DISCONTINUED | OUTPATIENT
Start: 2022-12-11 | End: 2022-12-16 | Stop reason: HOSPADM

## 2022-12-11 RX ORDER — POLYETHYLENE GLYCOL 3350 17 G/17G
17 POWDER, FOR SOLUTION ORAL DAILY
Status: DISCONTINUED | OUTPATIENT
Start: 2022-12-11 | End: 2022-12-16 | Stop reason: HOSPADM

## 2022-12-11 RX ORDER — DOCUSATE SODIUM 100 MG/1
100 CAPSULE, LIQUID FILLED ORAL 2 TIMES DAILY
Status: DISCONTINUED | OUTPATIENT
Start: 2022-12-11 | End: 2022-12-16 | Stop reason: HOSPADM

## 2022-12-11 RX ORDER — ONDANSETRON 4 MG/1
4 TABLET, ORALLY DISINTEGRATING ORAL EVERY 8 HOURS PRN
Status: DISCONTINUED | OUTPATIENT
Start: 2022-12-11 | End: 2022-12-16 | Stop reason: HOSPADM

## 2022-12-11 RX ORDER — SODIUM CHLORIDE 9 MG/ML
INJECTION, SOLUTION INTRAVENOUS PRN
Status: DISCONTINUED | OUTPATIENT
Start: 2022-12-11 | End: 2022-12-16 | Stop reason: HOSPADM

## 2022-12-11 RX ORDER — LACTULOSE 10 G/15ML
20 SOLUTION ORAL ONCE
Status: COMPLETED | OUTPATIENT
Start: 2022-12-11 | End: 2022-12-11

## 2022-12-11 RX ORDER — PANTOPRAZOLE SODIUM 40 MG/1
40 TABLET, DELAYED RELEASE ORAL
Status: DISCONTINUED | OUTPATIENT
Start: 2022-12-12 | End: 2022-12-14

## 2022-12-11 RX ORDER — ACETAMINOPHEN 650 MG/1
650 SUPPOSITORY RECTAL EVERY 6 HOURS PRN
Status: DISCONTINUED | OUTPATIENT
Start: 2022-12-11 | End: 2022-12-16 | Stop reason: HOSPADM

## 2022-12-11 RX ORDER — ONDANSETRON 2 MG/ML
4 INJECTION INTRAMUSCULAR; INTRAVENOUS EVERY 6 HOURS PRN
Status: DISCONTINUED | OUTPATIENT
Start: 2022-12-11 | End: 2022-12-16 | Stop reason: HOSPADM

## 2022-12-11 RX ORDER — AMLODIPINE BESYLATE 5 MG/1
5 TABLET ORAL DAILY
Status: DISCONTINUED | OUTPATIENT
Start: 2022-12-11 | End: 2022-12-16 | Stop reason: HOSPADM

## 2022-12-11 RX ADMIN — ENOXAPARIN SODIUM 40 MG: 100 INJECTION SUBCUTANEOUS at 10:04

## 2022-12-11 RX ADMIN — POLYETHYLENE GLYCOL 3350 17 G: 17 POWDER, FOR SOLUTION ORAL at 15:58

## 2022-12-11 RX ADMIN — LACTULOSE 20 G: 20 SOLUTION ORAL at 15:58

## 2022-12-11 RX ADMIN — DOCUSATE SODIUM 100 MG: 100 CAPSULE, LIQUID FILLED ORAL at 20:40

## 2022-12-11 RX ADMIN — DOCUSATE SODIUM 100 MG: 100 CAPSULE, LIQUID FILLED ORAL at 10:05

## 2022-12-11 RX ADMIN — SODIUM CHLORIDE, PRESERVATIVE FREE 10 ML: 5 INJECTION INTRAVENOUS at 10:05

## 2022-12-11 RX ADMIN — ACETAMINOPHEN 650 MG: 325 TABLET ORAL at 18:52

## 2022-12-11 RX ADMIN — SODIUM CHLORIDE: 9 INJECTION, SOLUTION INTRAVENOUS at 04:42

## 2022-12-11 RX ADMIN — SENNOSIDES 8.6 MG: 8.6 TABLET, FILM COATED ORAL at 20:40

## 2022-12-11 ASSESSMENT — PAIN SCALES - GENERAL
PAINLEVEL_OUTOF10: 6
PAINLEVEL_OUTOF10: 0

## 2022-12-11 NOTE — CONSULTS
Neurosurgery Consult Note      CHIEF COMPLAINT: LBP    HPI: Patient was seen in the office on 12/7/2022 for evaluation of low back pain she has numerous compression fractures throughout the thoracic spine and lumbar spine recommendation at that time was a TLSO brace for 90 days and outpatient rehab she was also to see a gastroenterologist to evaluate her weight loss and bowel difficulties    Past Medical History:   Diagnosis Date    Bruising tendency     Hyperlipidemia     Hypertension     Osteoporosis      History reviewed. No pertinent surgical history. Latex  Prior to Admission medications    Medication Sig Start Date End Date Taking? Authorizing Provider   pantoprazole (PROTONIX) 40 MG tablet Take 1 tablet by mouth every morning (before breakfast) 12/5/22   Suzette Gowers, APRN - CNP   docusate sodium (COLACE, DULCOLAX) 100 MG CAPS Take 100 mg by mouth 2 times daily 11/24/22   Grant Mata DO   VITAMIN D PO Take 1 Dose by mouth every other day    Historical Provider, MD   amLODIPine (NORVASC) 5 MG tablet Take 5 mg by mouth daily    Historical Provider, MD     No outpatient medications have been marked as taking for the 12/10/22 encounter [facility] Encounter).      Social History     Socioeconomic History    Marital status:      Spouse name: Not on file    Number of children: Not on file    Years of education: Not on file    Highest education level: Not on file   Occupational History    Not on file   Tobacco Use    Smoking status: Never    Smokeless tobacco: Never   Vaping Use    Vaping Use: Never used   Substance and Sexual Activity    Alcohol use: Never    Drug use: Never    Sexual activity: Not on file   Other Topics Concern    Not on file   Social History Narrative    Not on file     Social Determinants of Health     Financial Resource Strain: Not on file   Food Insecurity: Not on file   Transportation Needs: Not on file   Physical Activity: Not on file   Stress: Not on file   Social Connections: Not on file   Intimate Partner Violence: Not on file   Housing Stability: Not on file     Family History   Problem Relation Age of Onset    Arthritis Mother     Lupus Mother     Stroke Father     Heart Disease Father         ? heart attack not sure        ROS: Complete 10 point ROS was obtained with positives in HPI, otherwise:  Pt denies fevers, denies chills. Pt denies chest pain, denies SOB, denies nausea,  denies headache. VITALS/DRAINS:       EXAMINATION:  Cranial Nerves: Motor:  Sensory:  Cerebellar:  Gait:  DTRs:    IMAGING STUDIES:  CT THORACIC SPINE WO CONTRAST    Result Date: 12/10/2022  EXAMINATION: CT OF THE LUMBAR SPINE WITHOUT CONTRAST; CT OF THE THORACIC SPINE WITHOUT CONTRAST  12/10/2022 TECHNIQUE: CT of the lumbar spine was performed without the administration of intravenous contrast. Multiplanar reformatted images are provided for review. Adjustment of mA and/or kV according to patient size was utilized. Automated exposure control, iterative reconstruction, and/or weight based adjustment of the mA/kV was utilized to reduce the radiation dose to as low as reasonably achievable.; CT of the thoracic spine was performed without the administration of intravenous contrast. Multiplanar reformatted images are provided for review. Automated exposure control, iterative reconstruction, and/or weight based adjustment of the mA/kV was utilized to reduce the radiation dose to as low as reasonably achievable. COMPARISON: None HISTORY: ORDERING SYSTEM PROVIDED HISTORY: compression fracture TECHNOLOGIST PROVIDED HISTORY: Reason for exam:->compression fracture Decision Support Exception - unselect if not a suspected or confirmed emergency medical condition->Emergency Medical Condition (MA) What reading provider will be dictating this exam?->CRC FINDINGS: CT thoracic spine.  Multiple compression fractures are identified in the thoracic spine which involves but T6, T8, T9, T11 and T12 with nearly 30-50% loss of height. Compression fractures of L1-L2 and L3 are also noted. There is osteopenia and degenerative changes. The lung bases demonstrate atelectasis. There is coronary artery calcification. A     Extensive compression fractures of the thoracic spine as noted with kyphosis. Atelectasis in the lung bases. CT lumbar spine. Comparison 11/09/2022 Findings There is significant compression deformity of L1, L2 and L3 with loss of height ranging from but 30-50% with progressive compression of L2. There is mild retropulsion of the bony fragments at L1  ,L2. There is osteopenia and degenerative changes in the lumbar spine. Impression Diffuse compression fractures of L1 ,L2 and L3 as noted with progressive compression of L2. There is retropulsion of the bony fragments at L1 and L2. RECOMMENDATIONS: Unavailable     CT LUMBAR SPINE WO CONTRAST    Result Date: 12/10/2022  EXAMINATION: CT OF THE LUMBAR SPINE WITHOUT CONTRAST; CT OF THE THORACIC SPINE WITHOUT CONTRAST  12/10/2022 TECHNIQUE: CT of the lumbar spine was performed without the administration of intravenous contrast. Multiplanar reformatted images are provided for review. Adjustment of mA and/or kV according to patient size was utilized. Automated exposure control, iterative reconstruction, and/or weight based adjustment of the mA/kV was utilized to reduce the radiation dose to as low as reasonably achievable.; CT of the thoracic spine was performed without the administration of intravenous contrast. Multiplanar reformatted images are provided for review. Automated exposure control, iterative reconstruction, and/or weight based adjustment of the mA/kV was utilized to reduce the radiation dose to as low as reasonably achievable.  COMPARISON: None HISTORY: ORDERING SYSTEM PROVIDED HISTORY: compression fracture TECHNOLOGIST PROVIDED HISTORY: Reason for exam:->compression fracture Decision Support Exception - unselect if not a suspected or confirmed emergency medical condition->Emergency Medical Condition (MA) What reading provider will be dictating this exam?->CRC FINDINGS: CT thoracic spine. Multiple compression fractures are identified in the thoracic spine which involves but T6, T8, T9, T11 and T12 with nearly 30-50% loss of height. Compression fractures of L1-L2 and L3 are also noted. There is osteopenia and degenerative changes. The lung bases demonstrate atelectasis. There is coronary artery calcification. A     Extensive compression fractures of the thoracic spine as noted with kyphosis. Atelectasis in the lung bases. CT lumbar spine. Comparison 11/09/2022 Findings There is significant compression deformity of L1, L2 and L3 with loss of height ranging from but 30-50% with progressive compression of L2. There is mild retropulsion of the bony fragments at L1  ,L2. There is osteopenia and degenerative changes in the lumbar spine. Impression Diffuse compression fractures of L1 ,L2 and L3 as noted with progressive compression of L2.   There is retropulsion of the bony fragments at L1 and L2. RECOMMENDATIONS: Unavailable       LABS:  CBC:   Lab Results   Component Value Date/Time    WBC 7.1 12/10/2022 01:34 PM    RBC 5.22 12/10/2022 01:34 PM    HGB 15.6 12/10/2022 01:34 PM    HCT 47.9 12/10/2022 01:34 PM    MCV 91.8 12/10/2022 01:34 PM    MCH 29.9 12/10/2022 01:34 PM    MCHC 32.6 12/10/2022 01:34 PM    RDW 15.6 12/10/2022 01:34 PM     12/10/2022 01:34 PM    MPV 9.2 12/10/2022 01:34 PM     BMP:    Lab Results   Component Value Date/Time     12/10/2022 01:34 PM    K 4.3 12/10/2022 01:34 PM    K 3.8 12/04/2022 04:30 AM    CL 98 12/10/2022 01:34 PM    CO2 22 12/10/2022 01:34 PM    BUN 10 12/10/2022 01:34 PM    LABALBU 3.8 12/10/2022 01:34 PM    CREATININE 0.6 12/10/2022 01:34 PM    CALCIUM 9.9 12/10/2022 01:34 PM    GFRAA >60 11/14/2015 04:10 AM    LABGLOM >60 12/10/2022 01:34 PM    GLUCOSE 85 12/10/2022 01:34 PM       IMPRESSION: Multiple lumbar and thoracic compression fractures    RECOMMENDATIONS: Conservative management recommended with a soft TLSO brace when out of bed possibly acute inpatient rehab when medically stable no neurosurgical intervention recommended at this time risks and benefits of kyphoplasty with discussed with her she prefers conservative management    Thank you again for this consultation.       Janes Dunlap MD  12/11/2022

## 2022-12-11 NOTE — CONSULTS
GENERAL SURGERY  CONSULT NOTE  12/11/2022    Physician Consulted: Dr. Luisana Richards  Reason for Consult: Dysphagia, abd pain, constipation    HPI  Sofía Khan is a 67 y.o. female who presented to the ED w back pain constipation and dysphagia. Pt has multiple compression fractures of the thoracic and lumbar spine and has been seen in the hospital and in Dr. Nguyen Richards for these complaints before. Pt also states that for one day prior to coming to the hospital she was unable to swallow very well, stating that her stomach felt full and that every time she tried to swallow something she would gag on it. When asked, the patient ever denies having symptoms of dysphagia before and states she is currently able to swallow without issue, although on chart review it seems that she was supposed to see a gastroenterologist to further evaluate her dysphagia. Patient said she ate scrambled eggs, sausage and a pancake for breakfast this morning and had no nausea, vomiting, dysphagia, or aspiration type symptoms. Patient does admit to 25lb+ weight loss over the past several months though, stating her appetite is diminished from what it was before. Additionally, patient states that she is chronically constipated and has intermittent crampy abdominal pain that she believes is related to her constipation. Patient admits to passing flatus daily, although says she has not had a BM in approximately 6 days. Pt denies previous surgeries and has never had colonoscopy or EGD performed. Denies blood thinner use. Vitals stable, afebrile    Pt did have CTAP performed 12/3 showing moderate stool burden throughout the colon. Past Medical History:   Diagnosis Date    Bruising tendency     Hyperlipidemia     Hypertension     Osteoporosis        History reviewed. No pertinent surgical history. Medications Prior to Admission    Prior to Admission medications    Medication Sig Start Date End Date Taking?  Authorizing Provider pantoprazole (PROTONIX) 40 MG tablet Take 1 tablet by mouth every morning (before breakfast) 12/5/22   Suzette Gowers, APRN - CNP   docusate sodium (COLACE, DULCOLAX) 100 MG CAPS Take 100 mg by mouth 2 times daily 11/24/22   Grant Mata DO   VITAMIN D PO Take 1 Dose by mouth every other day    Historical Provider, MD   amLODIPine (NORVASC) 5 MG tablet Take 5 mg by mouth daily    Historical Provider, MD       Allergies   Allergen Reactions    Latex        Family History   Problem Relation Age of Onset    Arthritis Mother     Lupus Mother     Stroke Father     Heart Disease Father         ? heart attack not sure        Social History     Tobacco Use    Smoking status: Never    Smokeless tobacco: Never   Vaping Use    Vaping Use: Never used   Substance Use Topics    Alcohol use: Never    Drug use: Never         Review of Systems: pertinent ROS listed in HPI, all others negative       PHYSICAL EXAM:    Vitals:    12/11/22 1009   BP: 139/66   Pulse: 98   Resp:    Temp:    SpO2:        GENERAL:  NAD. A&Ox3. Feeble appearing   HEAD:  Normocephalic. Atraumatic. EYES:   No scleral icterus. PERRL. LUNGS:  No increased work of breathing. CARDIOVASCULAR: RR  ABDOMEN:  Soft, non-distended, non-tender. No guarding, rigidity, rebound. EXTREMITIES:   MAEx4. Atraumatic. No LE edema. SKIN:  Warm and dry  NEUROLOGIC:  GCS 15       ASSESSMENT/PLAN:  67 y.o. female with constipation, recent weight loss, and recent dysphagia    SLP eval  Increase bowel regimen   With patient's dysphagia, recent significant weight loss, and lack of endoscopic hx we would like to offer EGD/Colonoscopy tentatively Tuesday 12/13. Plan discussed with Dr. Adarsh García.     Pedro Garner DO  Surgery Resident PGY-1  12/11/2022  2:21 PM

## 2022-12-11 NOTE — ED NOTES
Dr. Andrew Waldrop perfect served for diet order. Order obtained for soft foods diet. Order to be placed by this RN with verbal readback from Dr. Andrew Waldrop.       Eri Stephens RN  12/10/22 8435

## 2022-12-11 NOTE — H&P
[address]                  [address] [facility],  [facility]                              HISTORY AND PHYSICAL    PATIENT NAME: Radha Patel                  :        1950  MED REC NO:   [id number]                            ROOM:       5403  ACCOUNT NO:   [de-identified]                           ADMIT DATE: 12/10/2022  PROVIDER:     Jesus Drake DO    CHIEF COMPLAINT:  Abdominal pain. HISTORY OF PRESENT ILLNESS:  The patient is a 62-year-old   female who presents to the emergency room complaining of several-day  history of difficulty swallowing, epigastric abdominal pain,  constipation. She had recent hospitalization for vertebral compression  fracture. She wears a back brace. She is currently at home. She was  seen in the emergency room and admitted for further evaluation and  treatment. MEDICATIONS PRIOR TO ADMISSION:  Protonix, Colace, vitamin D,  amlodipine. PAST MEDICAL HISTORY:  Hypertension, vertebral compression fracture,  elevated cholesterol. PAST SURGICAL HISTORY:  Adkins teeth extraction. SOCIAL HISTORY:  No tobacco.  No alcohol. REVIEW OF SYSTEMS:  Remarkable for above-stated chief complaint. ALLERGIES:  To LATEX. PRIMARY CARE PHYSICIAN:  Ilsa London MD    PHYSICAL EXAMINATION:  GENERAL APPEARANCE:  Physical exam reveals a 62-year-old   female who is alert and oriented x3, cooperative and a good historian. VITAL SIGNS:  On admission temperature 92, pulse 120, respirations 16,  blood pressure 130/92. HEAD:  Normocephalic, atraumatic. EYES:  Pupils equal and reactive to light. Extraocular muscles intact. Fundi not well visualized. NOSE:  No obstruction, polyp or discharge noted. MOUTH:  Mucosa without lesion. PHARYNX:  Noninjected without exudate. NECK:  Supple. No JVD. No thyromegaly. No carotid bruits. HEART:  Regular rate and rhythm without murmur.   LUNGS:  Clear to auscultation bilaterally. ABDOMEN:  Positive bowel sounds. Soft. There is tenderness to  palpation in the epigastric region. No rebound or guarding. No  hepatosplenomegaly. No masses. BACK:  With increased thoracic kyphosis. EXTREMITIES:  Without edema. LYMPH NODES:  No adenopathy noted. IMPRESSION:  Epigastric abdominal pain, dysphagia, constipation, recent  lumbar compression fracture, hypertension. PLAN  Admit, IV hydration, dysphagia diet. Surgery to see. Continue  proton pump inhibitor. Discharge plan, home when stable.         Ramila Law DO    D: 12/11/2022 9:12:17       T: 12/11/2022 9:14:33     [id number]  Job#: [id number]     Doc#: [id number]    CC:

## 2022-12-12 PROCEDURE — 6370000000 HC RX 637 (ALT 250 FOR IP)

## 2022-12-12 PROCEDURE — 6360000002 HC RX W HCPCS: Performed by: INTERNAL MEDICINE

## 2022-12-12 PROCEDURE — 97530 THERAPEUTIC ACTIVITIES: CPT

## 2022-12-12 PROCEDURE — 97165 OT EVAL LOW COMPLEX 30 MIN: CPT

## 2022-12-12 PROCEDURE — 92610 EVALUATE SWALLOWING FUNCTION: CPT

## 2022-12-12 PROCEDURE — 97161 PT EVAL LOW COMPLEX 20 MIN: CPT

## 2022-12-12 PROCEDURE — 6370000000 HC RX 637 (ALT 250 FOR IP): Performed by: INTERNAL MEDICINE

## 2022-12-12 PROCEDURE — 92526 ORAL FUNCTION THERAPY: CPT

## 2022-12-12 PROCEDURE — 1200000000 HC SEMI PRIVATE

## 2022-12-12 RX ORDER — TRAMADOL HYDROCHLORIDE 50 MG/1
50 TABLET ORAL EVERY 6 HOURS PRN
Status: DISCONTINUED | OUTPATIENT
Start: 2022-12-12 | End: 2022-12-16 | Stop reason: HOSPADM

## 2022-12-12 RX ADMIN — POLYETHYLENE GLYCOL-3350 AND ELECTROLYTES 4000 ML: 236; 6.74; 5.86; 2.97; 22.74 POWDER, FOR SOLUTION ORAL at 13:43

## 2022-12-12 RX ADMIN — ACETAMINOPHEN 650 MG: 325 TABLET ORAL at 23:10

## 2022-12-12 RX ADMIN — ENOXAPARIN SODIUM 40 MG: 100 INJECTION SUBCUTANEOUS at 13:09

## 2022-12-12 RX ADMIN — TRAMADOL HYDROCHLORIDE 50 MG: 50 TABLET, COATED ORAL at 18:03

## 2022-12-12 RX ADMIN — TRAMADOL HYDROCHLORIDE 50 MG: 50 TABLET, COATED ORAL at 09:26

## 2022-12-12 RX ADMIN — ACETAMINOPHEN 650 MG: 325 TABLET ORAL at 02:15

## 2022-12-12 RX ADMIN — ACETAMINOPHEN 650 MG: 325 TABLET ORAL at 13:07

## 2022-12-12 RX ADMIN — POLYETHYLENE GLYCOL 3350 17 G: 17 POWDER, FOR SOLUTION ORAL at 09:26

## 2022-12-12 RX ADMIN — DOCUSATE SODIUM 100 MG: 100 CAPSULE, LIQUID FILLED ORAL at 09:24

## 2022-12-12 ASSESSMENT — PAIN DESCRIPTION - DESCRIPTORS
DESCRIPTORS: ACHING;BURNING
DESCRIPTORS: ACHING

## 2022-12-12 ASSESSMENT — PAIN SCALES - WONG BAKER: WONGBAKER_NUMERICALRESPONSE: 6

## 2022-12-12 ASSESSMENT — PAIN SCALES - GENERAL
PAINLEVEL_OUTOF10: 6
PAINLEVEL_OUTOF10: 4
PAINLEVEL_OUTOF10: 10
PAINLEVEL_OUTOF10: 6
PAINLEVEL_OUTOF10: 5
PAINLEVEL_OUTOF10: 4
PAINLEVEL_OUTOF10: 4
PAINLEVEL_OUTOF10: 7

## 2022-12-12 ASSESSMENT — PAIN DESCRIPTION - LOCATION
LOCATION: ABDOMEN
LOCATION: HEAD
LOCATION: BACK
LOCATION: HEAD

## 2022-12-12 ASSESSMENT — PAIN - FUNCTIONAL ASSESSMENT
PAIN_FUNCTIONAL_ASSESSMENT: PREVENTS OR INTERFERES WITH ALL ACTIVE AND SOME PASSIVE ACTIVITIES
PAIN_FUNCTIONAL_ASSESSMENT: PREVENTS OR INTERFERES SOME ACTIVE ACTIVITIES AND ADLS
PAIN_FUNCTIONAL_ASSESSMENT: PREVENTS OR INTERFERES WITH ALL ACTIVE AND SOME PASSIVE ACTIVITIES

## 2022-12-12 ASSESSMENT — PAIN DESCRIPTION - ORIENTATION
ORIENTATION: RIGHT;LEFT
ORIENTATION: RIGHT;LEFT;MID
ORIENTATION: RIGHT;LEFT

## 2022-12-12 NOTE — PROGRESS NOTES
Hospital Medicine    Subjective:  pt c/o back pain      Current Facility-Administered Medications:     traMADol (ULTRAM) tablet 50 mg, 50 mg, Oral, Q6H PRN, Cruzito Garay Malmer, DO    amLODIPine (NORVASC) tablet 5 mg, 5 mg, Oral, Daily, Tarkika Garay Malmer, DO    docusate sodium (COLACE) capsule 100 mg, 100 mg, Oral, BID, Cruzito Garay Malmer, DO, 100 mg at 12/11/22 2040    pantoprazole (PROTONIX) tablet 40 mg, 40 mg, Oral, QAM AC, Tarkika Garay Malmer, DO    sodium chloride flush 0.9 % injection 5-40 mL, 5-40 mL, IntraVENous, 2 times per day, Deb Noon, DO, 10 mL at 12/11/22 1005    sodium chloride flush 0.9 % injection 5-40 mL, 5-40 mL, IntraVENous, PRN, Cruzito Mata, DO    0.9 % sodium chloride infusion, , IntraVENous, PRN, Cruzito Garay Malmer, DO    enoxaparin (LOVENOX) injection 40 mg, 40 mg, SubCUTAneous, Daily, Tarkika Garay Malmer, DO, 40 mg at 12/11/22 1004    ondansetron (ZOFRAN-ODT) disintegrating tablet 4 mg, 4 mg, Oral, Q8H PRN **OR** ondansetron (ZOFRAN) injection 4 mg, 4 mg, IntraVENous, Q6H PRN, Tarkika Garay Malmer, DO    acetaminophen (TYLENOL) tablet 650 mg, 650 mg, Oral, Q6H PRN, 650 mg at 12/12/22 0215 **OR** acetaminophen (TYLENOL) suppository 650 mg, 650 mg, Rectal, Q6H PRN, Tarry Jarrod Malmer, DO    polyethylene glycol (GoLYTELY) solution 4,000 mL, 4,000 mL, Oral, Once, Atul Bagent, DO    polyethylene glycol (GLYCOLAX) packet 17 g, 17 g, Oral, Daily, Atul Bagent, DO, 17 g at 12/11/22 1558    senna (SENOKOT) tablet 8.6 mg, 1 tablet, Oral, Nightly, Atul Bagent, DO, 8.6 mg at 12/11/22 2040    0.9 % sodium chloride infusion, , IntraVENous, Continuous, iVcki Gambino MD, Last Rate: 100 mL/hr at 12/11/22 0442, New Bag at 12/11/22 0442    Objective:    /76   Pulse (!) 101   Temp 97.3 °F (36.3 °C) (Temporal)   Resp 18   Ht 5' 4\" (1.626 m)   Wt 115 lb (52.2 kg)   SpO2 95%   BMI 19.74 kg/m²     Heart:  reg  Lungs:  ctab  Abd: + bs soft nondistended  Extrem:  w/o edema    CBC with Differential:    Lab Results   Component Value Date/Time    WBC 7.1 12/10/2022 01:34 PM    RBC 5.22 12/10/2022 01:34 PM    HGB 15.6 12/10/2022 01:34 PM    HCT 47.9 12/10/2022 01:34 PM     12/10/2022 01:34 PM    MCV 91.8 12/10/2022 01:34 PM    MCH 29.9 12/10/2022 01:34 PM    MCHC 32.6 12/10/2022 01:34 PM    RDW 15.6 12/10/2022 01:34 PM    LYMPHOPCT 8.3 12/10/2022 01:34 PM    MONOPCT 7.7 12/10/2022 01:34 PM    BASOPCT 0.7 12/10/2022 01:34 PM    MONOSABS 0.55 12/10/2022 01:34 PM    LYMPHSABS 0.59 12/10/2022 01:34 PM    EOSABS 0.04 12/10/2022 01:34 PM    BASOSABS 0.05 12/10/2022 01:34 PM     CMP:    Lab Results   Component Value Date/Time     12/10/2022 01:34 PM    K 4.3 12/10/2022 01:34 PM    K 3.8 12/04/2022 04:30 AM    CL 98 12/10/2022 01:34 PM    CO2 22 12/10/2022 01:34 PM    BUN 10 12/10/2022 01:34 PM    CREATININE 0.6 12/10/2022 01:34 PM    GFRAA >60 11/14/2015 04:10 AM    LABGLOM >60 12/10/2022 01:34 PM    GLUCOSE 85 12/10/2022 01:34 PM    PROT 7.2 12/10/2022 01:34 PM    LABALBU 3.8 12/10/2022 01:34 PM    CALCIUM 9.9 12/10/2022 01:34 PM    BILITOT 0.3 12/10/2022 01:34 PM    ALKPHOS 146 12/10/2022 01:34 PM    AST 22 12/10/2022 01:34 PM    ALT 15 12/10/2022 01:34 PM     Warfarin PT/INR:    Lab Results   Component Value Date    INR 1.0 11/14/2015    PROTIME 10.9 11/14/2015       Assessment:    Principal Problem:    Dysphagia  Resolved Problems:    * No resolved hospital problems. *      Plan:   For egd cscope pt ot dc planning        Placido Khan DO  8:31 AM  12/12/2022

## 2022-12-12 NOTE — PROGRESS NOTES
Neurosurg progress note  VITALS:  /76   Pulse (!) 101   Temp 97.3 °F (36.3 °C) (Temporal)   Resp 16   Ht 5' 4\" (1.626 m)   Wt 115 lb (52.2 kg)   SpO2 95%   BMI 19.74 kg/m²   24HR INTAKE/OUTPUT:    Intake/Output Summary (Last 24 hours) at 12/12/2022 1143  Last data filed at 12/12/2022 1960  Gross per 24 hour   Intake 1337 ml   Output 2550 ml   Net -1213 ml     CT THORACIC SPINE WO CONTRAST    Result Date: 12/10/2022  EXAMINATION: CT OF THE LUMBAR SPINE WITHOUT CONTRAST; CT OF THE THORACIC SPINE WITHOUT CONTRAST  12/10/2022 TECHNIQUE: CT of the lumbar spine was performed without the administration of intravenous contrast. Multiplanar reformatted images are provided for review. Adjustment of mA and/or kV according to patient size was utilized. Automated exposure control, iterative reconstruction, and/or weight based adjustment of the mA/kV was utilized to reduce the radiation dose to as low as reasonably achievable.; CT of the thoracic spine was performed without the administration of intravenous contrast. Multiplanar reformatted images are provided for review. Automated exposure control, iterative reconstruction, and/or weight based adjustment of the mA/kV was utilized to reduce the radiation dose to as low as reasonably achievable. COMPARISON: None HISTORY: ORDERING SYSTEM PROVIDED HISTORY: compression fracture TECHNOLOGIST PROVIDED HISTORY: Reason for exam:->compression fracture Decision Support Exception - unselect if not a suspected or confirmed emergency medical condition->Emergency Medical Condition (MA) What reading provider will be dictating this exam?->CRC FINDINGS: CT thoracic spine. Multiple compression fractures are identified in the thoracic spine which involves but T6, T8, T9, T11 and T12 with nearly 30-50% loss of height. Compression fractures of L1-L2 and L3 are also noted. There is osteopenia and degenerative changes. The lung bases demonstrate atelectasis.   There is coronary artery calcification. A     Extensive compression fractures of the thoracic spine as noted with kyphosis. Atelectasis in the lung bases. CT lumbar spine. Comparison 11/09/2022 Findings There is significant compression deformity of L1, L2 and L3 with loss of height ranging from but 30-50% with progressive compression of L2. There is mild retropulsion of the bony fragments at L1  ,L2. There is osteopenia and degenerative changes in the lumbar spine. Impression Diffuse compression fractures of L1 ,L2 and L3 as noted with progressive compression of L2. There is retropulsion of the bony fragments at L1 and L2. RECOMMENDATIONS: Unavailable     CT LUMBAR SPINE WO CONTRAST    Result Date: 12/10/2022  EXAMINATION: CT OF THE LUMBAR SPINE WITHOUT CONTRAST; CT OF THE THORACIC SPINE WITHOUT CONTRAST  12/10/2022 TECHNIQUE: CT of the lumbar spine was performed without the administration of intravenous contrast. Multiplanar reformatted images are provided for review. Adjustment of mA and/or kV according to patient size was utilized. Automated exposure control, iterative reconstruction, and/or weight based adjustment of the mA/kV was utilized to reduce the radiation dose to as low as reasonably achievable.; CT of the thoracic spine was performed without the administration of intravenous contrast. Multiplanar reformatted images are provided for review. Automated exposure control, iterative reconstruction, and/or weight based adjustment of the mA/kV was utilized to reduce the radiation dose to as low as reasonably achievable. COMPARISON: None HISTORY: ORDERING SYSTEM PROVIDED HISTORY: compression fracture TECHNOLOGIST PROVIDED HISTORY: Reason for exam:->compression fracture Decision Support Exception - unselect if not a suspected or confirmed emergency medical condition->Emergency Medical Condition (MA) What reading provider will be dictating this exam?->CRC FINDINGS: CT thoracic spine.  Multiple compression fractures are identified in the thoracic spine which involves but T6, T8, T9, T11 and T12 with nearly 30-50% loss of height. Compression fractures of L1-L2 and L3 are also noted. There is osteopenia and degenerative changes. The lung bases demonstrate atelectasis. There is coronary artery calcification. A     Extensive compression fractures of the thoracic spine as noted with kyphosis. Atelectasis in the lung bases. CT lumbar spine. Comparison 11/09/2022 Findings There is significant compression deformity of L1, L2 and L3 with loss of height ranging from but 30-50% with progressive compression of L2. There is mild retropulsion of the bony fragments at L1  ,L2. There is osteopenia and degenerative changes in the lumbar spine. Impression Diffuse compression fractures of L1 ,L2 and L3 as noted with progressive compression of L2.   There is retropulsion of the bony fragments at L1 and L2. RECOMMENDATIONS: Unavailable     CBC:   Lab Results   Component Value Date/Time    WBC 7.1 12/10/2022 01:34 PM    RBC 5.22 12/10/2022 01:34 PM    HGB 15.6 12/10/2022 01:34 PM    HCT 47.9 12/10/2022 01:34 PM    MCV 91.8 12/10/2022 01:34 PM    MCH 29.9 12/10/2022 01:34 PM    MCHC 32.6 12/10/2022 01:34 PM    RDW 15.6 12/10/2022 01:34 PM     12/10/2022 01:34 PM    MPV 9.2 12/10/2022 01:34 PM     BMP:    Lab Results   Component Value Date/Time     12/10/2022 01:34 PM    K 4.3 12/10/2022 01:34 PM    K 3.8 12/04/2022 04:30 AM    CL 98 12/10/2022 01:34 PM    CO2 22 12/10/2022 01:34 PM    BUN 10 12/10/2022 01:34 PM    LABALBU 3.8 12/10/2022 01:34 PM    CREATININE 0.6 12/10/2022 01:34 PM    CALCIUM 9.9 12/10/2022 01:34 PM    GFRAA >60 11/14/2015 04:10 AM    LABGLOM >60 12/10/2022 01:34 PM    GLUCOSE 85 12/10/2022 01:34 PM      amLODIPine  5 mg Oral Daily    docusate sodium  100 mg Oral BID    pantoprazole  40 mg Oral QAM AC    sodium chloride flush  5-40 mL IntraVENous 2 times per day    enoxaparin  40 mg SubCUTAneous Daily    polyethylene glycol  4,000 mL Oral Once    polyethylene glycol  17 g Oral Daily    senna  1 tablet Oral Nightly     Resting comfortably in bed awake and alert oriented friendly and cooperative undergoing bowel prep for colonoscopy followed commands speech was fluent pupils equal round and reactive  Assessment:  Patient Active Problem List   Diagnosis    Lumbar compression fracture, closed, initial encounter ([facility]    Constipation    Unable to care for self    Dysphagia     Plan: for colonoscopy, TLSO brace when Abad Ramos MD M.D.

## 2022-12-12 NOTE — PROGRESS NOTES
Physical Therapy  Physical Therapy Initial Assessment     Name: Nicolle Sotelo  : 1950  MRN: [id number]      Date of Service: 2022    Evaluating PT:  Peace Silverman, PT, DPT [id number]    Room #:  2208/8959-I  Diagnosis:  Odynophagia [R13.10]  Dysphagia [R13.10]  Lumbar compression fracture, closed, [facility] ([facility] [S32.000A]  Dysphagia, unspecified type [R13.10]  Compression fracture of thoracic vertebra, unspecified thoracic vertebral level, initial encounter ([facility] [S22.000A]  PMHx/PSHx:  Osteoporosis, HTN, vertebral compression fracture  Procedure/Surgery:  None  Precautions:  Falls, spinal precautions, TLSO  Equipment Needs:  FWW  Equipment Owned:  SPC, standard walker    SUBJECTIVE:    Pt lives with  in a 1 story home with 4 stair(s) to enter and 1 rail(s). Bed is on 1st floor and bath is on 1st floor. Pt ambulated using SPC with assistance as needed PTA. OBJECTIVE:   Initial Evaluation  Date: 2022 Treatment Short Term/ Long Term   Goals   AM-PAC 6 Clicks 56/26     Was pt agreeable to Eval/treatment? Yes     Does pt have pain? 7/10 abdomen, back, headache     Bed Mobility  Rolling: SBA  Supine to sit: SBA  Sit to supine: NT  Scooting: NT  Rolling: Independent  Supine to sit: Independent  Sit to supine: Independent  Scooting: Independent   Transfers Sit to stand: SBA  Stand to sit: Priya  Stand pivot: NT  Sit to stand: Mod I  Stand to sit: Mod I  Stand pivot: Mod I with AAD   Ambulation    15 feet with SPC + HHA Priya  50 feet with [employer] Priya  150 feet with AAD Mod I   Stair negotiation: ascended and descended  NT  4 step(s) with 1 rail(s) Mod I   ROM BUE:  See OT note  BLE:  WFL     Strength BUE:  See OT note  BLE:  Grossly 5/5     Balance Sitting EOB:  SBA  Dynamic Standing:  Priya with SPC + HHA; Priya with [employer]  Sitting EOB:  Independent  Dynamic Standing:   Mod I with AAD     Pt is A & O x 4  Sensation:  Pt denies numbness/tingling to extremities  Edema: Unremarkable    Vitals:   , SpO2 92-97%, /81 sitting EOB. Nursing notified of BP. Patient education  Pt educated on spinal precautions, use of TLSO, role of PT, safety during functional mobility, use of call light for assistance. Patient response to education:   Pt verbalized understanding Pt demonstrated skill Pt requires further education in this area   Yes Yes Yes     ASSESSMENT:    Conditions Requiring Skilled Therapeutic Intervention:    [x]Decreased strength     []Decreased ROM  [x]Decreased functional mobility  [x]Decreased balance   [x]Decreased endurance   []Decreased posture  []Decreased sensation  []Decreased coordination   []Decreased vision  [x]Decreased safety awareness   [x]Increased pain       Comments:  Patient in semi-Garza's position upon arrival; agreeable to PT evaluation with OT collaboration. Rolling performed multiple times in order for pad underneath patient to be removed, undergarment to be donned, and TLSO to be donned. Required increased time to perform bed mobility via log roll technique. Patient sat EOB for extended period of time. Patient ambulated with decreased speed. Seated rest break provided between ambulation bouts. Reported lightheadedness and feeling as if they were going to \"pass out\" during activity; vitals monitored and noted; nursing notified. Requested to return to bed at end of session. Patient left in Garza's position with TLSO donned, BLE elevated, call light in reach. Instructed not to get up on own and to use call light for assistance; verbalized understanding. Patient would benefit from continued skilled PT services to address functional deficits and prevent deconditioning.     Treatment:  Patient practiced and was instructed in the following treatment:    Bed mobility - verbal cues to facilitate proper positioning and sequencing regarding log roll technique; physical assistance provided as needed during activity  Static sitting - performed to promote upright tolerance and balance maintenance  Functional transfers - verbal cues to facilitate proper positioning and sequencing, particularly related to hand/foot placement; physical assistance provided as needed during activity  Ambulation - verbal cues to facilitate proper positioning, particularly related to walker approximation; physical assistance provided as needed during activity  Skilled positioning - patient positioned optimally to protect skin/joint integrity and promote comfort    Pt's/ family goals   1. None stated    Prognosis is good for reaching above PT goals. Patient and or family understand(s) diagnosis, prognosis, and plan of care. Yes    PHYSICAL THERAPY PLAN OF CARE:    PT POC is established based on physician order and patient diagnosis     Referring provider/PT Order:    12/12/22 0845  PT eval and treat  Start:  12/12/22 0845,   End:  12/12/22 0845,   ONE TIME,   Standing Count:  1 Occurrences,   R         Josh Mata,      Diagnosis:  Odynophagia [R13.10]  Dysphagia [R13.10]  Lumbar compression fracture, closed, initial encounter ([facility] [S32.000A]  Dysphagia, unspecified type [R13.10]  Compression fracture of thoracic vertebra, unspecified thoracic vertebral level, initial encounter ([facility] [S22.000A]  Specific instructions for next treatment:  Continue to facilitate safe performance of functional mobility; progress as appropriate.     Current Treatment Recommendations:     [x] Strengthening to improve independence with functional mobility   [] ROM to improve independence with functional mobility   [x] Balance Training to improve static/dynamic balance and to reduce fall risk  [x] Endurance Training to improve activity tolerance during functional mobility   [x] Transfer Training to improve safety and independence with all functional transfers   [x] Gait Training to improve gait mechanics, endurance and assess need for appropriate assistive device  [x] Stair Training in preparation for safe discharge home and/or into the community   [x] Positioning to prevent skin breakdown and contractures  [x] Safety and Education Training   [x] Patient/Caregiver Education   [] HEP  [] Other     PT long term treatment goals are located in above grid    Frequency of treatments: 2-5x/week x 1-2 weeks. Time in  1110  Time out  1150    Total Treatment Time  25 minutes     Evaluation Time includes thorough review of current medical information, gathering information on past medical history/social history and prior level of function, completion of standardized testing/informal observation of tasks, assessment of data and education on plan of care and goals.     CPT codes:  [x] Low Complexity PT evaluation 68300  [] Moderate Complexity PT evaluation 25828  [] High Complexity PT evaluation 19797  [] PT Re-evaluation 28989  [] Gait training 86747 0 minutes  [] Manual therapy 23131 0 minutes  [x] Therapeutic activities 56139 25 minutes  [] Therapeutic exercises 21245 0 minutes  [] Neuromuscular reeducation 26604 0 minutes     Jose M Stallings, PT, DPT  [id number]

## 2022-12-12 NOTE — PROGRESS NOTES
[address]  [address] [place] [address]      Date:2022                 Patient Name: Atilio Alejo  MRN: [id number]  : 1950  Room: [address] [place] [address]    Referring Provider: Deb Hernandez DO  Specific Provider Orders/Date: OT evaluation and treat 22    Evaluating OT: Teagan Short. Josefina OTR/L #[id number]    Diagnosis: Odynophagia [R13.10]  Dysphagia [R13.10]  Lumbar compression fracture, closed, initial encounter ([facility] [S32.000A]  Dysphagia, unspecified type [R13.10]  Compression fracture of thoracic vertebra, unspecified thoracic vertebral level, [facility] [S22.000A]      Surgery: History reviewed. No pertinent surgical history. Pertinent Medical History:  has a past medical history of Bruising tendency, Hyperlipidemia, Hypertension, and Osteoporosis.      Precautions:  Fall Risk, TLSO brace, spinal, [place]    Assessment of current deficits   [x] Functional mobility  [x]ADLs  [] Strength               []Cognition   [x] Functional transfers   [x] IADLs         [x] Safety Awareness   [x]Endurance   [] Fine Coordination              [x] Balance      [] Vision/perception   []Sensation    []Gross Motor Coordination  [] ROM  [] Delirium                   [] Motor Control     OT PLAN OF CARE   OT POC based on physician orders, patient diagnosis and results of clinical assessment    Frequency/Duration   2-4 days/wk for 1 week PRN   Specific OT Treatment Interventions to include:   * Instruction/training on adapted ADL techniques and AE recommendations to increase functional independence within precautions       * Training on energy conservation strategies, correct breathing pattern and techniques to improve independence/tolerance for self-care routine  * Functional transfer/mobility training/DME recommendations for increased independence, safety, and fall prevention  * Patient/Family education to increase follow through with safety techniques and functional independence  * Recommendation of environmental modifications for increased safety with functional transfers/mobility and ADLs  * Therapeutic activities to facilitate/challenge dynamic balance, stand tolerance for increased safety and independence with ADLs  * Therapeutic activities to facilitate gross/fine motor skills for increased independence with ADLs  * Positioning to improve skin integrity, interaction with environment and functional independence    Recommended Adaptive Equipment: ww, AE prn, shower seat    Home Living: Pt lives with   in a one story home with 4 steps and one hand rails. Bed and bath on main  floor.   Family/ Outside assistance available:    Bathroom setup: tub shower   Equipment owned: std walker, spc    Prior Level of Function: assisted as needed patient sponge bathed since fall  with ADLs , assisted with IADLs; ambulated spc and std walker  Driving: no  Occupation: retired  Medication management: self  Leisure: [employer] and going out to eat    Pain Level: 7/10 back , head and abdominal pain    Cognition: A&O: 4/4; Follows one step directions  with increased volume   Memory:  fair    Sequencing:  fair   Problem solving:  fair   Judgement/safety:  fair     Functional Assessment:  AM-PAC Daily Activity Raw Score: 15/24   Initial Eval Status  Date: 12/12/22 Treatment Status  Date: STGs = LTGs  Time frame: 1-3days   Feeding Liquids able to open packages  Mod I    Grooming SBA  Mod I    UB Dressing Mod A for TLSO brace donning in supine  Mod I    LB Dressing Mod A to don pants and undergarment in supine  Mod I with AE prn   Bathing Simulated mod A   Mod I seated with LHS   Toileting Mod A incontinent in undergarment/ purr wick  Mod I with ww    Bed Mobility  Supine to sit: SBA   Sit to supine:  SBA  Rolling min A   Supine to sit: mod I log rolling  Sit to supine: mod I   Functional Transfers Sit to stand min A with spc and needing increased support, min A with ww seated EOB /81   Mod I with ww   Functional Mobility Min A in room short distance with increased fatigue and thought of fainting   Mod I with ww   Balance Sitting:     Static:  SBA    Dynamic:CGA  Standing: min A                                                                        Activity Tolerance Fair limited by pain and fatigue O2 98%   Good for ADL completion   Visual/  Perceptual Glasses: yes reading         Safety fair                                  Good with spinal precautions and TLSO brace wear and follow through     Hand Dominance R    AROM (PROM) Strength Additional Info:    RUE  WFL 4/5 good  and wfl FMC/dexterity noted during ADL tasks       LUE WFL 4/5 good  and wfl FMC/dexterity noted during ADL tasks     Hearing: WFL slight [place]  Sensation:   No c/o numbness or tingling   Tone: WFL   Edema: none noted    Comments: Upon arrival patient supine and agreeable to evaluation with encouragement. Performed OT evaluation with education on spinal precautions and ADL completion , safeaty, TLSO brace donning in supine. Benefits of upright sitting and need to wear TLSO brace to acclimate  to it. At end of session, patient supine with HOB elevated and  with call light and phone within reach, all lines and tubes intact. Nursing notified. Overall patient demonstrated  decreased independence and safety during completion of ADL/functional transfer/mobility tasks. Pt would benefit from continued skilled OT to increase safety and independence with completion of ADL/IADL tasks for functional independence and quality of life.     Treatment: OT treatment provided this date includes:   Instruction/training on safety and adapted techniques for completion of ADLs: to increase [place] in self care within precautions  with AE/DME prn   Functional transfer/mobility training/DME recommendations for increased  independence, safety, and fall prevention with  ww   Instruction/training on energy conservation/work simplification for completion of ADLs: techniques to increase [place] with self care ADLs & iADLs, work simplification to improve endurance   Proper Positioning/Alignment: for optimal healing, skin integrity to prevent breakdown, decrease edema  Skilled monitoring of vitals: to include BP, spO2 & HR during session  Sitting/standing Balance/Tolerance- to increased balance & activity tolerance during ADLs as well as facilitate proper posture and/or positioning. Therapeutic exercise- Instruction on B UE ROM exercises to improve strength/function for increased [place] with ADLs & iADLs    Rehab Potential: Good  for established goals     Patient / Family Goal: decrease pain       Patient and/or family were instructed on functional diagnosis, prognosis/goals and OT plan of care. Demonstrated good understanding. Eval Complexity: low    Time In: 11:15  Time Out: 11:45  Total Treatment Time: 15 min. Min Units   OT Eval Low 11990  X     OT Eval Medium 31908      OT Eval High [phone]       OT Re-Eval [id number]       Therapeutic Ex [phone]       Therapeutic Activities 46095  15  1   ADL/Self Care 03112       Orthotic Management 14914       Neuro Re-Ed 84059       Non-Billable Time          Evaluation Time includes thorough review of current medical information, gathering information on past medical history/social history and prior level of function, completion of standardized testing/informal observation of tasks, assessment of data and education on plan of care and goals. Rashad Hood.  [place] [address], [place] [address]

## 2022-12-12 NOTE — PROGRESS NOTES
SPEECH/LANGUAGE PATHOLOGY  CLINICAL ASSESSMENT OF SWALLOWING FUNCTION   and PLAN OF CARE    PATIENT NAME:  Claire Ybarra  (female)     MRN:  [id number]    :  1950  (77 y.o.)  STATUS:  Inpatient: Room 5403/5403-B    TODAY'S DATE:  2022  REFERRING PROVIDER:   Dandre Bryant DO  SPECIFIC PROVIDER ORDER: SLP swallowing-dysphagia evaluation and treatment Date of order:  22  REASON FOR REFERRAL: To assess oropharyngeal swallow fx s/p recent hospital admission. EVALUATING THERAPIST: GERALD Mata                 RESULTS:    DYSPHAGIA DIAGNOSIS:   Clinical indicators of limited intake on exam, unable to determine type or severity of dysphagia; questionable pharyngeal phase dysphagia and esophageal motility deficit suspected    Pt is reportedly scheduled for EGD tomorrow's date. DIET RECOMMENDATIONS:  Continue on clear liquid diet until cleared by Physician to advance; rec medication administration crushed and mixed w/ puree at this time. Recommend re-assessment of swallow fx when pt completes upcoming procedures and when pt cleared to advance from clear liquid diet; MBSS may be appropriate at that time. FEEDING RECOMMENDATIONS:     Assistance level:  Supervision is needed during all oral intake      Compensatory strategies recommended: Small bites/sips      Discussed recommendations with nursing and/or faxed report to referring provider: Yes; informed pt's nurse re: rec for continuing clear liquid diet and medication administration rec at this time, as well as rec to re-assess swallow fx once pt cleared from clear liquid and upcoming procedures are completed.      SPEECH THERAPY  PLAN OF CARE   The dysphagia POC is established based on physician order, dysphagia diagnosis and results of clinical assessment     Skilled SLP intervention for dysphagia management on acute care 3-5 x per week until goals met, pt plateaus in function and/or discharged from hospital    Conditions Requiring Skilled Therapeutic Intervention for dysphagia:    Patient is performing below functional baseline d/t  current acute condition, Multiple diagnoses, multiple medications, and increased dependency upon caregivers. Throat clearing during PO intake     Specific dysphagia interventions to include:     Compensatory strategy training   Meal time assessment for 1-2 sessions to provide diet modification and compensatory strategy implementation due to inconsistent episodes of clinical indicators of dysphagia during intake and due to need to ensure proper implementation of compensatory strategies during PO intake     Specific instructions for next treatment:  development and training of compensatory swallow strategies to improve airway protection and swallow function  Patient Treatment Goals:    Short Term Goals:  Pt will implement identified compensatory swallowing strategies on 90% of opportunities or greater to improve airway protection and swallow function. Pt will participate in ongoing mealtime assessment to provide diet modification and compensatory strategy implementation to minimize risk of aspiration associated with PO intake  Pt will participate in meal time assessment for 1-2 sessions to provide diet modification and compensatory strategy implementation due to need to ensure proper implementation of compensatory strategies during PO intake     Long Term Goals:   Pt will maintain adequate nutrition/hydration via PO intake of the least restrictive oral diet with implementation of safe swallow/ compensatory strategies and decrease signs/symptoms of aspiration to less than 1 x/day.       Patient/family Goal:    To consume solid foods without feeling like they get stuck    Plan of care discussed with Patient   The Patient guardedly understand(s) the diagnosis, prognosis and plan of care     Rehabilitation Potential/Prognosis: fair                    ADMITTING DIAGNOSIS: Odynophagia [R13.10]  Dysphagia [R13.10]  Lumbar compression fracture, closed, initial encounter ([facility] [S32.000A]  Dysphagia, unspecified type [R13.10]  Compression fracture of thoracic vertebra, unspecified thoracic vertebral level, [facility] [S22.000A]    VISIT DIAGNOSIS:   Visit Diagnoses         Codes    Compression fracture of thoracic vertebra, unspecified thoracic vertebral level, initial encounter ([facility]    -  Primary S22.000A    Odynophagia     R13.10             PATIENT REPORT/COMPLAINT: Pt reported foods sticking at times. Pt reported difficulty with medications whole. RN cleared patient for participation in assessment     yes-RN reported no overt s/s of aspiration w/ clear liquid diet but that pt demo's \"hard swallows. \"      PRIOR LEVEL OF SWALLOW FUNCTION:    PAST HISTORY OF DYSPHAGIA?: none reported    Home diet: Diet information not available     Current Diet Order:  ADULT DIET; Clear Liquid; No red dye  Diet NPO Exceptions are: Sips of Water with Meds    PROCEDURE:  Consistencies Administered During the Evaluation   Liquids: thin liquid   Solids:  pureed foods (applesauce)-cleared by RN to attempt      Method of Intake:   cup, straw, spoon  Self fed, Fed by clinician      Position:   Seated, upright-pt complaining of positioning of TLSO brace-nursing staff aware. CLINICAL ASSESSMENT:  Oral Stage:       Insufficient trials to fully assess oral stage this date. Pharyngeal Stage:    W/ thin: pt tolerated 3/3 from cup and 2/2 from straw w/o overt s/s of aspiration; X1 double swallow noted    Inconsistent burping noted w/ thin liquid intake    W/ small tsps of applesauce: pt tolerated 3/3 w/o overt s/s of aspiration; pt presented with double swallow X1 noted and delayed dry throat clear noted X1. Pt did report \"sticking\" with puree texture. Suspect effects of esophageal function at this time.      Cognition:   Within functional limits for this exam    Oral Peripheral Examination   Generalized oral weakness-mild noted     Current Respiratory Status    room air     Parameters of Speech Production  Respiration:  Adequate for speech production  Quality:   Within functional limits  Intensity: Within functional limits    Volitional Swallow: not able to elicit     Volitional Cough:   DNT    Pain: Pt reported having headache-pt's nurse informed. EDUCATION:   The Speech Language Pathologist (SLP) completed education regarding results of evaluation and that intervention is warranted at this time. Learner: Patient  Education: Reviewed results and recommendations of this evaluation, Reviewed diet and strategies, and Reviewed signs, symptoms and risks of aspiration  Evaluation of Education:  Verbalizes understanding and Needs further instruction    This plan may be re-evaluated and revised as warranted. Evaluation Time includes thorough review of current medical information, gathering information on past medical history/social history and prior level of function, completion of standardized testing/informal observation of tasks, assessment of data and education on plan of care and goals. [x]The admitting diagnosis and active problem list, have been reviewed prior to initiation of this evaluation. ACTIVE PROBLEM LIST:   Patient Active Problem List   Diagnosis    Lumbar compression fracture, closed, initial encounter ([facility]    Constipation    Unable to care for self    Dysphagia         CPT code:  29902  bedside swallow eval    Tx note (16364): SLP educated pt re: overt s/s of aspiration and possible effects of esophageal fx on swallowing. SLP reviewed rec for continuing clear liquid diet at this time until procedures complete/pt cleared to advance from clear liquids. Questions answered to this time. Pt with crackers in room despite being on clear liquid diet-pt agreeable for SLP to discard, which SLP did. Nurse informed. Pt reported wanting re-positioned in bed following-SLP informed pt's nurse of this.  Pt left in room w/ ric w/I reach following.

## 2022-12-12 NOTE — CARE COORDINATION
12/12. Met with the pt at the bedside to discuss transition of care. The pt lives in a one floor home with her . She is unsure fif she will need rehab before she returns home. She is scheduled for a EGD/colonoscopy tomorrow. List of [employer] facilities provided.  Camilo Fink RN

## 2022-12-12 NOTE — PROGRESS NOTES
[facility] on call notified that patient has only taken in one glass of golytely and has not had a bowell movement.

## 2022-12-12 NOTE — PROGRESS NOTES
GENERAL SURGERY  DAILY PROGRESS NOTE  12/12/2022    CHIEF COMPLAINT:  Chief Complaint   Patient presents with    Dysphagia     Patient called ambulance for back pain because she thinks she broke another bone in her back, now she states she is having difficulty since yesterday. SUBJECTIVE:  Patient still complains of back pain. Is having mild crampy abdominal pain and states she feels constipated. Denies any nausea or emesis. OBJECTIVE:  /76   Pulse (!) 101   Temp 97.3 °F (36.3 °C) (Temporal)   Resp 18   Ht 5' 4\" (1.626 m)   Wt 115 lb (52.2 kg)   SpO2 95%   BMI 19.74 kg/m²     GENERAL: No acute distress, answers questions appropriately  LUNGS:  No increased work of breathing on room air  CARDIOVASCULAR: Hemodynamically stable  ABDOMEN: Soft and nontender. No rebound or rigidity. No obvious distention. ASSESSMENT/PLAN:  67 y.o. female recent weight loss, dysphagia and constipation    Plan  -Follow-up speech eval and recommendations  -Current bowel regimen with lactulose, senna, and GlycoLax   -We will give Dulcolax suppository today  -To start bowel prep this afternoon  -ED colonoscopy tomorrow    Plan discussed with Dr. Prieto Vale, DO  Surgery Resident PGY-1  12/12/2022  7:13 AM     The patient was seen and examined and the chart was reviewed. I agree with the assessment and plan.

## 2022-12-13 ENCOUNTER — ANESTHESIA EVENT (OUTPATIENT)
Dept: ENDOSCOPY | Age: 72
End: 2022-12-13
Payer: MEDICARE

## 2022-12-13 ENCOUNTER — ANESTHESIA (OUTPATIENT)
Dept: ENDOSCOPY | Age: 72
End: 2022-12-13
Payer: MEDICARE

## 2022-12-13 LAB
ANION GAP SERPL CALCULATED.3IONS-SCNC: 16 MMOL/L (ref 7–16)
BASOPHILS ABSOLUTE: 0.06 E9/L (ref 0–0.2)
BASOPHILS RELATIVE PERCENT: 1.4 % (ref 0–2)
BUN BLDV-MCNC: 2 MG/DL (ref 6–23)
CALCIUM SERPL-MCNC: 9.1 MG/DL (ref 8.6–10.2)
CHLORIDE BLD-SCNC: 102 MMOL/L (ref 98–107)
CO2: 16 MMOL/L (ref 22–29)
CREAT SERPL-MCNC: 0.4 MG/DL (ref 0.5–1)
EOSINOPHILS ABSOLUTE: 0.26 E9/L (ref 0.05–0.5)
EOSINOPHILS RELATIVE PERCENT: 6 % (ref 0–6)
GFR SERPL CREATININE-BSD FRML MDRD: >60 ML/MIN/1.73
GLUCOSE BLD-MCNC: 57 MG/DL (ref 74–99)
HCT VFR BLD CALC: 40.8 % (ref 34–48)
HEMOGLOBIN: 12.7 G/DL (ref 11.5–15.5)
IMMATURE GRANULOCYTES #: 0.02 E9/L
IMMATURE GRANULOCYTES %: 0.5 % (ref 0–5)
LYMPHOCYTES ABSOLUTE: 0.71 E9/L (ref 1.5–4)
LYMPHOCYTES RELATIVE PERCENT: 16.3 % (ref 20–42)
MCH RBC QN AUTO: 30.2 PG (ref 26–35)
MCHC RBC AUTO-ENTMCNC: 31.1 % (ref 32–34.5)
MCV RBC AUTO: 96.9 FL (ref 80–99.9)
MONOCYTES ABSOLUTE: 0.44 E9/L (ref 0.1–0.95)
MONOCYTES RELATIVE PERCENT: 10.1 % (ref 2–12)
NEUTROPHILS ABSOLUTE: 2.87 E9/L (ref 1.8–7.3)
NEUTROPHILS RELATIVE PERCENT: 65.7 % (ref 43–80)
PDW BLD-RTO: 15.9 FL (ref 11.5–15)
PLATELET # BLD: 280 E9/L (ref 130–450)
PMV BLD AUTO: 9.5 FL (ref 7–12)
POTASSIUM REFLEX MAGNESIUM: 3.9 MMOL/L (ref 3.5–5)
RBC # BLD: 4.21 E12/L (ref 3.5–5.5)
SODIUM BLD-SCNC: 134 MMOL/L (ref 132–146)
WBC # BLD: 4.4 E9/L (ref 4.5–11.5)

## 2022-12-13 PROCEDURE — 2709999900 HC NON-CHARGEABLE SUPPLY: Performed by: SURGERY

## 2022-12-13 PROCEDURE — 3700000000 HC ANESTHESIA ATTENDED CARE: Performed by: SURGERY

## 2022-12-13 PROCEDURE — 85025 COMPLETE CBC W/AUTO DIFF WBC: CPT

## 2022-12-13 PROCEDURE — 97530 THERAPEUTIC ACTIVITIES: CPT

## 2022-12-13 PROCEDURE — 6370000000 HC RX 637 (ALT 250 FOR IP): Performed by: INTERNAL MEDICINE

## 2022-12-13 PROCEDURE — 88342 IMHCHEM/IMCYTCHM 1ST ANTB: CPT

## 2022-12-13 PROCEDURE — 36415 COLL VENOUS BLD VENIPUNCTURE: CPT

## 2022-12-13 PROCEDURE — 6370000000 HC RX 637 (ALT 250 FOR IP)

## 2022-12-13 PROCEDURE — 97535 SELF CARE MNGMENT TRAINING: CPT

## 2022-12-13 PROCEDURE — 6370000000 HC RX 637 (ALT 250 FOR IP): Performed by: SURGERY

## 2022-12-13 PROCEDURE — 0DB68ZX EXCISION OF STOMACH, VIA NATURAL OR ARTIFICIAL OPENING ENDOSCOPIC, DIAGNOSTIC: ICD-10-PCS | Performed by: SURGERY

## 2022-12-13 PROCEDURE — 88305 TISSUE EXAM BY PATHOLOGIST: CPT

## 2022-12-13 PROCEDURE — 0DB58ZX EXCISION OF ESOPHAGUS, VIA NATURAL OR ARTIFICIAL OPENING ENDOSCOPIC, DIAGNOSTIC: ICD-10-PCS | Performed by: SURGERY

## 2022-12-13 PROCEDURE — 2580000003 HC RX 258

## 2022-12-13 PROCEDURE — 3700000001 HC ADD 15 MINUTES (ANESTHESIA): Performed by: SURGERY

## 2022-12-13 PROCEDURE — 1200000000 HC SEMI PRIVATE

## 2022-12-13 PROCEDURE — 6360000002 HC RX W HCPCS

## 2022-12-13 PROCEDURE — 80048 BASIC METABOLIC PNL TOTAL CA: CPT

## 2022-12-13 PROCEDURE — 7100000001 HC PACU RECOVERY - ADDTL 15 MIN: Performed by: SURGERY

## 2022-12-13 PROCEDURE — 7100000000 HC PACU RECOVERY - FIRST 15 MIN: Performed by: SURGERY

## 2022-12-13 PROCEDURE — 2580000003 HC RX 258: Performed by: INTERNAL MEDICINE

## 2022-12-13 PROCEDURE — 3609012400 HC EGD TRANSORAL BIOPSY SINGLE/MULTIPLE: Performed by: SURGERY

## 2022-12-13 RX ORDER — LIDOCAINE HYDROCHLORIDE 20 MG/ML
INJECTION, SOLUTION INTRAVENOUS PRN
Status: DISCONTINUED | OUTPATIENT
Start: 2022-12-13 | End: 2022-12-13 | Stop reason: SDUPTHER

## 2022-12-13 RX ORDER — MEPERIDINE HYDROCHLORIDE 25 MG/ML
12.5 INJECTION INTRAMUSCULAR; INTRAVENOUS; SUBCUTANEOUS EVERY 5 MIN PRN
Status: DISCONTINUED | OUTPATIENT
Start: 2022-12-13 | End: 2022-12-14

## 2022-12-13 RX ORDER — SODIUM CHLORIDE 9 MG/ML
INJECTION, SOLUTION INTRAVENOUS PRN
Status: DISCONTINUED | OUTPATIENT
Start: 2022-12-13 | End: 2022-12-16 | Stop reason: HOSPADM

## 2022-12-13 RX ORDER — PROPOFOL 10 MG/ML
INJECTION, EMULSION INTRAVENOUS PRN
Status: DISCONTINUED | OUTPATIENT
Start: 2022-12-13 | End: 2022-12-13 | Stop reason: SDUPTHER

## 2022-12-13 RX ORDER — BISACODYL 10 MG
10 SUPPOSITORY, RECTAL RECTAL ONCE
Status: COMPLETED | OUTPATIENT
Start: 2022-12-13 | End: 2022-12-13

## 2022-12-13 RX ORDER — SODIUM CHLORIDE 9 MG/ML
INJECTION, SOLUTION INTRAVENOUS CONTINUOUS PRN
Status: DISCONTINUED | OUTPATIENT
Start: 2022-12-13 | End: 2022-12-13 | Stop reason: SDUPTHER

## 2022-12-13 RX ORDER — SODIUM CHLORIDE 0.9 % (FLUSH) 0.9 %
5-40 SYRINGE (ML) INJECTION PRN
Status: DISCONTINUED | OUTPATIENT
Start: 2022-12-13 | End: 2022-12-16 | Stop reason: HOSPADM

## 2022-12-13 RX ORDER — SUCRALFATE 1 G/1
1 TABLET ORAL
Status: DISCONTINUED | OUTPATIENT
Start: 2022-12-13 | End: 2022-12-16 | Stop reason: HOSPADM

## 2022-12-13 RX ORDER — SODIUM CHLORIDE 0.9 % (FLUSH) 0.9 %
5-40 SYRINGE (ML) INJECTION EVERY 12 HOURS SCHEDULED
Status: DISCONTINUED | OUTPATIENT
Start: 2022-12-13 | End: 2022-12-16 | Stop reason: HOSPADM

## 2022-12-13 RX ORDER — MORPHINE SULFATE 2 MG/ML
1 INJECTION, SOLUTION INTRAMUSCULAR; INTRAVENOUS EVERY 5 MIN PRN
Status: DISCONTINUED | OUTPATIENT
Start: 2022-12-13 | End: 2022-12-14

## 2022-12-13 RX ORDER — MORPHINE SULFATE 2 MG/ML
2 INJECTION, SOLUTION INTRAMUSCULAR; INTRAVENOUS EVERY 5 MIN PRN
Status: DISCONTINUED | OUTPATIENT
Start: 2022-12-13 | End: 2022-12-14

## 2022-12-13 RX ADMIN — SUCRALFATE 1 G: 1 TABLET ORAL at 20:24

## 2022-12-13 RX ADMIN — SODIUM CHLORIDE: 9 INJECTION, SOLUTION INTRAVENOUS at 14:35

## 2022-12-13 RX ADMIN — TRAMADOL HYDROCHLORIDE 50 MG: 50 TABLET, COATED ORAL at 16:11

## 2022-12-13 RX ADMIN — TRAMADOL HYDROCHLORIDE 50 MG: 50 TABLET, COATED ORAL at 09:51

## 2022-12-13 RX ADMIN — TRAMADOL HYDROCHLORIDE 50 MG: 50 TABLET, COATED ORAL at 22:47

## 2022-12-13 RX ADMIN — DOCUSATE SODIUM 100 MG: 100 CAPSULE, LIQUID FILLED ORAL at 08:50

## 2022-12-13 RX ADMIN — LIDOCAINE HYDROCHLORIDE 40 MG: 20 INJECTION, SOLUTION INTRAVENOUS at 14:42

## 2022-12-13 RX ADMIN — BISACODYL 10 MG: 10 SUPPOSITORY RECTAL at 08:50

## 2022-12-13 RX ADMIN — SODIUM CHLORIDE, PRESERVATIVE FREE 10 ML: 5 INJECTION INTRAVENOUS at 08:54

## 2022-12-13 RX ADMIN — TRAMADOL HYDROCHLORIDE 50 MG: 50 TABLET, COATED ORAL at 03:54

## 2022-12-13 RX ADMIN — ACETAMINOPHEN 650 MG: 325 TABLET ORAL at 17:36

## 2022-12-13 RX ADMIN — AMLODIPINE BESYLATE 5 MG: 5 TABLET ORAL at 08:50

## 2022-12-13 RX ADMIN — SUCRALFATE 1 G: 1 TABLET ORAL at 16:55

## 2022-12-13 RX ADMIN — PROPOFOL 100 MG: 10 INJECTION, EMULSION INTRAVENOUS at 14:42

## 2022-12-13 ASSESSMENT — PAIN SCALES - GENERAL
PAINLEVEL_OUTOF10: 4
PAINLEVEL_OUTOF10: 0
PAINLEVEL_OUTOF10: 7
PAINLEVEL_OUTOF10: 0
PAINLEVEL_OUTOF10: 10

## 2022-12-13 ASSESSMENT — PAIN DESCRIPTION - DESCRIPTORS
DESCRIPTORS: ACHING;DISCOMFORT;SORE
DESCRIPTORS: SHARP;SHOOTING;DISCOMFORT

## 2022-12-13 ASSESSMENT — PAIN DESCRIPTION - LOCATION
LOCATION: BACK;HEAD
LOCATION: BACK;ABDOMEN
LOCATION: HEAD
LOCATION: HEAD;BACK
LOCATION: BACK
LOCATION: BACK

## 2022-12-13 ASSESSMENT — PAIN DESCRIPTION - ORIENTATION
ORIENTATION: RIGHT;LEFT
ORIENTATION: LOWER

## 2022-12-13 ASSESSMENT — LIFESTYLE VARIABLES: SMOKING_STATUS: 0

## 2022-12-13 ASSESSMENT — PAIN SCALES - WONG BAKER: WONGBAKER_NUMERICALRESPONSE: 2

## 2022-12-13 NOTE — PROGRESS NOTES
GENERAL SURGERY  DAILY PROGRESS NOTE  12/13/2022    CHIEF COMPLAINT:  Chief Complaint   Patient presents with    Dysphagia     Patient called ambulance for back pain because she thinks she broke another bone in her back, now she states she is having difficulty since yesterday. SUBJECTIVE:  Patient was unable to finish her colonoscopy prep yesterday. She has not had a bowel movement either. Complains of dry mouth and trouble swallowing. No nausea or emesis overnight. OBJECTIVE:  /81   Pulse 96   Temp 97.4 °F (36.3 °C) (Temporal)   Resp 16   Ht 5' 4\" (1.626 m)   Wt 115 lb (52.2 kg)   SpO2 97%   BMI 19.74 kg/m²     GENERAL: Answers questions appropriately  LUNGS:  No increased work of breathing on room air  CARDIOVASCULAR: Hemodynamically stable  ABDOMEN: Soft and nontender. No rebound or rigidity. No obvious distention.   SKIN: Warm, without jaundice    ASSESSMENT/PLAN:  67 y.o. female recent weight loss, dysphagia and constipation    Plan  - Speech recommends continue clears  - Current bowel regimen with lactulose, senna, and GlycoLax   - EGD today  - okay to return to clear diet after procedure form a surgery standpoint     Plan discussed with Dr. Edward Norman, DO  Surgery Resident PGY-1  12/13/2022  7:23 AM

## 2022-12-13 NOTE — OP NOTE
Operative Note      Patient: Trent Valdez  YOB: 1950  MRN: [id number]    Date of Procedure: 12/13/2022    Pre-Op Diagnosis: Dysphagia, unspecified type [R13.10]    Post-Op Diagnosis:     Type I hiatal hernia with reflux changes with possible Aleman's esophagus  Both gastric and duodenal ulcerations       Procedure(s):  EGD BIOPSY    Surgeon(s):  Polo Castro MD    Assistant:   * No surgical staff found *    Anesthesia: Monitor Anesthesia Care    Estimated Blood Loss (mL):     Complications: None    Specimens:   ID Type Source Tests Collected by Time Destination   A : antrum biopsy r/o h pylori Tissue Tissue SURGICAL PATHOLOGY Polo Castro MD 12/13/2022 1445    B : esophageal biopsy r/o Barretts Tissue Tissue SURGICAL PATHOLOGY Polo Castro MD 12/13/2022 1447        Implants:  * No implants in log *      Drains:   [REMOVED] Urinary Catheter 12/03/22 [place] (Removed)   $ Urethral catheter insertion $ Not inserted for procedure 12/03/22 2305   Catheter Indications Urinary retention (acute or chronic), continuous bladder irrigation or bladder outlet obstruction 12/03/22 2305   Site Assessment [place] 12/03/22 2305   Urine Color Yellow 12/04/22 0726   Urine Appearance Clear 12/04/22 0726   Collection Container Standard 12/03/22 2305   Securement Method Securing device (Describe) 12/03/22 2305   Catheter Care Completed Yes 12/03/22 2305   Catheter Best Practices  Drainage tube clipped to bed;Catheter secured to thigh; Tamper seal intact; Bag below bladder;Drainage bag less than half full;Lack of dependent loop in tubing;Bag not on floor 12/03/22 2305   Status Draining 12/03/22 2305   Output (mL) 300 mL 12/04/22 1100       [REMOVED] External Urinary Catheter (Removed)   Site Assessment Clean,dry & intact 12/04/22 2229   Placement Initiated 12/04/22 2229   Perineal Care Yes 12/04/22 2229   Suction 40 mmgHg continuous 12/04/22 2229   Urine Color Yellow 12/05/22 0548   Urine Appearance Clear 12/05/22 5294   Output (mL) 200 mL 12/05/22 0548       Findings: As above    Detailed Description of Procedure: The patient was brought to the endoscopy suite and placed on the table in the left lateral decubitus position. The patient received anesthesia per the department of anesthesiology. A bite-block was placed. The endoscope was passed through the lumen of the esophagus, stomach and duodenum. The endoscope was retrieved. The patient had a large duodenal ulcer. No active bleeding was noted. Upon reentry to the stomach, the patient had multiple gastric ulcerations. The retroflexed view did reveal a small type I hiatal hernia. The diaphragm was marked at 35 cm from the incisors and the GE junction at 30 cm from the incisors. The remainder of the examination was uneventful. The patient had biopsies of the distal esophagus under narrowband imaging. The patient had changes consistent with reflux disease and possible Aleman's esophagus.     Assessment:    Dysphagia likely related to hiatal hernia with reflux disease and possible Aleman's esophagus    Plan:    Proton pump inhibitor  Carafate  Dietary restrictions    Electronically signed by Christiano Jean MD on 12/13/2022 at 2:56 PM

## 2022-12-13 NOTE — PROGRESS NOTES
Physical Therapy  Physical Therapy Treatment    Name: Abraham Zaman  : 1950  MRN: [id number]      Date of Service: 2022    Evaluating PT:  Manuel Pacheco PT, DPT [id number]    Room #:  0111/5120-I  Diagnosis:  Odynophagia [R13.10]  Dysphagia [R13.10]  Lumbar compression fracture, closed, initial encounter ([facility] [S32.000A]  Dysphagia, unspecified type [R13.10]  Compression fracture of thoracic vertebra, unspecified thoracic vertebral level, [facility] [S22.000A]  PMHx/PSHx:  Osteoporosis, HTN, vertebral compression fracture  Procedure/Surgery:  None  Precautions:  Falls, spinal precautions, TLSO  Equipment Needs:  FWW; TBD  Equipment Owned:  SPC, standard walker    SUBJECTIVE:    Pt lives with  in a 1 story home with 4 stair(s) to enter and 1 rail(s). Bed is on 1st floor and bath is on 1st floor. Pt ambulated using SPC with assistance as needed PTA. OBJECTIVE:   Initial Evaluation  Date: 2022 Treatment  2022 Short Term/ Long Term   Goals   AM-PAC 6 Clicks 67/52 63/76    Was pt agreeable to Eval/treatment? Yes Yes    Does pt have pain? 7/10 abdomen, back, headache Yes; \"everything\"    Bed Mobility  Rolling: SBA  Supine to sit: SBA  Sit to supine: NT  Scooting: NT Rolling: SBA  Supine to sit: ModA  Sit to supine: NT  Scooting: NT Rolling: Independent  Supine to sit: Independent  Sit to supine: Independent  Scooting: Independent   Transfers Sit to stand: SBA  Stand to sit: Priya  Stand pivot: NT Sit to stand: Priya  Stand to sit: Priya  Stand pivot: NT Sit to stand: Mod I  Stand to sit: Mod I  Stand pivot: Mod I with AAD   Ambulation    15 feet with SPC + HHA Priya  50 feet with [employer] [place] 40 feet with [employer] Priya 150 feet with AAD Mod I   Stair negotiation: ascended and descended  NT NT 4 step(s) with 1 rail(s) Mod I   ROM BUE:  See OT note  BLE:  WFL     Strength BUE:  See OT note  BLE:  Grossly 5/5     Balance Sitting EOB:  SBA  Dynamic Standing:  Priya with SPC + HHA;  Priya with [facility] Sitting EOB:  SBA  Dynamic Standing:  Priya with [facility] Sitting EOB:  Independent  Dynamic Standing: Mod I with AAD     Pt is A & O x 4  Sensation:  No reports of numbness/tingling to extremities  Edema:  Unremarkable    Patient education  Pt educated on safety during functional mobility, use of call light for assistance. Patient response to education:   Pt verbalized understanding Pt demonstrated skill Pt requires further education in this area   Yes Yes Yes     ASSESSMENT:    Comments:  Session cleared by nursing. Patient in semi-Garza's position upon arrival; agreeable to PT session with OT collaboration. Rolling performed in order to don TLSO in supine prior to mobilization. Required increased time, assistance of trunk to perform bed mobility via log roll technique. Patient sat EOB for extended period of time. Required increased time, verbal cues related to positioning/sequencing to ensure safety during functional transfers. Ambulated with decreased speed. Following seated rest break, performed sit <> stand transfer from bedside chair. Patient left sitting in bedside chair with TLSO donned, call light in reach. Instructed not to get up on own and to use call light for assistance; expressed understanding. Treatment:  Patient practiced and was instructed in the following treatment:    Bed mobility - verbal cues to facilitate proper positioning and sequencing regarding log roll technique; physical assistance provided as needed during activity  Static sitting - performed to promote upright tolerance and balance maintenance  Functional transfers - verbal cues to facilitate proper positioning and sequencing, particularly related to hand/foot placement; physical assistance provided as needed during activity  Ambulation - physical assistance provided as needed during activity    PLAN:    Patient is making fair progress towards established goals. Will continue with current POC.       Time in  1108  Time out 1133    Total Treatment Time  25 minutes     CPT codes:  [] Gait training 70538 0 minutes  [] Manual therapy 49220 0 minutes  [x] Therapeutic activities 37805 25 minutes  [] Therapeutic exercises 08844 0 minutes  [] Neuromuscular reeducation 36001 0 minutes    Carolyne Gan, PT, DPT  [id number]

## 2022-12-13 NOTE — ANESTHESIA POSTPROCEDURE EVALUATION
Department of Anesthesiology  Postprocedure Note    Patient: Laura Murphy  MRN: [id number]  YOB: 1950  Date of evaluation: 12/13/2022      Procedure Summary     Date: 12/13/22 Room / Location: [address] [place] [address] / [facility]    Anesthesia Start: 9611 Anesthesia Stop: 1454    Procedure: EGD BIOPSY Diagnosis:       Dysphagia, unspecified type      (Dysphagia, unspecified type [R13.10])    Surgeons: Juan Luis Ray MD Responsible Provider: Naz Walls MD    Anesthesia Type: MAC ASA Status: 3          Anesthesia Type: No value filed.     John Phase I: John Score: 10    John Phase II:        Anesthesia Post Evaluation    Patient location during evaluation: PACU  Patient participation: complete - patient participated  Level of consciousness: awake  Pain score: 3  Airway patency: patent  Nausea & Vomiting: no nausea and no vomiting  Complications: no  Cardiovascular status: blood pressure returned to baseline  Respiratory status: acceptable  Hydration status: euvolemic

## 2022-12-13 NOTE — ANESTHESIA PRE PROCEDURE
Department of Anesthesiology  Preprocedure Note       Name:  Humble Briggs   Age:  67 y.o.  :  1950                                          MRN:  [id number]         Date:  2022      Surgeon: Piper Guzmán):  Dereck Badillo MD    Procedure: Procedure(s):  EGD BIOPSY    Medications prior to admission:   Prior to Admission medications    Medication Sig Start Date End Date Taking?  Authorizing Provider   pantoprazole (PROTONIX) 40 MG tablet Take 1 tablet by mouth every morning (before breakfast) 22   ENRICO Cesar - CNP   docusate sodium (COLACE, DULCOLAX) 100 MG CAPS Take 100 mg by mouth 2 times daily 22   Sandra Mata, DO   VITAMIN D PO Take 1 Dose by mouth every other day    Historical Provider, MD   amLODIPine (NORVASC) 5 MG tablet Take 5 mg by mouth daily    Historical Provider, MD       Current medications:    Current Facility-Administered Medications   Medication Dose Route Frequency Provider Last Rate Last Admin    traMADol (ULTRAM) tablet 50 mg  50 mg Oral Q6H PRN Sandra Hannamer, DO   50 mg at 22 0951    amLODIPine (NORVASC) tablet 5 mg  5 mg Oral Daily Sandra Hannamer, DO   5 mg at 22 0850    docusate sodium (COLACE) capsule 100 mg  100 mg Oral BID Sandra Baeza Malmer, DO   100 mg at 22 0850    pantoprazole (PROTONIX) tablet 40 mg  40 mg Oral QAM AC Shailesh Mata, DO        sodium chloride flush 0.9 % injection 5-40 mL  5-40 mL IntraVENous 2 times per day Sandra Mata, DO   10 mL at 22 0854    sodium chloride flush 0.9 % injection 5-40 mL  5-40 mL IntraVENous PRN Sandra Laneo Jacquesmer, DO        0.9 % sodium chloride infusion   IntraVENous PRN Sandra Mata, DO        enoxaparin (LOVENOX) injection 40 mg  40 mg SubCUTAneous Daily Sandra [place] Malmer, DO   40 mg at 22 1309    ondansetron (ZOFRAN-ODT) disintegrating tablet 4 mg  4 mg Oral Q8H PRN Sandra Mata, DO        Or    ondansetron [facility]) injection 4 mg  4 mg IntraVENous Q6H PRN Lourena Chatters Malmer, DO        acetaminophen (TYLENOL) tablet 650 mg  650 mg Oral Q6H PRN Lourena Chatters Malmer, DO   650 mg at 12/12/22 2310    Or    acetaminophen (TYLENOL) suppository 650 mg  650 mg Rectal Q6H PRN Lourena Chatters Malmer, DO        polyethylene glycol [facility]) packet 17 g  17 g Oral Daily Atul Bagent, DO   17 g at 12/12/22 0926    senna (SENOKOT) tablet 8.6 mg  1 tablet Oral Nightly Atul Bagent, DO   8.6 mg at 12/11/22 2040    0.9 % sodium chloride infusion   IntraVENous Continuous Vaibhav Balderas  mL/hr at 12/11/22 0442 New Bag at 12/11/22 0442       Allergies: Allergies   Allergen Reactions    Latex        Problem List:    Patient Active Problem List   Diagnosis Code    [facility] S32.000A    Constipation K59.00    Unable to care for self Z78.9    Dysphagia R13.10       Past Medical History:        Diagnosis Date    Bruising tendency     Hyperlipidemia     Hypertension     Osteoporosis        Past Surgical History:  History reviewed. No pertinent surgical history. Social History:    Social History     Tobacco Use    Smoking status: Never    Smokeless tobacco: Never   Substance Use Topics    Alcohol use: Never                                Counseling given: Not Answered      Vital Signs (Current):   Vitals:    12/13/22 0113 12/13/22 0424 12/13/22 0845 12/13/22 1021   BP: 134/81  131/72    Pulse: 96  89    Resp: 16 16 16 16   Temp:   36.4 °C (97.6 °F)    TempSrc:   Temporal    SpO2: 97%  94%    Weight:       Height:                                                  BP Readings from Last 3 Encounters:   12/13/22 131/72   12/06/22 126/78   12/05/22 127/67       NPO Status:                                                                                 BMI:   Wt Readings from Last 3 Encounters:   12/10/22 115 lb (52.2 kg)   12/06/22 116 lb (52.6 kg)   12/05/22 116 lb (52.6 kg)     Body mass index is 19.74 kg/m².     CBC:   Lab Results   Component Value Date/Time    WBC 4.4 12/13/2022 07:02 AM    RBC 4.21 12/13/2022 07:02 AM    HGB 12.7 12/13/2022 07:02 AM    HCT 40.8 12/13/2022 07:02 AM    MCV 96.9 12/13/2022 07:02 AM    RDW 15.9 12/13/2022 07:02 AM     12/13/2022 07:02 AM       CMP:   Lab Results   Component Value Date/Time     12/13/2022 07:02 AM    K 3.9 12/13/2022 07:02 AM     12/13/2022 07:02 AM    CO2 16 12/13/2022 07:02 AM    BUN 2 12/13/2022 07:02 AM    CREATININE 0.4 12/13/2022 07:02 AM    GFRAA >60 11/14/2015 04:10 AM    LABGLOM >60 12/13/2022 07:02 AM    GLUCOSE 57 12/13/2022 07:02 AM    PROT 7.2 12/10/2022 01:34 PM    CALCIUM 9.1 12/13/2022 07:02 AM    BILITOT 0.3 12/10/2022 01:34 PM    ALKPHOS 146 12/10/2022 01:34 PM    AST 22 12/10/2022 01:34 PM    ALT 15 12/10/2022 01:34 PM       POC Tests: No results for input(s): POCGLU, POCNA, POCK, POCCL, POCBUN, POCHEMO, POCHCT in the last 72 hours.     Coags:   Lab Results   Component Value Date/Time    PROTIME 10.9 11/14/2015 04:10 AM    INR 1.0 11/14/2015 04:10 AM    APTT 28.8 11/14/2015 04:10 AM       HCG (If Applicable): No results found for: PREGTESTUR, PREGSERUM, HCG, HCGQUANT     ABGs: No results found for: PHART, PO2ART, XVI2YYU, SPA4XXR, BEART, U7XMFNNE     Type & Screen (If Applicable):  No results found for: LABABO, LABRH    Drug/Infectious Status (If Applicable):  No results found for: HIV, HEPCAB    COVID-19 Screening (If Applicable): No results found for: COVID19    EKG - 12/4/22  Narrative & Impression    Sinus tachycardia  Low voltage QRS  Septal infarct , age undetermined  Abnormal ECG  No previous ECGs available  Confirmed by Carlo Rubio ([id number]) on 12/4/2022 2:27:16 PM                 CT lumbar spine.       Comparison 11/09/2022       Findings       There is significant compression deformity of L1, L2 and L3 with loss of   height ranging from but 30-50% with progressive compression of L2.  There is   mild retropulsion of the bony fragments at L1  ,L2.  There is osteopenia and   degenerative changes in the lumbar spine.       Impression       Diffuse compression fractures of L1 ,L2 and L3 as noted with progressive   compression of L2.  There is retropulsion of the bony fragments at L1 and L2.             Anesthesia Evaluation   no history of anesthetic complications (pt denies previous anesthesia / no family hx): Airway: Mallampati: II  TM distance: >3 FB   Neck ROM: limited  Mouth opening: > = 3 FB   Dental:          Pulmonary:Negative Pulmonary ROS       (-) not a current smoker                           Cardiovascular:  Exercise tolerance: poor (<4 METS),   (+) hypertension:, hyperlipidemia      ECG reviewed               Beta Blocker:  Not on Beta Blocker        PE comment: ST   Neuro/Psych:   (+) headaches: tension headaches,              ROS comment: Complaining of headache x 2 days  Per pt - broken bones to left back - CT reviewed - Diffuse compression fractures of L1 ,L2 and L3 as noted with progressive  compression of L2.  There is retropulsion of the bony fragments at L1 and L2.   GI/Hepatic/Renal:   (+) GERD: no interval change,      Bowel prep: refused. Endo/Other:    (+) : arthritis:., .                 Abdominal:             Vascular: negative vascular ROS. Other Findings:           Anesthesia Plan      MAC     ASA 3       Induction: intravenous. Anesthetic plan and risks discussed with patient. Plan discussed with attending. ENRICO Martinez - CRNA   12/13/2022      Pt seen, examined, chart reviewed, plan discussed.   Joo Cain MD  12/13/2022  3:24 PM

## 2022-12-13 NOTE — PROGRESS NOTES
Hospital Medicine    Subjective:  pt alert conversive unable to drink golytely      Current Facility-Administered Medications:     bisacodyl (DULCOLAX) suppository 10 mg, 10 mg, Rectal, Once, Garrett López DO    traMADol (ULTRAM) tablet 50 mg, 50 mg, Oral, Q6H PRN, Grant Mata DO, 50 mg at 12/13/22 0354    amLODIPine (NORVASC) tablet 5 mg, 5 mg, Oral, Daily, Grant Mata DO    docusate sodium (COLACE) capsule 100 mg, 100 mg, Oral, BID, Grant Mata DO, 100 mg at 12/12/22 4920    pantoprazole (PROTONIX) tablet 40 mg, 40 mg, Oral, QAM AC, Grant Mata DO    sodium chloride flush 0.9 % injection 5-40 mL, 5-40 mL, IntraVENous, 2 times per day, Manju Chavez, DO, 10 mL at 12/11/22 1005    sodium chloride flush 0.9 % injection 5-40 mL, 5-40 mL, IntraVENous, PRN, Grant Mata DO    0.9 % sodium chloride infusion, , IntraVENous, PRN, Grant Mata DO    enoxaparin (LOVENOX) injection 40 mg, 40 mg, SubCUTAneous, Daily, Grant Mata DO, 40 mg at 12/12/22 1309    ondansetron (ZOFRAN-ODT) disintegrating tablet 4 mg, 4 mg, Oral, Q8H PRN **OR** ondansetron (ZOFRAN) injection 4 mg, 4 mg, IntraVENous, Q6H PRN, Grant Mata DO    acetaminophen (TYLENOL) tablet 650 mg, 650 mg, Oral, Q6H PRN, 650 mg at 12/12/22 2310 **OR** acetaminophen (TYLENOL) suppository 650 mg, 650 mg, Rectal, Q6H PRN, Grant Mata DO    polyethylene glycol (GLYCOLAX) packet 17 g, 17 g, Oral, Daily, Atul Bagent, DO, 17 g at 12/12/22 0926    senna (SENOKOT) tablet 8.6 mg, 1 tablet, Oral, Nightly, Atul Bagent, DO, 8.6 mg at 12/11/22 2040    0.9 % sodium chloride infusion, , IntraVENous, Continuous, Devyn Castillo MD, Last Rate: 100 mL/hr at 12/11/22 0442, New Bag at 12/11/22 0442    Objective:    /81   Pulse 96   Temp 97.4 °F (36.3 °C) (Temporal)   Resp 16   Ht 5' 4\" (1.626 m)   Wt 115 lb (52.2 kg)   SpO2 97%   BMI 19.74 kg/m²     Heart:  reg  Lungs:  ctab  Abd: + bs nondistended  Extrem:  w/o edema    CBC with Differential:    Lab Results   Component Value Date/Time    WBC 7.1 12/10/2022 01:34 PM    RBC 5.22 12/10/2022 01:34 PM    HGB 15.6 12/10/2022 01:34 PM    HCT 47.9 12/10/2022 01:34 PM     12/10/2022 01:34 PM    MCV 91.8 12/10/2022 01:34 PM    MCH 29.9 12/10/2022 01:34 PM    MCHC 32.6 12/10/2022 01:34 PM    RDW 15.6 12/10/2022 01:34 PM    LYMPHOPCT 8.3 12/10/2022 01:34 PM    MONOPCT 7.7 12/10/2022 01:34 PM    BASOPCT 0.7 12/10/2022 01:34 PM    MONOSABS 0.55 12/10/2022 01:34 PM    LYMPHSABS 0.59 12/10/2022 01:34 PM    EOSABS 0.04 12/10/2022 01:34 PM    BASOSABS 0.05 12/10/2022 01:34 PM     CMP:    Lab Results   Component Value Date/Time     12/13/2022 07:02 AM    K 3.9 12/13/2022 07:02 AM     12/13/2022 07:02 AM    CO2 16 12/13/2022 07:02 AM    BUN 2 12/13/2022 07:02 AM    CREATININE 0.4 12/13/2022 07:02 AM    GFRAA >60 11/14/2015 04:10 AM    LABGLOM >60 12/13/2022 07:02 AM    GLUCOSE 57 12/13/2022 07:02 AM    PROT 7.2 12/10/2022 01:34 PM    LABALBU 3.8 12/10/2022 01:34 PM    CALCIUM 9.1 12/13/2022 07:02 AM    BILITOT 0.3 12/10/2022 01:34 PM    ALKPHOS 146 12/10/2022 01:34 PM    AST 22 12/10/2022 01:34 PM    ALT 15 12/10/2022 01:34 PM     Warfarin PT/INR:    Lab Results   Component Value Date    INR 1.0 11/14/2015    PROTIME 10.9 11/14/2015       Assessment:    Principal Problem:    Dysphagia  Resolved Problems:    * No resolved hospital problems.  *      Plan:  Egd today        Evan Lyon DO  8:36 AM  12/13/2022

## 2022-12-13 NOTE — PROGRESS NOTES
Occupational Therapy  OT BEDSIDE TREATMENT NOTE   [address] [facility] [address] [address]:  Patient Name: Sera Bland  MRN: [id number]  : 1950  Room: 5405403-B     Referring Provider: Damian Marshall DO  Specific Provider Orders/Date: OT evaluation and treat 22     Evaluating OT: Franklin Rocha, OTR/L #[id number]     Diagnosis: Odynophagia [R13.10]  Dysphagia [R13.10]  Lumbar compression fracture, closed, initial encounter ([facility] [S32.000A]  Dysphagia, unspecified type [R13.10]  Compression fracture of thoracic vertebra, unspecified thoracic vertebral level, initial encounter [facility]) [S22.000A]       Surgery:   Past Surgical History   History reviewed. No pertinent surgical history. Pertinent Medical History:  has a past medical history of Bruising tendency, Hyperlipidemia, Hypertension, and Osteoporosis.       Precautions:  Fall Risk, TLSO brace, spinal, [place]     Assessment of current deficits   [x] Functional mobility             [x]ADLs           [] Strength                  []Cognition   [x] Functional transfers           [x] IADLs         [x] Safety Awareness   [x]Endurance   [] Fine Coordination              [x] Balance      [] Vision/perception   []Sensation     []Gross Motor Coordination  [] ROM           [] Delirium                   [] Motor Control      OT PLAN OF CARE   OT POC based on physician orders, patient diagnosis and results of clinical assessment     Frequency/Duration   2-4 days/wk for 1 week PRN   Specific OT Treatment Interventions to include:   * Instruction/training on adapted ADL techniques and AE recommendations to increase functional independence within precautions       * Training on energy conservation strategies, correct breathing pattern and techniques to improve independence/tolerance for self-care routine  * Functional transfer/mobility training/DME recommendations for increased independence, safety, and fall prevention  * Patient/Family education to increase follow through with safety techniques and functional independence  * Recommendation of environmental modifications for increased safety with functional transfers/mobility and ADLs  * Therapeutic activities to facilitate/challenge dynamic balance, stand tolerance for increased safety and independence with ADLs  * Therapeutic activities to facilitate gross/fine motor skills for increased independence with ADLs  * Positioning to improve skin integrity, interaction with environment and functional independence     Recommended Adaptive Equipment: ww, AE prn, shower seat     Home Living: Pt lives with   in a one story home with 4 steps and one hand rails. Bed and bath on main  floor.   Family/ Outside assistance available:    Bathroom setup: tub shower   Equipment owned: std walker, spc     Prior Level of Function: assisted as needed patient sponge bathed since fall  with ADLs , assisted with IADLs; ambulated spc and std walker  Driving: no  Occupation: retired  Medication management: self  Leisure: [employer] and going out to eat     Pain Level: 7/10 back , head and abdominal pain    Cognition: A&O: 4/4; Follows one step directions  with increased volume              Memory:  fair               Sequencing:  fair              Problem solving:  fair              Judgement/safety:  fair                Functional Assessment:  AM-PAC Daily Activity Raw Score: 17/24    Initial Eval Status  Date: 12/12/22 Treatment Status  Date: 12/13/22 STGs = LTGs  Time frame: 1-3days   Feeding Liquids able to open packages  set up Mod I    Grooming SBA  SBA  To comb hair seated Mod I    UB Dressing Mod A for TLSO brace donning in supine Max A  To don TLSO supine rolling side to side  Mod I    LB Dressing Mod A to don pants and undergarment in supine  Min A  To don underwear supine bringing legs upward and rolling to bring over hips Mod I with AE prn Bathing Simulated mod A  Mod A  Simulated seated  Mod I seated with LHS   Toileting Mod A incontinent in undergarment/ purr wick  Min A- clothing management Mod I with ww    Bed Mobility  Supine to sit: SBA   Sit to supine:  SBA  Rolling min A  Mod A- supine>sit  Educated pt on technique to increase independence. Supine to sit: mod I log rolling  Sit to supine: mod I   Functional Transfers Sit to stand min A with spc and needing increased support, min A with ww seated EOB /81  SBA- sit<->stand  Cuing for hand placement    Mod I with ww   Functional Mobility Min A in room short distance with increased fatigue and thought of fainting   SBA  Home distance using w/w Mod I with ww   Balance Sitting:     Static:  SBA    Dynamic:CGA  Standing: min A   Sitting:     Static:  SBA    Dynamic: SBA  Standing: SBA                                                                      Activity Tolerance Fair limited by pain and fatigue O2 98%  Fair-  Good for ADL completion   Visual/  Perceptual Glasses: yes reading           Safety fair  Fair                                 Good with spinal precautions and TLSO brace wear and follow through       Comments: Upon arrival pt supine in bed. Pt educated on techniques to increase independence and safety during ADL's, bed mobility, and functional transfers. At end of session pt left seated in bedside chair, call light within reach,  present. Pt has made fair progress towards set goals.      Continue with current plan of care    Treatment Time In: 11:08            Treatment Time Out: 11:33             Treatment Charges: Mins Units   Ther Ex  [id number]     Manual Therapy [id number]     Thera Activities 69944 10 1   ADL/Home Mgt 05332 15 1   Neuro Re-ed 22256     Group Therapy      Orthotic manage/training  [id number]     Non-Billable Time     Total Timed Treatment 25 2     Ivan Wesley

## 2022-12-14 ENCOUNTER — APPOINTMENT (OUTPATIENT)
Dept: CT IMAGING | Age: 72
DRG: 384 | End: 2022-12-14
Payer: MEDICARE

## 2022-12-14 PROBLEM — E44.0 MODERATE PROTEIN-CALORIE MALNUTRITION (HCC): Chronic | Status: ACTIVE | Noted: 2022-12-14

## 2022-12-14 PROCEDURE — 6370000000 HC RX 637 (ALT 250 FOR IP): Performed by: SURGERY

## 2022-12-14 PROCEDURE — 2580000003 HC RX 258: Performed by: ANESTHESIOLOGY

## 2022-12-14 PROCEDURE — 97530 THERAPEUTIC ACTIVITIES: CPT

## 2022-12-14 PROCEDURE — 1200000000 HC SEMI PRIVATE

## 2022-12-14 PROCEDURE — 6370000000 HC RX 637 (ALT 250 FOR IP): Performed by: INTERNAL MEDICINE

## 2022-12-14 PROCEDURE — 6370000000 HC RX 637 (ALT 250 FOR IP): Performed by: STUDENT IN AN ORGANIZED HEALTH CARE EDUCATION/TRAINING PROGRAM

## 2022-12-14 PROCEDURE — 6360000002 HC RX W HCPCS: Performed by: SURGERY

## 2022-12-14 PROCEDURE — 70450 CT HEAD/BRAIN W/O DYE: CPT

## 2022-12-14 RX ORDER — PANTOPRAZOLE SODIUM 40 MG/1
40 TABLET, DELAYED RELEASE ORAL
Status: DISCONTINUED | OUTPATIENT
Start: 2022-12-14 | End: 2022-12-16 | Stop reason: HOSPADM

## 2022-12-14 RX ORDER — LACTULOSE 10 G/15ML
20 SOLUTION ORAL 3 TIMES DAILY
Status: COMPLETED | OUTPATIENT
Start: 2022-12-14 | End: 2022-12-15

## 2022-12-14 RX ADMIN — DOCUSATE SODIUM 100 MG: 100 CAPSULE, LIQUID FILLED ORAL at 10:01

## 2022-12-14 RX ADMIN — PANTOPRAZOLE SODIUM 40 MG: 40 TABLET, DELAYED RELEASE ORAL at 10:00

## 2022-12-14 RX ADMIN — LACTULOSE 20 G: 20 SOLUTION ORAL at 21:47

## 2022-12-14 RX ADMIN — ACETAMINOPHEN 650 MG: 325 TABLET ORAL at 09:59

## 2022-12-14 RX ADMIN — SENNOSIDES 8.6 MG: 8.6 TABLET, FILM COATED ORAL at 21:46

## 2022-12-14 RX ADMIN — TRAMADOL HYDROCHLORIDE 50 MG: 50 TABLET, COATED ORAL at 16:51

## 2022-12-14 RX ADMIN — ACETAMINOPHEN 650 MG: 325 TABLET ORAL at 21:46

## 2022-12-14 RX ADMIN — TRAMADOL HYDROCHLORIDE 50 MG: 50 TABLET, COATED ORAL at 23:19

## 2022-12-14 RX ADMIN — SUCRALFATE 1 G: 1 TABLET ORAL at 13:16

## 2022-12-14 RX ADMIN — LACTULOSE 20 G: 20 SOLUTION ORAL at 10:01

## 2022-12-14 RX ADMIN — SODIUM CHLORIDE, PRESERVATIVE FREE 10 ML: 5 INJECTION INTRAVENOUS at 21:52

## 2022-12-14 RX ADMIN — SUCRALFATE 1 G: 1 TABLET ORAL at 21:57

## 2022-12-14 RX ADMIN — ONDANSETRON 4 MG: 4 TABLET, ORALLY DISINTEGRATING ORAL at 10:02

## 2022-12-14 RX ADMIN — PANTOPRAZOLE SODIUM 40 MG: 40 TABLET, DELAYED RELEASE ORAL at 02:17

## 2022-12-14 RX ADMIN — DOCUSATE SODIUM 100 MG: 100 CAPSULE, LIQUID FILLED ORAL at 21:47

## 2022-12-14 RX ADMIN — ENOXAPARIN SODIUM 40 MG: 100 INJECTION SUBCUTANEOUS at 10:03

## 2022-12-14 RX ADMIN — TRAMADOL HYDROCHLORIDE 50 MG: 50 TABLET, COATED ORAL at 09:55

## 2022-12-14 RX ADMIN — AMLODIPINE BESYLATE 5 MG: 5 TABLET ORAL at 09:59

## 2022-12-14 RX ADMIN — PANTOPRAZOLE SODIUM 40 MG: 40 TABLET, DELAYED RELEASE ORAL at 16:49

## 2022-12-14 RX ADMIN — LACTULOSE 20 G: 20 SOLUTION ORAL at 13:21

## 2022-12-14 ASSESSMENT — PAIN DESCRIPTION - LOCATION
LOCATION: ABDOMEN
LOCATION: BACK
LOCATION: OTHER (COMMENT)

## 2022-12-14 ASSESSMENT — PAIN DESCRIPTION - PAIN TYPE
TYPE: ACUTE PAIN
TYPE: CHRONIC PAIN
TYPE: ACUTE PAIN
TYPE: ACUTE PAIN

## 2022-12-14 ASSESSMENT — PAIN SCALES - GENERAL
PAINLEVEL_OUTOF10: 0
PAINLEVEL_OUTOF10: 0
PAINLEVEL_OUTOF10: 6
PAINLEVEL_OUTOF10: 0
PAINLEVEL_OUTOF10: 10
PAINLEVEL_OUTOF10: 7
PAINLEVEL_OUTOF10: 0
PAINLEVEL_OUTOF10: 10
PAINLEVEL_OUTOF10: 5
PAINLEVEL_OUTOF10: 8

## 2022-12-14 ASSESSMENT — PAIN SCALES - WONG BAKER
WONGBAKER_NUMERICALRESPONSE: 0

## 2022-12-14 ASSESSMENT — PAIN DESCRIPTION - ONSET
ONSET: GRADUAL
ONSET: GRADUAL

## 2022-12-14 ASSESSMENT — PAIN DESCRIPTION - DESCRIPTORS
DESCRIPTORS: ACHING;DISCOMFORT;SORE
DESCRIPTORS: ACHING;DISCOMFORT;SORE
DESCRIPTORS: ACHING
DESCRIPTORS: ACHING;BURNING;SHARP;SPASM
DESCRIPTORS: ACHING;DISCOMFORT;SORE

## 2022-12-14 ASSESSMENT — PAIN - FUNCTIONAL ASSESSMENT
PAIN_FUNCTIONAL_ASSESSMENT: PREVENTS OR INTERFERES SOME ACTIVE ACTIVITIES AND ADLS

## 2022-12-14 ASSESSMENT — PAIN DESCRIPTION - ORIENTATION: ORIENTATION: RIGHT;LEFT

## 2022-12-14 ASSESSMENT — PAIN DESCRIPTION - FREQUENCY
FREQUENCY: INTERMITTENT
FREQUENCY: INTERMITTENT

## 2022-12-14 NOTE — CONSULTS
Comprehensive Nutrition Assessment    Type and Reason for Visit:  Initial, Consult (poor PO/wt loss)    Nutrition Recommendations/Plan:   Continue current diet as tolerated  Start vanilla Ensure BID and vanilla boost once/day to aid in oral intake  Will monitor     Malnutrition Assessment:  Malnutrition Status: Moderate malnutrition (12/14/22 1409)    Context:  Acute Illness (appetite/intake poor for~1 mo / difficulty swallowing)     Findings of the 6 clinical characteristics of malnutrition:  Energy Intake:  75% or less of estimated energy requirements for 7 or more days  Weight Loss:  Unable to assess (d/t lack of wt hx per EMR)     Body Fat Loss:  Mild body fat loss Orbital   Muscle Mass Loss:  Mild muscle mass loss Temples (temporalis), Clavicles (pectoralis & deltoids), Calf (gastrocnemius)  Fluid Accumulation:  No significant fluid accumulation     Strength:  Not Performed    Nutrition Assessment:    pt adm d/t compression frxs and dysphagia; s/p EGD w/ evidence of ulcer's, hiatal hernia, possible Aleman's; PMhx of HTN, HLD; SLP unable to determine dysphagia severity; pt w/ subjective 25# wt loss d/t poor appetite/intake in ~1 mo (unable to confirm wt loss); pt meets criteria for moderate malnutrition; will start ONS and monitor. Nutrition Related Findings:    A&Ox4; mild muscle/fat wasting; constipation; +1/trace edema; I/O WNL Wound Type: None       Current Nutrition Intake & Therapies:    Average Meal Intake: 1-25%, 26-50%  Average Supplements Intake: None Ordered  ADULT DIET; Regular; GI Miner (GERD/Peptic Ulcer)  ADULT ORAL NUTRITION SUPPLEMENT; Breakfast, Dinner; Standard High Calorie/High Protein Oral Supplement  ADULT ORAL NUTRITION SUPPLEMENT; Lunch;  Fortified Pudding Oral Supplement    Anthropometric Measures:  Height: 5' 4\" (162.6 cm)  Ideal Body Weight (IBW): 120 lbs (55 kg)    Admission Body Weight: 116 lb (52.6 kg) (12/4-BS)  Current Body Weight: 118 lb (53.5 kg) (12/14-estimated per RD), 98.3 % IBW. Current BMI (kg/m2): 20.2  Usual Body Weight:  (UTO d/t lack of wt hx per EMR)     Weight Adjustment For: No Adjustment                 BMI Categories: Underweight (BMI less than 22) age over 72    Estimated Daily Nutrient Needs:  Energy Requirements Based On: Formula  Weight Used for Energy Requirements: Admission  Energy (kcal/day): 0133-6668  Weight Used for Protein Requirements: Admission  Protein (g/day): 1.2-1.4g/kgxadm BW=65-75g  Method Used for Fluid Requirements: 1 ml/kcal  Fluid (ml/day): 5462-6354    Nutrition Diagnosis:   Moderate malnutrition, In context of acute illness or injury related to swallowing difficulty (poor intake/difficulty swallowing for ~1 mo) as evidenced by intake 26-50%, intake 0-25%, poor intake prior to admission, swallow study results    Nutrition Interventions:   Food and/or Nutrient Delivery: Continue Current Diet, Start Oral Nutrition Supplement (Vanilla Ensure BID; vanilla boost once/day)  Nutrition Education/Counseling: Education not indicated  Coordination of Nutrition Care: Continue to monitor while inpatient       Goals:     Goals: PO intake 50% or greater, by next RD assessment       Nutrition Monitoring and Evaluation:   Behavioral-Environmental Outcomes: None Identified  Food/Nutrient Intake Outcomes: Food and Nutrient Intake, Supplement Intake  Physical Signs/Symptoms Outcomes: Biochemical Data, Nutrition Focused Physical Findings, Skin, Weight, Chewing or Swallowing, GI Status, Fluid Status or Edema    Discharge Planning:     Too soon to determine     Lizeth Caraballo RD  Contact: [phone]

## 2022-12-14 NOTE — PROGRESS NOTES
Hospital Medicine    Subjective:  pt seen this am c/o headache c/o no bm for several days / egd findings noted      Current Facility-Administered Medications:     pantoprazole (PROTONIX) tablet 40 mg, 40 mg, Oral, BID AC, Faviola Crouch MD, 40 mg at 12/14/22 1649    lactulose (CHRONULAC) 10 GM/15ML solution 20 g, 20 g, Oral, TID, Steven Mata DO, 20 g at 12/14/22 1321    sucralfate (CARAFATE) tablet 1 g, 1 g, Oral, 4x Daily AC & HS, Jyothi Vick MD, 1 g at 12/14/22 1316    sodium chloride flush 0.9 % injection 5-40 mL, 5-40 mL, IntraVENous, 2 times per day, Mega Fabian MD    sodium chloride flush 0.9 % injection 5-40 mL, 5-40 mL, IntraVENous, PRN, Mega Fabian MD    0.9 % sodium chloride infusion, , IntraVENous, PRN, Mega Fabian MD    traMADol [place]) tablet 50 mg, 50 mg, Oral, Q6H PRN, Jyothi Vick MD, 50 mg at 12/14/22 1651    amLODIPine (NORVASC) tablet 5 mg, 5 mg, Oral, Daily, Jyothi Vick MD, 5 mg at 12/14/22 0114    docusate sodium (COLACE) capsule 100 mg, 100 mg, Oral, BID, Jyothi Vick MD, 100 mg at 12/14/22 1001    sodium chloride flush 0.9 % injection 5-40 mL, 5-40 mL, IntraVENous, 2 times per day, Jyothi Vick MD, 10 mL at 12/13/22 0854    sodium chloride flush 0.9 % injection 5-40 mL, 5-40 mL, IntraVENous, PRN, Jyotih Vick MD    0.9 % sodium chloride infusion, , IntraVENous, PRN, Jyothi Vick MD    enoxaparin (LOVENOX) injection 40 mg, 40 mg, SubCUTAneous, Daily, Jyothi Vick MD, 40 mg at 12/14/22 1003    ondansetron (ZOFRAN-ODT) disintegrating tablet 4 mg, 4 mg, Oral, Q8H PRN, 4 mg at 12/14/22 1002 **OR** ondansetron (ZOFRAN) injection 4 mg, 4 mg, IntraVENous, Q6H PRN, Jyothi Vick MD    acetaminophen (TYLENOL) tablet 650 mg, 650 mg, Oral, Q6H PRN, 650 mg at 12/14/22 0959 **OR** acetaminophen (TYLENOL) suppository 650 mg, 650 mg, Rectal, Q6H PRN, Jyothi Vick MD    polyethylene glycol (GLYCOLAX) packet 17 g, 17 g, Oral, Daily, Eligio Perch Royce Aguilar MD, 17 g at 12/12/22 7208    senna (SENOKOT) tablet 8.6 mg, 1 tablet, Oral, Nightly, Chani Jiménez MD, 8.6 mg at 12/11/22 2040    Objective:    /75   Pulse 96   Temp 97.2 °F (36.2 °C) (Temporal)   Resp 19   Ht 5' 4\" (1.626 m)   Wt 118 lb (53.5 kg)   SpO2 94%   BMI 20.25 kg/m²     Heart:  reg  Lungs:  ctab  Abd: + bs soft nontender  Extrem:  w/o edema    CBC with Differential:    Lab Results   Component Value Date/Time    WBC 4.4 12/13/2022 07:02 AM    RBC 4.21 12/13/2022 07:02 AM    HGB 12.7 12/13/2022 07:02 AM    HCT 40.8 12/13/2022 07:02 AM     12/13/2022 07:02 AM    MCV 96.9 12/13/2022 07:02 AM    MCH 30.2 12/13/2022 07:02 AM    MCHC 31.1 12/13/2022 07:02 AM    RDW 15.9 12/13/2022 07:02 AM    LYMPHOPCT 16.3 12/13/2022 07:02 AM    MONOPCT 10.1 12/13/2022 07:02 AM    BASOPCT 1.4 12/13/2022 07:02 AM    MONOSABS 0.44 12/13/2022 07:02 AM    LYMPHSABS 0.71 12/13/2022 07:02 AM    EOSABS 0.26 12/13/2022 07:02 AM    BASOSABS 0.06 12/13/2022 07:02 AM     CMP:    Lab Results   Component Value Date/Time     12/13/2022 07:02 AM    K 3.9 12/13/2022 07:02 AM     12/13/2022 07:02 AM    CO2 16 12/13/2022 07:02 AM    BUN 2 12/13/2022 07:02 AM    CREATININE 0.4 12/13/2022 07:02 AM    GFRAA >60 11/14/2015 04:10 AM    LABGLOM >60 12/13/2022 07:02 AM    GLUCOSE 57 12/13/2022 07:02 AM    PROT 7.2 12/10/2022 01:34 PM    LABALBU 3.8 12/10/2022 01:34 PM    CALCIUM 9.1 12/13/2022 07:02 AM    BILITOT 0.3 12/10/2022 01:34 PM    ALKPHOS 146 12/10/2022 01:34 PM    AST 22 12/10/2022 01:34 PM    ALT 15 12/10/2022 01:34 PM     Warfarin PT/INR:    Lab Results   Component Value Date    INR 1.0 11/14/2015    PROTIME 10.9 11/14/2015       Assessment:    Principal Problem:    Dysphagia  Active Problems: Moderate protein-calorie malnutrition ([address]  Resolved Problems:    * No resolved hospital problems.  *      Plan:  Ct brain for eval of headache / start lactulose for constipation / cont ppi per surgery advance diet per surgery        Neeta Adams DO  5:58 PM  12/14/2022

## 2022-12-14 NOTE — PROGRESS NOTES
Physical Therapy  Physical Therapy Treatment    Name: Caleb Finn  : 1950  MRN: [id number]      Date of Service: 2022    Evaluating PT:  Greg Flynn, PT, DPT [id number]    Room #:  3089/2511-B  Diagnosis:  Odynophagia [R13.10]  Dysphagia [R13.10]  Lumbar compression fracture, closed, initial encounter ([facility] [S32.000A]  Dysphagia, unspecified type [R13.10]  Compression fracture of thoracic vertebra, unspecified thoracic vertebral level, initial encounter ([facility] [S22.000A]  PMHx/PSHx:  Osteoporosis, HTN, vertebral compression fracture  Procedure/Surgery:  None  Precautions:  Falls, spinal precautions, TLSO  Equipment Needs:  FWW; TBD  Equipment Owned:  SPC, standard walker    SUBJECTIVE:    Pt lives with  in a 1 story home with 4 stair(s) to enter and 1 rail(s). Bed is on 1st floor and bath is on 1st floor. Pt ambulated using SPC with assistance as needed PTA. OBJECTIVE:   Initial Evaluation  Date: 2022 Treatment  2022 Short Term/ Long Term   Goals   AM-PAC 6 Clicks 15/52 61/16    Was pt agreeable to Eval/treatment? Yes Yes    Does pt have pain? 7/10 abdomen, back, headache Abdominal pain     Bed Mobility  Rolling: SBA  Supine to sit: SBA  Sit to supine: NT  Scooting: NT Rolling: min  Supine to sit: Min  Sit to supine: NT  Scooting: min Rolling: Independent  Supine to sit: Independent  Sit to supine: Independent  Scooting: Independent   Transfers Sit to stand: SBA  Stand to sit: Priya  Stand pivot: NT Sit to stand: Priya  Stand to sit: Priya  Stand pivot: min ww  Sit to stand: Mod I  Stand to sit: Mod I  Stand pivot:  Mod I with AAD   Ambulation    15 feet with SPC + HHA Priya  50 feet with [facility] Priya 40 feet x 2 with [facility] Priya 150 feet with AAD Mod I   Stair negotiation: ascended and descended  NT NT 4 step(s) with 1 rail(s) Mod I   ROM BUE:  See OT note  BLE:  WFL     Strength BUE:  See OT note  BLE:  Grossly 5/5     Balance Sitting EOB:  SBA  Dynamic Standing:  Priya with SPC + HHA; Priya with [employer] Sitting EOB:  SBA  Dynamic Standing:  Priya with [employer] Sitting EOB:  Independent  Dynamic Standing: Mod I with AAD   Pt is A & O x  3  Edema:  na      Therapeutic Exercises: laq, ankle pujmps    Patient education  Pt educated on precautions, brace, function    Patient response to education:   Pt verbalized understanding Pt demonstrated skill Pt requires further education in this area   x x x     ASSESSMENT:    Comments:  pt in bed upon arrival.   Pt performed function as per above. Pt unsteady on her feet. Pt requires increase time to complete activities. Treatment:  Patient practiced and was instructed in the following treatment:    Pt education - brace and function  Bed mobility  Transfers  Gait -   unsteady gait         PLAN:    Patient is making fair  progress towards established goals. Will continue with current POC.       Time in  1343  Time out  1415    Total Treatment Time  32  minutes       CPT codes:  [x] Therapeutic activities 00092 32 minutes  [] Therapeutic exercises 18085 0 minutes      Shaniqua Euceda, [address]

## 2022-12-14 NOTE — PROGRESS NOTES
GENERAL SURGERY  DAILY PROGRESS NOTE  12/14/2022    CHIEF COMPLAINT:  Chief Complaint   Patient presents with    Dysphagia     Patient called ambulance for back pain because she thinks she broke another bone in her back, now she states she is having difficulty since yesterday. SUBJECTIVE:  Patient underwent EGD yesterday with evidence of ulcers, hiatal hernia. Patient complains of headache this morning. No acute events overnight. OBJECTIVE:  /75   Pulse 96   Temp 97.2 °F (36.2 °C) (Temporal)   Resp 15   Ht 5' 4\" (1.626 m)   Wt 115 lb (52.2 kg)   SpO2 94%   BMI 19.74 kg/m²     GENERAL: No acute distress  LUNGS:  No increased work of breathing on room air  CARDIOVASCULAR: Hemodynamically stable  ABDOMEN: Soft and nontender. No rebound or rigidity. No obvious distention. SKIN: Warm, without jaundice    ASSESSMENT/PLAN:  67 y.o. female recent weight loss, dysphagia and constipation  EGD 12/13-gastric and duodenal ulcerations, hiatal hernia and possible Aleman's.     Plan  -We will start PPI twice daily  -Consult dietary  -Okay to continue clears from our standpoint, appreciate speech recs    Plan discussed with Dr. Butch Carroll DO  Surgery Resident PGY-1  12/14/2022  7:49 AM

## 2022-12-14 NOTE — CARE COORDINATION
12/14. Met with the pt at the bedside to discuss transition of care. The pt lives with her  and will return home. She would like home health care. She does not have a preference. Spoke to the pt's . He is in agreement with the pt coming home with home care.  Hiren Perez RN

## 2022-12-15 ENCOUNTER — APPOINTMENT (OUTPATIENT)
Dept: MRI IMAGING | Age: 72
DRG: 384 | End: 2022-12-15
Payer: MEDICARE

## 2022-12-15 PROCEDURE — 97530 THERAPEUTIC ACTIVITIES: CPT

## 2022-12-15 PROCEDURE — 97535 SELF CARE MNGMENT TRAINING: CPT

## 2022-12-15 PROCEDURE — 2580000003 HC RX 258: Performed by: ANESTHESIOLOGY

## 2022-12-15 PROCEDURE — 2580000003 HC RX 258: Performed by: SURGERY

## 2022-12-15 PROCEDURE — 6360000004 HC RX CONTRAST MEDICATION: Performed by: RADIOLOGY

## 2022-12-15 PROCEDURE — 6370000000 HC RX 637 (ALT 250 FOR IP): Performed by: STUDENT IN AN ORGANIZED HEALTH CARE EDUCATION/TRAINING PROGRAM

## 2022-12-15 PROCEDURE — 1200000000 HC SEMI PRIVATE

## 2022-12-15 PROCEDURE — 6360000002 HC RX W HCPCS: Performed by: SURGERY

## 2022-12-15 PROCEDURE — A9577 INJ MULTIHANCE: HCPCS | Performed by: RADIOLOGY

## 2022-12-15 PROCEDURE — 6370000000 HC RX 637 (ALT 250 FOR IP): Performed by: SURGERY

## 2022-12-15 PROCEDURE — 6370000000 HC RX 637 (ALT 250 FOR IP): Performed by: INTERNAL MEDICINE

## 2022-12-15 PROCEDURE — 70553 MRI BRAIN STEM W/O & W/DYE: CPT

## 2022-12-15 RX ADMIN — LACTULOSE 20 G: 20 SOLUTION ORAL at 08:51

## 2022-12-15 RX ADMIN — DOCUSATE SODIUM 100 MG: 100 CAPSULE, LIQUID FILLED ORAL at 20:31

## 2022-12-15 RX ADMIN — TRAMADOL HYDROCHLORIDE 50 MG: 50 TABLET, COATED ORAL at 21:53

## 2022-12-15 RX ADMIN — TRAMADOL HYDROCHLORIDE 50 MG: 50 TABLET, COATED ORAL at 15:23

## 2022-12-15 RX ADMIN — ENOXAPARIN SODIUM 40 MG: 100 INJECTION SUBCUTANEOUS at 08:51

## 2022-12-15 RX ADMIN — PANTOPRAZOLE SODIUM 40 MG: 40 TABLET, DELAYED RELEASE ORAL at 06:35

## 2022-12-15 RX ADMIN — SODIUM CHLORIDE, PRESERVATIVE FREE 10 ML: 5 INJECTION INTRAVENOUS at 20:36

## 2022-12-15 RX ADMIN — SUCRALFATE 1 G: 1 TABLET ORAL at 20:31

## 2022-12-15 RX ADMIN — ACETAMINOPHEN 650 MG: 325 TABLET ORAL at 05:20

## 2022-12-15 RX ADMIN — AMLODIPINE BESYLATE 5 MG: 5 TABLET ORAL at 08:49

## 2022-12-15 RX ADMIN — SODIUM CHLORIDE, PRESERVATIVE FREE 10 ML: 5 INJECTION INTRAVENOUS at 20:32

## 2022-12-15 RX ADMIN — LACTULOSE 20 G: 20 SOLUTION ORAL at 15:23

## 2022-12-15 RX ADMIN — SUCRALFATE 1 G: 1 TABLET ORAL at 15:23

## 2022-12-15 RX ADMIN — SUCRALFATE 1 G: 1 TABLET ORAL at 12:27

## 2022-12-15 RX ADMIN — GADOBENATE DIMEGLUMINE 10 ML: 529 INJECTION, SOLUTION INTRAVENOUS at 14:41

## 2022-12-15 RX ADMIN — LACTULOSE 20 G: 20 SOLUTION ORAL at 20:31

## 2022-12-15 RX ADMIN — TRAMADOL HYDROCHLORIDE 50 MG: 50 TABLET, COATED ORAL at 08:49

## 2022-12-15 RX ADMIN — SODIUM CHLORIDE, PRESERVATIVE FREE 10 ML: 5 INJECTION INTRAVENOUS at 08:51

## 2022-12-15 RX ADMIN — PANTOPRAZOLE SODIUM 40 MG: 40 TABLET, DELAYED RELEASE ORAL at 15:23

## 2022-12-15 RX ADMIN — DOCUSATE SODIUM 100 MG: 100 CAPSULE, LIQUID FILLED ORAL at 08:50

## 2022-12-15 RX ADMIN — SENNOSIDES 8.6 MG: 8.6 TABLET, FILM COATED ORAL at 20:31

## 2022-12-15 RX ADMIN — SUCRALFATE 1 G: 1 TABLET ORAL at 06:35

## 2022-12-15 ASSESSMENT — PAIN - FUNCTIONAL ASSESSMENT
PAIN_FUNCTIONAL_ASSESSMENT: PREVENTS OR INTERFERES SOME ACTIVE ACTIVITIES AND ADLS

## 2022-12-15 ASSESSMENT — PAIN DESCRIPTION - ORIENTATION
ORIENTATION: LOWER
ORIENTATION: MID
ORIENTATION: LOWER

## 2022-12-15 ASSESSMENT — PAIN DESCRIPTION - LOCATION
LOCATION: BACK
LOCATION: BACK
LOCATION: BACK;HEAD;ABDOMEN
LOCATION: BACK

## 2022-12-15 ASSESSMENT — PAIN SCALES - GENERAL
PAINLEVEL_OUTOF10: 8
PAINLEVEL_OUTOF10: 10
PAINLEVEL_OUTOF10: 8
PAINLEVEL_OUTOF10: 10
PAINLEVEL_OUTOF10: 10

## 2022-12-15 ASSESSMENT — PAIN DESCRIPTION - DESCRIPTORS
DESCRIPTORS: ACHING;DISCOMFORT;SORE
DESCRIPTORS: ACHING;DISCOMFORT;SORE
DESCRIPTORS: DISCOMFORT;ACHING;SORE
DESCRIPTORS: DISCOMFORT;DULL;SORE

## 2022-12-15 ASSESSMENT — PAIN DESCRIPTION - ONSET: ONSET: ON-GOING

## 2022-12-15 ASSESSMENT — PAIN DESCRIPTION - PAIN TYPE
TYPE: ACUTE PAIN;CHRONIC PAIN
TYPE: ACUTE PAIN

## 2022-12-15 ASSESSMENT — PAIN DESCRIPTION - FREQUENCY: FREQUENCY: CONTINUOUS

## 2022-12-15 NOTE — PROGRESS NOTES
Physical Therapy  Physical Therapy Treatment    Name: Mahi Loera  : 1950  MRN: [id number]      Date of Service: 12/15/2022    Evaluating PT:  Sherman Escoto PT, DPT [id number]    Room #:  6158/1767-R  Diagnosis:  Odynophagia [R13.10]  Dysphagia [R13.10]  Lumbar compression fracture, closed, initial encounter ([facility] [S32.000A]  Dysphagia, unspecified type [R13.10]  Compression fracture of thoracic vertebra, unspecified thoracic vertebral level, initial encounter ([facility] [S22.000A]  PMHx/PSHx:  Osteoporosis, HTN, vertebral compression fracture  Procedure/Surgery:  EGD BIOPSY (2022)  Precautions:  Falls, spinal precautions, TLSO  Equipment Needs:  FWW; TBD  Equipment Owned:  SPC, standard walker    SUBJECTIVE:    Pt lives with  in a 1 story home with 4 stair(s) to enter and 1 rail(s). Bed is on 1st floor and bath is on 1st floor. Pt ambulated using SPC with assistance as needed PTA. OBJECTIVE:   Initial Evaluation  Date: 2022 Treatment  12/15/2022 Short Term/ Long Term   Goals   AM-PAC 6 Clicks 95/85 78/33    Was pt agreeable to Eval/treatment? Yes Yes    Does pt have pain? 7/10 abdomen, back, headache Mild head, back, abdomen    Bed Mobility  Rolling: SBA  Supine to sit: SBA  Sit to supine: NT  Scooting: NT Rolling: SBA  Supine to sit: Priya  Sit to supine: SBA  Scooting: SBA (seated) Rolling: Independent  Supine to sit: Independent  Sit to supine: Independent  Scooting: Independent   Transfers Sit to stand: SBA  Stand to sit: Priya  Stand pivot: NT Sit to stand: SBA  Stand to sit: SBA  Stand pivot: NT Sit to stand: Mod I  Stand to sit: Mod I  Stand pivot:  Mod I with AAD   Ambulation    15 feet with SPC + HHA Priya  50 feet with [employer] Priya 70', 95' with [employer]  feet with AAD Mod I   Stair negotiation: ascended and descended  NT 4 steps with 1 rail + SPC Priya 4 step(s) with 1 rail(s) Mod I   ROM BUE:  See OT note  BLE:  WFL     Strength BUE:  See OT note  BLE:  Grossly 5/5     Balance Sitting EOB:  SBA  Dynamic Standing:  Priya with SPC + HHA; Priya with [employer] Sitting EOB:  SBA  Dynamic Standing:  SBA with [employer] Sitting EOB:  Independent  Dynamic Standing: Mod I with AAD     Pt is A & O x 4  Sensation:  No reports of numbness/tingling to extremities  Edema:  Unremarkable    Vitals:   , SpO2 96% at rest.  -136, SpO2 94-97% with activity. Patient education  Pt educated on safety during functional mobility. Patient response to education:   Pt verbalized understanding Pt demonstrated skill Pt requires further education in this area   Yes Yes Reinforcement     ASSESSMENT:    Comments:  Patient in semi-Garza's position with nursing present upon arrival; agreeable to PT session with OT collaboration. Patient reported that per Dr. Manolo Boyer (neurosurgery), OK for patient to don TLSO sitting EOB; confirmed via telephone communication between Dr. Manolo Boyer and OT. Bed mobility performed via log roll technique. TLSO donned sitting EOB. Ambulated with decreased speed. Following seated rest break, completed second ambulation bout to stairs; required increased time during stair negotiation. Patient transported back to room via wheelchair. Activity limited by fatigue. Reported shortness of breath with activity. Vitals monitored and noted. Patient left in Garza's position with TLSO donned, BLE elevated, call light in reach.     Treatment:  Patient practiced and was instructed in the following treatment:    Bed mobility - physical assistance provided as needed during activity  Functional transfers - physical assistance provided as needed during activity  Ambulation - physical assistance provided as needed during activity  Stair negotiation - verbal cues to facilitate proper positioning and sequencing; physical assistance provided as needed during activity  Skilled positioning - patient positioned optimally to protect skin/joint integrity and promote comfort  Skilled monitoring of vitals    PLAN:    Patient is making good progress towards established goals. Will continue with current POC.       Time in  0900  Time out  0945    Total Treatment Time  45 minutes     CPT codes:  [] Gait training 73246 0 minutes  [] Manual therapy 85860 0 minutes  [x] Therapeutic activities 08639 45 minutes  [] Therapeutic exercises 00179 0 minutes  [] Neuromuscular reeducation 94655 0 minutes    Jorge Mabry PT, DPT  [id number]

## 2022-12-15 NOTE — PLAN OF CARE
Problem: Discharge Planning  Goal: Discharge to home or other facility with appropriate resources  Outcome: Progressing     Problem: Safety - Adult  Goal: Free from fall injury  Outcome: Progressing     Problem: ABCDS Injury Assessment  Goal: Absence of physical injury  Outcome: Progressing     Problem: Pain  Goal: Verbalizes/displays adequate comfort level or baseline comfort level  Outcome: Progressing  Flowsheets (Taken 12/14/2022 5105)  Verbalizes/displays adequate comfort level or baseline comfort level: Encourage patient to monitor pain and request assistance     Problem: Nutrition Deficit:  Goal: Optimize nutritional status  Outcome: Progressing

## 2022-12-15 NOTE — PROGRESS NOTES
Hospital Medicine    Subjective:  pt c/o back pain no bm x several days pt now agreeable to colonoscopy      Current Facility-Administered Medications:     pantoprazole (PROTONIX) tablet 40 mg, 40 mg, Oral, BID AC, Alexus Schrader MD, 40 mg at 12/15/22 1523    lactulose (CHRONULAC) 10 GM/15ML solution 20 g, 20 g, Oral, TID, Nandini Glassfrancisco Hannamer, DO, 20 g at 12/15/22 1523    sucralfate (CARAFATE) tablet 1 g, 1 g, Oral, 4x Daily AC & HS, Nikhil Leigh MD, 1 g at 12/15/22 1523    sodium chloride flush 0.9 % injection 5-40 mL, 5-40 mL, IntraVENous, 2 times per day, Joo Cain MD, 10 mL at 12/15/22 0851    sodium chloride flush 0.9 % injection 5-40 mL, 5-40 mL, IntraVENous, PRN, Joo Cain MD    0.9 % sodium chloride infusion, , IntraVENous, PRN, Joo Cain MD    traMADol Wallace Mouse) tablet 50 mg, 50 mg, Oral, Q6H PRN, Nikhil Leigh MD, 50 mg at 12/15/22 1523    amLODIPine (NORVASC) tablet 5 mg, 5 mg, Oral, Daily, Nikhil Leigh MD, 5 mg at 12/15/22 0849    docusate sodium (COLACE) capsule 100 mg, 100 mg, Oral, BID, Nikhil Leigh MD, 100 mg at 12/15/22 0850    sodium chloride flush 0.9 % injection 5-40 mL, 5-40 mL, IntraVENous, 2 times per day, Nikhil Leigh MD, 10 mL at 12/13/22 0854    sodium chloride flush 0.9 % injection 5-40 mL, 5-40 mL, IntraVENous, PRN, Nikhil Leigh MD    0.9 % sodium chloride infusion, , IntraVENous, PRN, Nikhil Leigh MD    enoxaparin (LOVENOX) injection 40 mg, 40 mg, SubCUTAneous, Daily, Nikhil eLigh MD, 40 mg at 12/15/22 0851    ondansetron (ZOFRAN-ODT) disintegrating tablet 4 mg, 4 mg, Oral, Q8H PRN, 4 mg at 12/14/22 1002 **OR** ondansetron (ZOFRAN) injection 4 mg, 4 mg, IntraVENous, Q6H PRN, Nikhil Leigh MD    acetaminophen (TYLENOL) tablet 650 mg, 650 mg, Oral, Q6H PRN, 650 mg at 12/15/22 0520 **OR** acetaminophen (TYLENOL) suppository 650 mg, 650 mg, Rectal, Q6H PRN, Nikhil Leigh MD    polyethylene glycol (GLYCOLAX) packet 17 g, 17 g, Oral, Daily, Shayy Hartman MD, 17 g at 12/12/22 7205    senna (SENOKOT) tablet 8.6 mg, 1 tablet, Oral, Nightly, Shayy Hartman MD, 8.6 mg at 12/14/22 2146    Objective:    /62   Pulse (!) 102   Temp 97.9 °F (36.6 °C) (Temporal)   Resp 18   Ht 5' 4\" (1.626 m)   Wt 118 lb (53.5 kg)   SpO2 (!) 88%   BMI 20.25 kg/m²     Heart:  reg  Lungs:  ctab  Abd: + bs soft nondistended  Extrem:  w/o edema    CBC with Differential:    Lab Results   Component Value Date/Time    WBC 4.4 12/13/2022 07:02 AM    RBC 4.21 12/13/2022 07:02 AM    HGB 12.7 12/13/2022 07:02 AM    HCT 40.8 12/13/2022 07:02 AM     12/13/2022 07:02 AM    MCV 96.9 12/13/2022 07:02 AM    MCH 30.2 12/13/2022 07:02 AM    MCHC 31.1 12/13/2022 07:02 AM    RDW 15.9 12/13/2022 07:02 AM    LYMPHOPCT 16.3 12/13/2022 07:02 AM    MONOPCT 10.1 12/13/2022 07:02 AM    BASOPCT 1.4 12/13/2022 07:02 AM    MONOSABS 0.44 12/13/2022 07:02 AM    LYMPHSABS 0.71 12/13/2022 07:02 AM    EOSABS 0.26 12/13/2022 07:02 AM    BASOSABS 0.06 12/13/2022 07:02 AM     CMP:    Lab Results   Component Value Date/Time     12/13/2022 07:02 AM    K 3.9 12/13/2022 07:02 AM     12/13/2022 07:02 AM    CO2 16 12/13/2022 07:02 AM    BUN 2 12/13/2022 07:02 AM    CREATININE 0.4 12/13/2022 07:02 AM    GFRAA >60 11/14/2015 04:10 AM    LABGLOM >60 12/13/2022 07:02 AM    GLUCOSE 57 12/13/2022 07:02 AM    PROT 7.2 12/10/2022 01:34 PM    LABALBU 3.8 12/10/2022 01:34 PM    CALCIUM 9.1 12/13/2022 07:02 AM    BILITOT 0.3 12/10/2022 01:34 PM    ALKPHOS 146 12/10/2022 01:34 PM    AST 22 12/10/2022 01:34 PM    ALT 15 12/10/2022 01:34 PM     Warfarin PT/INR:    Lab Results   Component Value Date    INR 1.0 11/14/2015    PROTIME 10.9 11/14/2015       Assessment:    Principal Problem:    Dysphagia  Active Problems: Moderate protein-calorie malnutrition ([address]  Resolved Problems:    * No resolved hospital problems.  *      Plan:  Mri brain / notify surgery pt is now agreeable to colonoscopy / dc planning        Angelica Mcconnell,   6:22 PM  12/15/2022

## 2022-12-15 NOTE — PROGRESS NOTES
SPEECH LANGUAGE PATHOLOGY  DAILY PROGRESS NOTE        PATIENT NAME:  Anca Gillette      :  1950          TODAY'S DATE:  12/15/2022 [facility]    Attempted ongoing Speech-Language Pathology intervention for dysphagia management. Pt unavailable at this time due to:  [] HOLD per RN/ medical staff d/t medical status   [] Off unit for testing/ procedure    [x] With transport staff to be taken for testing   [] Declined intervention  [] Sleeping/ Lethargic   [] Other:     SLP to continue previously established POC and re-attempt as able.

## 2022-12-15 NOTE — PROGRESS NOTES
Occupational Therapy  OT BEDSIDE TREATMENT NOTE   [address] [address] [address]      YKH:  Patient Name: Laura Murphy  MRN: [id number]  : 1950  Room: [facility]     Referring Provider: Placido Khan DO  Specific Provider Orders/Date: OT evaluation and treat 22     Evaluating OT: Chava Mathew. Josefina OTR/L #[id number]     Diagnosis: Odynophagia [R13.10]  Dysphagia [R13.10]  Lumbar compression fracture, closed, initial encounter ([facility] [S32.000A]  Dysphagia, unspecified type [R13.10]  Compression fracture of thoracic vertebra, unspecified thoracic vertebral level, initial encounter [facility]) [S22.000A]       Surgery:   Past Surgical History   History reviewed. No pertinent surgical history. Pertinent Medical History:  has a past medical history of Bruising tendency, Hyperlipidemia, Hypertension, and Osteoporosis.       Precautions:  Fall Risk, TLSO brace, spinal, [place]     Assessment of current deficits   [x] Functional mobility             [x]ADLs           [] Strength                  []Cognition   [x] Functional transfers           [x] IADLs         [x] Safety Awareness   [x]Endurance   [] Fine Coordination              [x] Balance      [] Vision/perception   []Sensation     []Gross Motor Coordination  [] ROM           [] Delirium                   [] Motor Control      OT PLAN OF CARE   OT POC based on physician orders, patient diagnosis and results of clinical assessment     Frequency/Duration   2-4 days/wk for 1 week PRN   Specific OT Treatment Interventions to include:   * Instruction/training on adapted ADL techniques and AE recommendations to increase functional independence within precautions       * Training on energy conservation strategies, correct breathing pattern and techniques to improve independence/tolerance for self-care routine  * Functional transfer/mobility training/DME recommendations for increased independence, safety, and fall prevention  * Patient/Family education to increase follow through with safety techniques and functional independence  * Recommendation of environmental modifications for increased safety with functional transfers/mobility and ADLs  * Therapeutic activities to facilitate/challenge dynamic balance, stand tolerance for increased safety and independence with ADLs  * Therapeutic activities to facilitate gross/fine motor skills for increased independence with ADLs  * Positioning to improve skin integrity, interaction with environment and functional independence     Recommended Adaptive Equipment: ww, AE prn, shower seat     Home Living: Pt lives with   in a one story home with 4 steps and one hand rails. Bed and bath on main  floor. Family/ Outside assistance available:    Bathroom setup: tub shower   Equipment owned: std walker, spc     Prior Level of Function: assisted as needed patient sponge bathed since fall  with ADLs , assisted with IADLs; ambulated spc and std walker  Driving: no  Occupation: retired  Medication management: self  Leisure: Mosque and going out to eat     Pain Level: 10/10 back , head and abdominal pain    Cognition: A&O: 4/4; Follows one step directions  with increased volume              Memory:  fair               Sequencing:  fair              Problem solving:  fair              Judgement/safety:  fair                Functional Assessment:  AM-PAC Daily Activity Raw Score: 16/24    Initial Eval Status  Date: 12/12/22 Treatment Status  Date: 12/15/22 STGs = LTGs  Time frame: 1-3days   Feeding Liquids able to open packages  set up Mod I    Grooming SBA  SBA  To comb hair seated Mod I    UB Dressing Mod A for TLSO brace donning in supine Max A  To don TLSO supine rolling side to side.   Min A  To don/doff gown seated Mod I    LB Dressing Mod A to don pants and undergarment in supine  Mod A  SBA to don underwear supine bringing legs upward and rolling to bring over hips. Mod A to don pants seated EOB and standing to pull over hips Mod I with AE prn   Bathing Simulated mod A  Mod A  Seated EOB and supine rolling for posterior  Mod I seated with LHS   Toileting Mod A incontinent in undergarment/ purr wick  Min A- clothing management  Min A- hygiene Mod I with ww    Bed Mobility  Supine to sit: SBA   Sit to supine:  SBA  Rolling min A  Min A- supine<>sit  Educated pt on technique to increase independence. Supine to sit: mod I log rolling  Sit to supine: mod I   Functional Transfers Sit to stand min A with spc and needing increased support, min A with ww seated EOB /81  SBA- sit<->stand  Cuing for hand placement    Mod I with ww   Functional Mobility Min A in room short distance with increased fatigue and thought of fainting   SBA  Home distance using w/w Mod I with ww   Balance Sitting:     Static:  SBA    Dynamic:CGA  Standing: min A   Sitting:     Static:  ind    Dynamic: SBA  Standing: SBA                                                                      Activity Tolerance Fair limited by pain and fatigue O2 98%  Fair- Good for ADL completion   Visual/  Perceptual Glasses: yes reading           Safety fair  Fair                                 Good with spinal precautions and TLSO brace wear and follow through       Comments: Upon arrival pt supine in bed. Pt educated on adaptive techniques to increase independence and safety during ADL's, bed mobility, and functional transfers while maintaining precautions. At end of session pt left seated upright in bed, call light within reach. Pt has made fair progress towards set goals.      Continue with current plan of care    Treatment Time In: 9:00            Treatment Time Out: 9:45            Treatment Charges: Mins Units   Ther Ex  72720     Manual Therapy 05123     Thera Activities 79900 20 1   ADL/Home Mgt 54278 25 2   Neuro Re-ed [facility] manage/training  80519 Non-Billable Time     Total Timed Treatment 95 [address] [place] [address]

## 2022-12-15 NOTE — PROGRESS NOTES
12/15- Sent for patient at 6:30am- RN called- Patient has no IV accesss- transport canceled-RN to let us know when patient is ready.

## 2022-12-15 NOTE — PLAN OF CARE
Problem: Discharge Planning  Goal: Discharge to home or other facility with appropriate resources  12/15/2022 0953 by Armond Leggett RN  Outcome: Progressing  12/14/2022 2126 by Iva Abbott RN  Outcome: Progressing  Flowsheets (Taken 12/14/2022 0900)  Discharge to home or other facility with appropriate resources: Identify discharge learning needs (meds, wound care, etc)

## 2022-12-15 NOTE — PROGRESS NOTES
Neurosurg progress note  VITALS:  /62   Pulse (!) 102   Temp 97.9 °F (36.6 °C) (Temporal)   Resp 18   Ht 5' 4\" (1.626 m)   Wt 118 lb (53.5 kg)   SpO2 (!) 88%   BMI 20.25 kg/m²   24HR INTAKE/OUTPUT:    Intake/Output Summary (Last 24 hours) at 12/15/2022 1528  Last data filed at 12/14/2022 2152  Gross per 24 hour   Intake 360 ml   Output 325 ml   Net 35 ml     CT THORACIC SPINE WO CONTRAST    Result Date: 12/10/2022  EXAMINATION: CT OF THE LUMBAR SPINE WITHOUT CONTRAST; CT OF THE THORACIC SPINE WITHOUT CONTRAST  12/10/2022 TECHNIQUE: CT of the lumbar spine was performed without the administration of intravenous contrast. Multiplanar reformatted images are provided for review. Adjustment of mA and/or kV according to patient size was utilized. Automated exposure control, iterative reconstruction, and/or weight based adjustment of the mA/kV was utilized to reduce the radiation dose to as low as reasonably achievable.; CT of the thoracic spine was performed without the administration of intravenous contrast. Multiplanar reformatted images are provided for review. Automated exposure control, iterative reconstruction, and/or weight based adjustment of the mA/kV was utilized to reduce the radiation dose to as low as reasonably achievable. COMPARISON: None HISTORY: ORDERING SYSTEM PROVIDED HISTORY: compression fracture TECHNOLOGIST PROVIDED HISTORY: Reason for exam:->compression fracture Decision Support Exception - unselect if not a suspected or confirmed emergency medical condition->Emergency Medical Condition ([place]) What reading provider will be dictating this exam?->CRC FINDINGS: CT thoracic spine. Multiple compression fractures are identified in the thoracic spine which involves but T6, T8, T9, T11 and T12 with nearly 30-50% loss of height. Compression fractures of L1-L2 and L3 are also noted. There is osteopenia and degenerative changes. The lung bases demonstrate atelectasis.   There is coronary artery calcification. A     Extensive compression fractures of the thoracic spine as noted with kyphosis. Atelectasis in the lung bases. CT lumbar spine. Comparison 11/09/2022 Findings There is significant compression deformity of L1, L2 and L3 with loss of height ranging from but 30-50% with progressive compression of L2. There is mild retropulsion of the bony fragments at L1  ,L2. There is osteopenia and degenerative changes in the lumbar spine. Impression Diffuse compression fractures of L1 ,L2 and L3 as noted with progressive compression of L2. There is retropulsion of the bony fragments at L1 and L2. RECOMMENDATIONS: Unavailable     CT LUMBAR SPINE WO CONTRAST    Result Date: 12/10/2022  EXAMINATION: CT OF THE LUMBAR SPINE WITHOUT CONTRAST; CT OF THE THORACIC SPINE WITHOUT CONTRAST  12/10/2022 TECHNIQUE: CT of the lumbar spine was performed without the administration of intravenous contrast. Multiplanar reformatted images are provided for review. Adjustment of mA and/or kV according to patient size was utilized. Automated exposure control, iterative reconstruction, and/or weight based adjustment of the mA/kV was utilized to reduce the radiation dose to as low as reasonably achievable.; CT of the thoracic spine was performed without the administration of intravenous contrast. Multiplanar reformatted images are provided for review. Automated exposure control, iterative reconstruction, and/or weight based adjustment of the mA/kV was utilized to reduce the radiation dose to as low as reasonably achievable. COMPARISON: None HISTORY: ORDERING SYSTEM PROVIDED HISTORY: compression fracture TECHNOLOGIST PROVIDED HISTORY: Reason for exam:->compression fracture Decision Support Exception - unselect if not a suspected or confirmed emergency medical condition->Emergency Medical Condition ([place]) What reading provider will be dictating this exam?->CRC FINDINGS: CT thoracic spine.  Multiple compression fractures are identified in the thoracic spine which involves but T6, T8, T9, T11 and T12 with nearly 30-50% loss of height. Compression fractures of L1-L2 and L3 are also noted. There is osteopenia and degenerative changes. The lung bases demonstrate atelectasis. There is coronary artery calcification. A     Extensive compression fractures of the thoracic spine as noted with kyphosis. Atelectasis in the lung bases. CT lumbar spine. Comparison 11/09/2022 Findings There is significant compression deformity of L1, L2 and L3 with loss of height ranging from but 30-50% with progressive compression of L2. There is mild retropulsion of the bony fragments at L1  ,L2. There is osteopenia and degenerative changes in the lumbar spine. Impression Diffuse compression fractures of L1 ,L2 and L3 as noted with progressive compression of L2.   There is retropulsion of the bony fragments at L1 and L2. RECOMMENDATIONS: Unavailable     CBC:   Lab Results   Component Value Date/Time    WBC 4.4 12/13/2022 07:02 AM    RBC 4.21 12/13/2022 07:02 AM    HGB 12.7 12/13/2022 07:02 AM    HCT 40.8 12/13/2022 07:02 AM    MCV 96.9 12/13/2022 07:02 AM    MCH 30.2 12/13/2022 07:02 AM    MCHC 31.1 12/13/2022 07:02 AM    RDW 15.9 12/13/2022 07:02 AM     12/13/2022 07:02 AM    MPV 9.5 12/13/2022 07:02 AM     BMP:    Lab Results   Component Value Date/Time     12/13/2022 07:02 AM    K 3.9 12/13/2022 07:02 AM     12/13/2022 07:02 AM    CO2 16 12/13/2022 07:02 AM    BUN 2 12/13/2022 07:02 AM    LABALBU 3.8 12/10/2022 01:34 PM    CREATININE 0.4 12/13/2022 07:02 AM    CALCIUM 9.1 12/13/2022 07:02 AM    GFRAA >60 11/14/2015 04:10 AM    LABGLOM >60 12/13/2022 07:02 AM    GLUCOSE 57 12/13/2022 07:02 AM      pantoprazole  40 mg Oral BID AC    lactulose  20 g Oral TID    sucralfate  1 g Oral 4x Daily AC & HS    sodium chloride flush  5-40 mL IntraVENous 2 times per day    amLODIPine  5 mg Oral Daily    docusate sodium  100 mg Oral BID    sodium chloride flush  5-40 mL IntraVENous 2 times per day    enoxaparin  40 mg SubCUTAneous Daily    polyethylene glycol  17 g Oral Daily    senna  1 tablet Oral Nightly     Awake and alert still no bowel movement needs colonoscopy she is motor full throughout TLSO brace at bedside  Assessment:  Patient Active Problem List   Diagnosis    Lumbar [facility], closed, [facility]    Constipation    Unable to care for self    Dysphagia    Moderate protein-calorie malnutrition (HCC)     Plan:Continue current care  Ashish Mathew MD M.D.

## 2022-12-16 VITALS
RESPIRATION RATE: 16 BRPM | SYSTOLIC BLOOD PRESSURE: 120 MMHG | DIASTOLIC BLOOD PRESSURE: 65 MMHG | WEIGHT: 118 LBS | BODY MASS INDEX: 20.14 KG/M2 | HEART RATE: 97 BPM | TEMPERATURE: 97.1 F | HEIGHT: 64 IN | OXYGEN SATURATION: 91 %

## 2022-12-16 PROCEDURE — 97530 THERAPEUTIC ACTIVITIES: CPT

## 2022-12-16 PROCEDURE — 92526 ORAL FUNCTION THERAPY: CPT | Performed by: SPEECH-LANGUAGE PATHOLOGIST

## 2022-12-16 PROCEDURE — 6370000000 HC RX 637 (ALT 250 FOR IP): Performed by: SURGERY

## 2022-12-16 PROCEDURE — 6360000002 HC RX W HCPCS: Performed by: SURGERY

## 2022-12-16 PROCEDURE — 6370000000 HC RX 637 (ALT 250 FOR IP): Performed by: STUDENT IN AN ORGANIZED HEALTH CARE EDUCATION/TRAINING PROGRAM

## 2022-12-16 PROCEDURE — 2580000003 HC RX 258: Performed by: ANESTHESIOLOGY

## 2022-12-16 PROCEDURE — 6370000000 HC RX 637 (ALT 250 FOR IP): Performed by: INTERNAL MEDICINE

## 2022-12-16 RX ORDER — PANTOPRAZOLE SODIUM 40 MG/1
40 TABLET, DELAYED RELEASE ORAL
Qty: 60 TABLET | Refills: 0 | Status: SHIPPED | OUTPATIENT
Start: 2022-12-16

## 2022-12-16 RX ORDER — ENEMA 19; 7 G/133ML; G/133ML
1 ENEMA RECTAL ONCE
Status: COMPLETED | OUTPATIENT
Start: 2022-12-16 | End: 2022-12-16

## 2022-12-16 RX ORDER — SENNA PLUS 8.6 MG/1
1 TABLET ORAL NIGHTLY
Qty: 30 TABLET | Refills: 0 | COMMUNITY
Start: 2022-12-16 | End: 2023-01-15

## 2022-12-16 RX ORDER — POLYETHYLENE GLYCOL 3350 17 G/17G
17 POWDER, FOR SOLUTION ORAL DAILY
Qty: 527 G | Refills: 1 | COMMUNITY
Start: 2022-12-17 | End: 2023-01-16

## 2022-12-16 RX ORDER — SUCRALFATE 1 G/1
1 TABLET ORAL
Qty: 120 TABLET | Refills: 0 | Status: SHIPPED | OUTPATIENT
Start: 2022-12-16

## 2022-12-16 RX ORDER — TRAMADOL HYDROCHLORIDE 50 MG/1
50 TABLET ORAL EVERY 6 HOURS PRN
Qty: 20 TABLET | Refills: 0 | Status: SHIPPED | OUTPATIENT
Start: 2022-12-16 | End: 2022-12-21

## 2022-12-16 RX ADMIN — TRAMADOL HYDROCHLORIDE 50 MG: 50 TABLET, COATED ORAL at 11:31

## 2022-12-16 RX ADMIN — SODIUM PHOSPHATE, DIBASIC AND SODIUM PHOSPHATE, MONOBASIC 1 ENEMA: 7; 19 ENEMA RECTAL at 16:17

## 2022-12-16 RX ADMIN — ACETAMINOPHEN 650 MG: 325 TABLET ORAL at 03:40

## 2022-12-16 RX ADMIN — TRAMADOL HYDROCHLORIDE 50 MG: 50 TABLET, COATED ORAL at 03:40

## 2022-12-16 RX ADMIN — SUCRALFATE 1 G: 1 TABLET ORAL at 06:19

## 2022-12-16 RX ADMIN — PANTOPRAZOLE SODIUM 40 MG: 40 TABLET, DELAYED RELEASE ORAL at 06:19

## 2022-12-16 RX ADMIN — SODIUM CHLORIDE, PRESERVATIVE FREE 10 ML: 5 INJECTION INTRAVENOUS at 09:32

## 2022-12-16 RX ADMIN — POLYETHYLENE GLYCOL 3350 17 G: 17 POWDER, FOR SOLUTION ORAL at 09:32

## 2022-12-16 RX ADMIN — ENOXAPARIN SODIUM 40 MG: 100 INJECTION SUBCUTANEOUS at 09:32

## 2022-12-16 RX ADMIN — DOCUSATE SODIUM 100 MG: 100 CAPSULE, LIQUID FILLED ORAL at 09:32

## 2022-12-16 ASSESSMENT — PAIN DESCRIPTION - DESCRIPTORS
DESCRIPTORS: ACHING;DISCOMFORT;SORE
DESCRIPTORS: DISCOMFORT;ACHING;THROBBING
DESCRIPTORS: ACHING;DISCOMFORT

## 2022-12-16 ASSESSMENT — PAIN SCALES - GENERAL
PAINLEVEL_OUTOF10: 8
PAINLEVEL_OUTOF10: 4
PAINLEVEL_OUTOF10: 9
PAINLEVEL_OUTOF10: 10

## 2022-12-16 ASSESSMENT — PAIN DESCRIPTION - LOCATION
LOCATION: BACK

## 2022-12-16 ASSESSMENT — PAIN DESCRIPTION - ORIENTATION: ORIENTATION: MID

## 2022-12-16 NOTE — PROGRESS NOTES
Neurosurg progress note  VITALS:  /65   Pulse 97   Temp 97.1 °F (36.2 °C) (Temporal)   Resp 16   Ht 5' 4\" (1.626 m)   Wt 118 lb (53.5 kg)   SpO2 91%   BMI 20.25 kg/m²   24HR INTAKE/OUTPUT:    Intake/Output Summary (Last 24 hours) at 12/16/2022 1525  Last data filed at 12/16/2022 0900  Gross per 24 hour   Intake 430 ml   Output --   Net 430 ml     CT THORACIC SPINE WO CONTRAST    Result Date: 12/10/2022  EXAMINATION: CT OF THE LUMBAR SPINE WITHOUT CONTRAST; CT OF THE THORACIC SPINE WITHOUT CONTRAST  12/10/2022 TECHNIQUE: CT of the lumbar spine was performed without the administration of intravenous contrast. Multiplanar reformatted images are provided for review. Adjustment of mA and/or kV according to patient size was utilized. Automated exposure control, iterative reconstruction, and/or weight based adjustment of the mA/kV was utilized to reduce the radiation dose to as low as reasonably achievable.; CT of the thoracic spine was performed without the administration of intravenous contrast. Multiplanar reformatted images are provided for review. Automated exposure control, iterative reconstruction, and/or weight based adjustment of the mA/kV was utilized to reduce the radiation dose to as low as reasonably achievable. COMPARISON: None HISTORY: ORDERING SYSTEM PROVIDED HISTORY: compression fracture TECHNOLOGIST PROVIDED HISTORY: Reason for exam:->compression fracture Decision Support Exception - unselect if not a suspected or confirmed emergency medical condition->Emergency Medical Condition (MA) What reading provider will be dictating this exam?->CRC FINDINGS: CT thoracic spine. Multiple compression fractures are identified in the thoracic spine which involves but T6, T8, T9, T11 and T12 with nearly 30-50% loss of height. Compression fractures of L1-L2 and L3 are also noted. There is osteopenia and degenerative changes. The lung bases demonstrate atelectasis. There is coronary artery calcification. A     Extensive compression fractures of the thoracic spine as noted with kyphosis. Atelectasis in the lung bases. CT lumbar spine. Comparison 11/09/2022 Findings There is significant compression deformity of L1, L2 and L3 with loss of height ranging from but 30-50% with progressive compression of L2. There is mild retropulsion of the bony fragments at L1  ,L2. There is osteopenia and degenerative changes in the lumbar spine. Impression Diffuse compression fractures of L1 ,L2 and L3 as noted with progressive compression of L2. There is retropulsion of the bony fragments at L1 and L2. RECOMMENDATIONS: Unavailable     CT LUMBAR SPINE WO CONTRAST    Result Date: 12/10/2022  EXAMINATION: CT OF THE LUMBAR SPINE WITHOUT CONTRAST; CT OF THE THORACIC SPINE WITHOUT CONTRAST  12/10/2022 TECHNIQUE: CT of the lumbar spine was performed without the administration of intravenous contrast. Multiplanar reformatted images are provided for review. Adjustment of mA and/or kV according to patient size was utilized. Automated exposure control, iterative reconstruction, and/or weight based adjustment of the mA/kV was utilized to reduce the radiation dose to as low as reasonably achievable.; CT of the thoracic spine was performed without the administration of intravenous contrast. Multiplanar reformatted images are provided for review. Automated exposure control, iterative reconstruction, and/or weight based adjustment of the mA/kV was utilized to reduce the radiation dose to as low as reasonably achievable. COMPARISON: None HISTORY: ORDERING SYSTEM PROVIDED HISTORY: compression fracture TECHNOLOGIST PROVIDED HISTORY: Reason for exam:->compression fracture Decision Support Exception - unselect if not a suspected or confirmed emergency medical condition->Emergency Medical Condition (MA) What reading provider will be dictating this exam?->CRC FINDINGS: CT thoracic spine.  Multiple compression fractures are identified in the thoracic spine which involves but T6, T8, T9, T11 and T12 with nearly 30-50% loss of height. Compression fractures of L1-L2 and L3 are also noted. There is osteopenia and degenerative changes. The lung bases demonstrate atelectasis. There is coronary artery calcification. A     Extensive compression fractures of the thoracic spine as noted with kyphosis. Atelectasis in the lung bases. CT lumbar spine. Comparison 11/09/2022 Findings There is significant compression deformity of L1, L2 and L3 with loss of height ranging from but 30-50% with progressive compression of L2. There is mild retropulsion of the bony fragments at L1  ,L2. There is osteopenia and degenerative changes in the lumbar spine. Impression Diffuse compression fractures of L1 ,L2 and L3 as noted with progressive compression of L2.   There is retropulsion of the bony fragments at L1 and L2. RECOMMENDATIONS: Unavailable     CBC:   Lab Results   Component Value Date/Time    WBC 4.4 12/13/2022 07:02 AM    RBC 4.21 12/13/2022 07:02 AM    HGB 12.7 12/13/2022 07:02 AM    HCT 40.8 12/13/2022 07:02 AM    MCV 96.9 12/13/2022 07:02 AM    MCH 30.2 12/13/2022 07:02 AM    MCHC 31.1 12/13/2022 07:02 AM    RDW 15.9 12/13/2022 07:02 AM     12/13/2022 07:02 AM    MPV 9.5 12/13/2022 07:02 AM     BMP:    Lab Results   Component Value Date/Time     12/13/2022 07:02 AM    K 3.9 12/13/2022 07:02 AM     12/13/2022 07:02 AM    CO2 16 12/13/2022 07:02 AM    BUN 2 12/13/2022 07:02 AM    LABALBU 3.8 12/10/2022 01:34 PM    CREATININE 0.4 12/13/2022 07:02 AM    CALCIUM 9.1 12/13/2022 07:02 AM    GFRAA >60 11/14/2015 04:10 AM    LABGLOM >60 12/13/2022 07:02 AM    GLUCOSE 57 12/13/2022 07:02 AM      sodium phosphate  1 enema Rectal Once    pantoprazole  40 mg Oral BID AC    sucralfate  1 g Oral 4x Daily AC & HS    sodium chloride flush  5-40 mL IntraVENous 2 times per day    amLODIPine  5 mg Oral Daily    docusate sodium  100 mg Oral BID    sodium chloride flush  5-40 mL IntraVENous 2 times per day    enoxaparin  40 mg SubCUTAneous Daily    polyethylene glycol  17 g Oral Daily    senna  1 tablet Oral Nightly     Remains supine in hospital bed awake alert oriented friendly cooperative feeding herself oriented answer questions appropriately no focal neurologic deficits  Assessment:  Patient Active Problem List   Diagnosis    Lumbar compression fracture, closed, [facility]    Constipation    Unable to care for self    Dysphagia    Moderate protein-calorie malnutrition ([facility]     Plan:Continue current care  Sierra Gonzalez MD M.D.

## 2022-12-16 NOTE — PROGRESS NOTES
[facility] Medicine    Subjective:  pt alert conversivec/o chronic constipation + bm according to nursing staff      Current Facility-Administered Medications:     sodium phosphate (FLEET) rectal enema 1 enema, 1 enema, Rectal, Once, Walker Mata DO    pantoprazole (PROTONIX) tablet 40 mg, 40 mg, Oral, BID AC, Elena Joyce MD, 40 mg at 12/16/22 0619    sucralfate (CARAFATE) tablet 1 g, 1 g, Oral, 4x Daily AC & HS, Shayy Hartman MD, 1 g at 12/16/22 1957    sodium chloride flush 0.9 % injection 5-40 mL, 5-40 mL, IntraVENous, 2 times per day, Boyd Torres MD, 10 mL at 12/16/22 0932    sodium chloride flush 0.9 % injection 5-40 mL, 5-40 mL, IntraVENous, PRN, Boyd Torres MD    0.9 % sodium chloride infusion, , IntraVENous, PRN, Boyd Torres MD    traMADol Catheline Means) tablet 50 mg, 50 mg, Oral, Q6H PRN, Shayy Hartman MD, 50 mg at 12/16/22 1131    amLODIPine (NORVASC) tablet 5 mg, 5 mg, Oral, Daily, Shayy Hartman MD, 5 mg at 12/15/22 0849    docusate sodium (COLACE) capsule 100 mg, 100 mg, Oral, BID, Shayy Hartman MD, 100 mg at 12/16/22 0932    sodium chloride flush 0.9 % injection 5-40 mL, 5-40 mL, IntraVENous, 2 times per day, Shayy Hartman MD, 10 mL at 12/15/22 2036    sodium chloride flush 0.9 % injection 5-40 mL, 5-40 mL, IntraVENous, PRN, Shayy Hartman MD    0.9 % sodium chloride infusion, , IntraVENous, PRN, Shayy Hartman MD    enoxaparin (LOVENOX) injection 40 mg, 40 mg, SubCUTAneous, Daily, Shayy Hartman MD, 40 mg at 12/16/22 0932    ondansetron (ZOFRAN-ODT) disintegrating tablet 4 mg, 4 mg, Oral, Q8H PRN, 4 mg at 12/14/22 1002 **OR** ondansetron (ZOFRAN) injection 4 mg, 4 mg, IntraVENous, Q6H PRN, Shayy Hartman MD    acetaminophen (TYLENOL) tablet 650 mg, 650 mg, Oral, Q6H PRN, 650 mg at 12/16/22 0340 **OR** acetaminophen (TYLENOL) suppository 650 mg, 650 mg, Rectal, Q6H PRN, Shayy Hartman MD    polyethylene glycol (GLYCOLAX) packet 17 g, 17 g, Oral, Daily, Sofia Barb Kristi Aguilar MD, 17 g at 12/16/22 0932    senna (SENOKOT) tablet 8.6 mg, 1 tablet, Oral, Nightly, Chani Jiménez MD, 8.6 mg at 12/15/22 2031    Objective:    /65   Pulse 97   Temp 97.1 °F (36.2 °C) (Temporal)   Resp 16   Ht 5' 4\" (1.626 m)   Wt 118 lb (53.5 kg)   SpO2 91%   BMI 20.25 kg/m²     Heart:  reg  Lungs:  ctab  Abd: + bs nondistended  Extrem:  w/o edema    CBC with Differential:    Lab Results   Component Value Date/Time    WBC 4.4 12/13/2022 07:02 AM    RBC 4.21 12/13/2022 07:02 AM    HGB 12.7 12/13/2022 07:02 AM    HCT 40.8 12/13/2022 07:02 AM     12/13/2022 07:02 AM    MCV 96.9 12/13/2022 07:02 AM    MCH 30.2 12/13/2022 07:02 AM    MCHC 31.1 12/13/2022 07:02 AM    RDW 15.9 12/13/2022 07:02 AM    LYMPHOPCT 16.3 12/13/2022 07:02 AM    MONOPCT 10.1 12/13/2022 07:02 AM    BASOPCT 1.4 12/13/2022 07:02 AM    MONOSABS 0.44 12/13/2022 07:02 AM    LYMPHSABS 0.71 12/13/2022 07:02 AM    EOSABS 0.26 12/13/2022 07:02 AM    BASOSABS 0.06 12/13/2022 07:02 AM     CMP:    Lab Results   Component Value Date/Time     12/13/2022 07:02 AM    K 3.9 12/13/2022 07:02 AM     12/13/2022 07:02 AM    CO2 16 12/13/2022 07:02 AM    BUN 2 12/13/2022 07:02 AM    CREATININE 0.4 12/13/2022 07:02 AM    GFRAA >60 11/14/2015 04:10 AM    LABGLOM >60 12/13/2022 07:02 AM    GLUCOSE 57 12/13/2022 07:02 AM    PROT 7.2 12/10/2022 01:34 PM    LABALBU 3.8 12/10/2022 01:34 PM    CALCIUM 9.1 12/13/2022 07:02 AM    BILITOT 0.3 12/10/2022 01:34 PM    ALKPHOS 146 12/10/2022 01:34 PM    AST 22 12/10/2022 01:34 PM    ALT 15 12/10/2022 01:34 PM     Warfarin PT/INR:    Lab Results   Component Value Date    INR 1.0 11/14/2015    PROTIME 10.9 11/14/2015       Assessment:    Principal Problem:    Dysphagia  Active Problems: Moderate protein-calorie malnutrition ([address]  Resolved Problems:    * No resolved hospital problems.  *      Plan:  Dc home outpt f/u outpt phys therapy        Deb Hernandez DO  2:24 PM  12/16/2022

## 2022-12-16 NOTE — CARE COORDINATION
12/16. Discharge plan is to return home with . The pt felt that she needed home care, but her PT/OT scores indicate that she is more suited for outpatient PT-she walked 70  & 95 ft with [facility] SBA and also negotiated stairs lurdes Bangura RN

## 2022-12-16 NOTE — CARE COORDINATION
[email or web address] [employer]. Referral faxed to Kevin Ruvalcaba. Placed call to lacho form [employer].  Miller Reyes RN

## 2022-12-16 NOTE — PROGRESS NOTES
Physical Therapy  Physical Therapy Treatment    Name: Sofía Khan  : 1950  MRN: [id number]      Date of Service: 2022    Evaluating PT:  Anuradha Alas PT, DPT [id number]    Room #:  0775/9542-Q  Diagnosis:  Odynophagia [R13.10]  Dysphagia [R13.10]  Lumbar compression fracture, closed, [facility] [S32.000A]  Dysphagia, unspecified type [R13.10]  Compression fracture of thoracic vertebra, unspecified thoracic vertebral level, initial encounter ([facility] [S22.000A]  PMHx/PSHx:  Osteoporosis, HTN, vertebral compression fracture  Procedure/Surgery:  EGD BIOPSY (2022)  Precautions:  Falls, spinal precautions, TLSO  Equipment Needs:  FWW; TBD  Equipment Owned:  SPC, standard walker    SUBJECTIVE:    Pt lives with  in a 1 story home with 4 stair(s) to enter and 1 rail(s). Bed is on 1st floor and bath is on 1st floor. Pt ambulated using SPC with assistance as needed PTA. OBJECTIVE:   Initial Evaluation  Date: 2022 Treatment  2022 Short Term/ Long Term   Goals   AM-PAC 6 Clicks     Was pt agreeable to Eval/treatment? Yes Yes    Does pt have pain? 7/10 abdomen, back, headache Mild back    Bed Mobility  Rolling: SBA  Supine to sit: SBA  Sit to supine: NT  Scooting: NT Rolling: SBA  Supine to sit: NT  Sit to supine: SBA  Scooting: SBA (seated) Rolling: Independent  Supine to sit: Independent  Sit to supine: Independent  Scooting: Independent   Transfers Sit to stand: SBA  Stand to sit: Priya  Stand pivot: NT Sit to stand: SBA  Stand to sit: SBA  Stand pivot: NT Sit to stand: Mod I  Stand to sit: Mod I  Stand pivot:  Mod I with AAD   Ambulation    15 feet with SPC + HHA Priya  50 feet with [employer] Priya 25' with [employer]  feet with AAD Mod I   Stair negotiation: ascended and descended  NT NT 4 step(s) with 1 rail(s) Mod I   ROM BUE:  See OT note  BLE:  WFL     Strength BUE:  See OT note  BLE:  Grossly 5/5     Balance Sitting EOB:  SBA  Dynamic Standing:  Priya with SPC + HHA; Priya with [employer] Sitting EOB:  SBA  Dynamic Standing:  SBA with [employer] Sitting EOB:  Independent  Dynamic Standing: Mod I with AAD     Pt is A & O x 4  Sensation:  No reports of numbness/tingling to extremities  Edema:  Unremarkable    Patient education  Pt educated on safety during functional mobility. Patient response to education:   Pt verbalized understanding Pt demonstrated skill Pt requires further education in this area   Yes Yes Yes     ASSESSMENT:    Comments:  Patient sitting on bedside commode with TLSO donned upon arrival; agreeable to PT session. Upon standing from bedside commode, static standing performed for short duration while hygiene was completed; subsequently ambulated short distance. Lateral seated scooting performed multiple times in order to improve positioning before returning to supine. Bed mobility performed via log roll technique. Rolling performed several times in order to place/remove bed pan and to replace pad underneath patient due to incontinence of stool. Side-lying positioning maintained for extended periods of time while hygiene was completed. Nursing present to provide assistance. Patient left in semi-Garza's position with TLSO donned, bed pan in place (nursing aware), call light in reach. Treatment:  Patient practiced and was instructed in the following treatment:    Functional transfers - physical assistance provided as needed during activity  Static standing - performed to promote activity tolerance and balance maintenance  Ambulation - verbal cues to facilitate proper positioning, particularly related to walker approximation; physical assistance provided as needed during activity  Bed mobility - verbal cues to facilitate proper positioning and sequencing regarding log roll technique; physical assistance provided as needed during activity    PLAN:    Patient is making good progress towards established goals. Will continue with current POC.       Time in  1507  Time out 1530    Total Treatment Time  23 minutes     CPT codes:  [] Gait training 33545 0 minutes  [] Manual therapy 80507 0 minutes  [x] Therapeutic activities 99467 23 minutes  [] Therapeutic exercises 59417 0 minutes  [] Neuromuscular reeducation 37908 0 minutes    Greg Flynn PT, DPT  [id number]

## 2022-12-16 NOTE — PROGRESS NOTES
Speech Language Pathology      NAME:  Anca Gillette  :  1950  DATE: 2022  ROOM:  5403/5403-B    Pt seen for ongoing dysphagia intervention. Chart review indicates regular/peptic ulcer diet order. Pt reports no trouble with breakfast, although did admit to minimal intake. Pt reported that she takes her medications in applesauce since hospitalization which is new but otherwise denies overt coughing/ throat clearing/ choking or globus sensation. Education provided r/r aspiration precautions. Addendum: Returned at lunch. Pt already consumed lunch (half turkey sandwich on wheat bread) with good toleration reported. Pt did consume milk shake at 40* angle with functional oropharyngeal swallow function. Reviewed aspiration precautions again, good outcome.          Odynophagia [R13.10]  Dysphagia [R13.10]  Lumbar compression fracture, closed[facility] [S32.000A]  Dysphagia, unspecified type [R13.10]  Compression fracture of thoracic vertebra, unspecified thoracic vertebral level, initial encounter ([facility] [S22.000A]    11753  dysphagia tx x2    Jimena Valera., [address]  [id number]  2022

## 2023-01-05 NOTE — DISCHARGE SUMMARY
[address]                  [address] [facility],  [facility]                               DISCHARGE SUMMARY    PATIENT NAME: Malika Gee                  :        1950  MED REC NO:   [id number]                            ROOM:       5208  ACCOUNT NO:   [de-identified]                           ADMIT DATE: 2022  PROVIDER:     Clementine Burton DO                  DISCHARGE DATE:  2022    DISCHARGE DIAGNOSES:  1. Lumbar compression fracture. 2.  Intractable low back pain. 3.  Hypertension. 4.  Constipation. HOSPITAL COURSE:  The patient is a 65-year-old  female, who  presented to the emergency room with complaints of low back pain. She  had an MRI of the lumbar spine as an outpatient, which revealed  compression fracture. She was admitted to the hospital.  She was seen  by Neurosurgery, who recommended bracing. She was discharged to home in  stable condition on 2022. DISCHARGE MEDICATIONS:  As per discharge med rec which include:  1. Colace. 2.  Amlodipine. 3.  Vitamin D.  4.  Norco p.r.n. DISCHARGE INSTRUCTIONS:  The patient instructed to follow up with  Neurosurgery, call office for appointment.         Renetta Dumont DO    D: 2023 14:28:23       T: 2023 14:30:48     [id number]  Job#: [id number]     Doc#: [id number]    CC:

## 2023-11-03 ENCOUNTER — APPOINTMENT (OUTPATIENT)
Dept: CT IMAGING | Age: 73
End: 2023-11-03
Payer: MEDICARE

## 2023-11-03 ENCOUNTER — APPOINTMENT (OUTPATIENT)
Dept: GENERAL RADIOLOGY | Age: 73
End: 2023-11-03
Payer: MEDICARE

## 2023-11-03 ENCOUNTER — HOSPITAL ENCOUNTER (EMERGENCY)
Age: 73
Discharge: HOME | End: 2023-11-03
Attending: STUDENT IN AN ORGANIZED HEALTH CARE EDUCATION/TRAINING PROGRAM
Payer: MEDICARE

## 2023-11-03 VITALS
RESPIRATION RATE: 16 BRPM | HEIGHT: 62 IN | OXYGEN SATURATION: 99 % | BODY MASS INDEX: 21.58 KG/M2 | TEMPERATURE: 97.8 F | SYSTOLIC BLOOD PRESSURE: 126 MMHG | DIASTOLIC BLOOD PRESSURE: 49 MMHG | HEART RATE: 71 BPM

## 2023-11-03 DIAGNOSIS — M26.652 ARTHROPATHY OF LEFT TEMPOROMANDIBULAR JOINT: ICD-10-CM

## 2023-11-03 DIAGNOSIS — M54.2 NECK PAIN ON LEFT SIDE: Primary | ICD-10-CM

## 2023-11-03 LAB
ANION GAP SERPL CALCULATED.3IONS-SCNC: 8 MMOL/L (ref 7–16)
BASOPHILS # BLD: 0.04 K/UL (ref 0–0.2)
BASOPHILS NFR BLD: 1 % (ref 0–2)
BUN SERPL-MCNC: 8 MG/DL (ref 6–23)
CALCIUM SERPL-MCNC: 9.3 MG/DL (ref 8.6–10.2)
CHLORIDE SERPL-SCNC: 101 MMOL/L (ref 98–107)
CO2 SERPL-SCNC: 30 MMOL/L (ref 22–29)
CREAT SERPL-MCNC: 0.7 MG/DL (ref 0.5–1)
EOSINOPHIL # BLD: 0.07 K/UL (ref 0.05–0.5)
EOSINOPHILS RELATIVE PERCENT: 2 % (ref 0–6)
ERYTHROCYTE [DISTWIDTH] IN BLOOD BY AUTOMATED COUNT: 14.4 % (ref 11.5–15)
GFR SERPL CREATININE-BSD FRML MDRD: >60 ML/MIN/1.73M2
GLUCOSE SERPL-MCNC: 82 MG/DL (ref 74–99)
HCT VFR BLD AUTO: 40 % (ref 34–48)
HGB BLD-MCNC: 13.3 G/DL (ref 11.5–15.5)
IMM GRANULOCYTES # BLD AUTO: <0.03 K/UL (ref 0–0.58)
IMM GRANULOCYTES NFR BLD: 0 % (ref 0–5)
LYMPHOCYTES NFR BLD: 0.91 K/UL (ref 1.5–4)
LYMPHOCYTES RELATIVE PERCENT: 25 % (ref 20–42)
MCH RBC QN AUTO: 31.1 PG (ref 26–35)
MCHC RBC AUTO-ENTMCNC: 33.3 G/DL (ref 32–34.5)
MCV RBC AUTO: 93.5 FL (ref 80–99.9)
MONOCYTES NFR BLD: 0.25 K/UL (ref 0.1–0.95)
MONOCYTES NFR BLD: 7 % (ref 2–12)
NEUTROPHILS NFR BLD: 65 % (ref 43–80)
NEUTS SEG NFR BLD: 2.33 K/UL (ref 1.8–7.3)
PLATELET # BLD AUTO: 178 K/UL (ref 130–450)
PMV BLD AUTO: 10.1 FL (ref 7–12)
POTASSIUM SERPL-SCNC: 3.9 MMOL/L (ref 3.5–5)
RBC # BLD AUTO: 4.28 M/UL (ref 3.5–5.5)
SODIUM SERPL-SCNC: 139 MMOL/L (ref 132–146)
WBC OTHER # BLD: 3.6 K/UL (ref 4.5–11.5)

## 2023-11-03 PROCEDURE — 6370000000 HC RX 637 (ALT 250 FOR IP): Performed by: STUDENT IN AN ORGANIZED HEALTH CARE EDUCATION/TRAINING PROGRAM

## 2023-11-03 PROCEDURE — 99285 EMERGENCY DEPT VISIT HI MDM: CPT

## 2023-11-03 PROCEDURE — 70491 CT SOFT TISSUE NECK W/DYE: CPT

## 2023-11-03 PROCEDURE — 71045 X-RAY EXAM CHEST 1 VIEW: CPT

## 2023-11-03 PROCEDURE — 85025 COMPLETE CBC W/AUTO DIFF WBC: CPT

## 2023-11-03 PROCEDURE — 6360000004 HC RX CONTRAST MEDICATION: Performed by: RADIOLOGY

## 2023-11-03 PROCEDURE — 6360000002 HC RX W HCPCS: Performed by: EMERGENCY MEDICINE

## 2023-11-03 PROCEDURE — 70481 CT ORBIT/EAR/FOSSA W/DYE: CPT

## 2023-11-03 PROCEDURE — 80048 BASIC METABOLIC PNL TOTAL CA: CPT

## 2023-11-03 PROCEDURE — 96374 THER/PROPH/DIAG INJ IV PUSH: CPT

## 2023-11-03 RX ORDER — OXYCODONE HYDROCHLORIDE 5 MG/1
5 TABLET ORAL ONCE
Status: COMPLETED | OUTPATIENT
Start: 2023-11-03 | End: 2023-11-03

## 2023-11-03 RX ORDER — MORPHINE SULFATE 2 MG/ML
2 INJECTION, SOLUTION INTRAMUSCULAR; INTRAVENOUS ONCE
Status: COMPLETED | OUTPATIENT
Start: 2023-11-03 | End: 2023-11-03

## 2023-11-03 RX ADMIN — MORPHINE SULFATE 2 MG: 2 INJECTION, SOLUTION INTRAMUSCULAR; INTRAVENOUS at 09:38

## 2023-11-03 RX ADMIN — OXYCODONE HYDROCHLORIDE 5 MG: 5 TABLET ORAL at 06:02

## 2023-11-03 RX ADMIN — IOPAMIDOL 75 ML: 755 INJECTION, SOLUTION INTRAVENOUS at 06:28

## 2023-11-03 ASSESSMENT — ENCOUNTER SYMPTOMS
VOMITING: 0
NAUSEA: 0
BACK PAIN: 0
SHORTNESS OF BREATH: 0
COUGH: 0
ABDOMINAL PAIN: 0
COLOR CHANGE: 0
DIARRHEA: 0

## 2023-11-03 ASSESSMENT — PAIN SCALES - GENERAL
PAINLEVEL_OUTOF10: 5
PAINLEVEL_OUTOF10: 9
PAINLEVEL_OUTOF10: 10

## 2023-11-03 NOTE — ED NOTES
Attempt made at discharging patient. Patient stated pain still is not controlled and began crying. Dr. Danilo Avila made aware of complaint. Awaiting orders.       Marielle Florentino RN  11/03/23 3635

## 2023-12-02 VITALS
HEART RATE: 79 BPM | HEIGHT: 62 IN | OXYGEN SATURATION: 100 % | WEIGHT: 105 LBS | TEMPERATURE: 97.4 F | RESPIRATION RATE: 18 BRPM | SYSTOLIC BLOOD PRESSURE: 106 MMHG | DIASTOLIC BLOOD PRESSURE: 61 MMHG | BODY MASS INDEX: 19.32 KG/M2

## 2023-12-02 PROCEDURE — 99284 EMERGENCY DEPT VISIT MOD MDM: CPT

## 2023-12-02 ASSESSMENT — PAIN DESCRIPTION - FREQUENCY: FREQUENCY: CONTINUOUS

## 2023-12-02 ASSESSMENT — PAIN - FUNCTIONAL ASSESSMENT: PAIN_FUNCTIONAL_ASSESSMENT: 0-10

## 2023-12-02 ASSESSMENT — PAIN SCALES - GENERAL: PAINLEVEL_OUTOF10: 3

## 2023-12-02 ASSESSMENT — PAIN DESCRIPTION - DESCRIPTORS: DESCRIPTORS: DISCOMFORT

## 2023-12-02 ASSESSMENT — PAIN DESCRIPTION - PAIN TYPE: TYPE: ACUTE PAIN

## 2023-12-02 ASSESSMENT — PAIN DESCRIPTION - LOCATION: LOCATION: HEAD

## 2023-12-02 ASSESSMENT — PAIN DESCRIPTION - ONSET: ONSET: ON-GOING

## 2023-12-03 ENCOUNTER — APPOINTMENT (OUTPATIENT)
Dept: CT IMAGING | Age: 73
End: 2023-12-03
Payer: MEDICARE

## 2023-12-03 ENCOUNTER — HOSPITAL ENCOUNTER (EMERGENCY)
Age: 73
Discharge: HOME | End: 2023-12-03
Payer: MEDICARE
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
## 2023-12-03 DIAGNOSIS — S09.90XA CLOSED HEAD INJURY WITHOUT LOSS OF CONSCIOUSNESS, INITIAL ENCOUNTER: Primary | ICD-10-CM

## 2023-12-03 PROCEDURE — 70450 CT HEAD/BRAIN W/O DYE: CPT

## 2023-12-03 PROCEDURE — 72125 CT NECK SPINE W/O DYE: CPT

## 2023-12-03 NOTE — ED PROVIDER NOTES
MAKING    Recap: 50-year-old who fell in her bedroom and struck her right head on a wood frame  History was provided by patient and family and there are no social determinants affecting patient care. Records Reviewed: None  Results/Interventions:   CT of the head is negative for acute process  CT cervical spine negative for fracture    Differential Diagnoses considered but not fully inclusive: Closed head injury without loss of consciousness. Scalp hematoma. Facial contusion. Intracerebral bleed. I am Primary Clinician of Record and case was not discussed with ED Physician. Diagnosis, Disposition and any Prescriptions as follows  All results discussed with patient and family at bedside. Patient is safe for discharge. She is at hospice and does have a nurse that comes regularly to the home. Drinking water and eating applesauce at discharge. Plan of Care/Counseling:  Gloria Prakash reviewed today's visit with the patient in addition to providing specific details for the plan of care and counseling regarding the diagnosis and prognosis. Questions are answered at this time and are agreeable with the plan. ASSESSMENT     1. Closed head injury without loss of consciousness, initial encounter New Problem   2. Fall, initial encounter New Problem     PLAN   Discharged home. Patient condition is good    New Medications     New Prescriptions    No medications on file     Electronically signed by MYLES Prakash   DD: 12/2/23  **This report was transcribed using voice recognition software. Every effort was made to ensure accuracy; however, inadvertent computerized transcription errors may be present.   END OF ED PROVIDER NOTE       Gloria Prakash  12/03/23 8903

## 2023-12-03 NOTE — ED NOTES
FIRST PROVIDER CONTACT ASSESSMENT NOTE      Department of Emergency Medicine   Admit Date: No admission date for patient encounter. Chief Complaint: Fall (Hit head on furniture. No blood thinner)      History of Present Illness:    Yulissa Fair is a 75 y.o. female who presents to the ED for   Fall and hit her face. Patient went to get up out of bed when she lost her balance and fell forward. She struck the right side of her head and face on the wood frame of a mirror. Patient denies loss of consciousness. She is not on blood thinners. She really denies any pain.     She is a hospice patient.        -----------------END OF FIRST PROVIDER CONTACT ASSESSMENT NOTE--------------  Electronically signed by MYLES Nobles   DD: 12/2/23             Gloria Nobles  12/02/23 2245

## 2023-12-19 ENCOUNTER — APPOINTMENT (OUTPATIENT)
Dept: CT IMAGING | Age: 73
DRG: 641 | End: 2023-12-19
Payer: MEDICARE

## 2023-12-19 ENCOUNTER — APPOINTMENT (OUTPATIENT)
Dept: GENERAL RADIOLOGY | Age: 73
DRG: 641 | End: 2023-12-19
Payer: MEDICARE

## 2023-12-19 ENCOUNTER — HOSPITAL ENCOUNTER (INPATIENT)
Age: 73
LOS: 3 days | Discharge: NURSING, REHAB OR LONG-TERM CARE | DRG: 641 | End: 2023-12-22
Attending: STUDENT IN AN ORGANIZED HEALTH CARE EDUCATION/TRAINING PROGRAM | Admitting: INTERNAL MEDICINE
Payer: MEDICARE

## 2023-12-19 DIAGNOSIS — R00.1 SINUS BRADYCARDIA: ICD-10-CM

## 2023-12-19 DIAGNOSIS — M54.50 LOW BACK PAIN WITHOUT SCIATICA, UNSPECIFIED BACK PAIN LATERALITY, UNSPECIFIED CHRONICITY: ICD-10-CM

## 2023-12-19 DIAGNOSIS — S32.000A LUMBAR COMPRESSION FRACTURE, CLOSED, INITIAL ENCOUNTER (HCC): ICD-10-CM
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
## 2023-12-19 DIAGNOSIS — R26.2 DIFFICULTY IN WALKING: ICD-10-CM

## 2023-12-19 DIAGNOSIS — R13.10 DYSPHAGIA, UNSPECIFIED TYPE: Primary | ICD-10-CM

## 2023-12-19 PROBLEM — R62.7 FAILURE TO THRIVE IN ADULT: Status: ACTIVE | Noted: 2023-12-19

## 2023-12-19 LAB
ALBUMIN SERPL-MCNC: 3.4 G/DL (ref 3.5–5.2)
ALP SERPL-CCNC: 51 U/L (ref 35–104)
ALT SERPL-CCNC: 15 U/L (ref 0–32)
ANION GAP SERPL CALCULATED.3IONS-SCNC: 11 MMOL/L (ref 7–16)
AST SERPL-CCNC: 22 U/L (ref 0–31)
BASOPHILS # BLD: 0.03 K/UL (ref 0–0.2)
BASOPHILS NFR BLD: 1 % (ref 0–2)
BILIRUB SERPL-MCNC: 0.5 MG/DL (ref 0–1.2)
BUN SERPL-MCNC: 23 MG/DL (ref 6–23)
CALCIUM SERPL-MCNC: 9.8 MG/DL (ref 8.6–10.2)
CHLORIDE SERPL-SCNC: 99 MMOL/L (ref 98–107)
CO2 SERPL-SCNC: 28 MMOL/L (ref 22–29)
CREAT SERPL-MCNC: 0.9 MG/DL (ref 0.5–1)
EOSINOPHIL # BLD: 0.06 K/UL (ref 0.05–0.5)
EOSINOPHILS RELATIVE PERCENT: 1 % (ref 0–6)
ERYTHROCYTE [DISTWIDTH] IN BLOOD BY AUTOMATED COUNT: 15.5 % (ref 11.5–15)
GFR SERPL CREATININE-BSD FRML MDRD: >60 ML/MIN/1.73M2
GLUCOSE SERPL-MCNC: 74 MG/DL (ref 74–99)
HCT VFR BLD AUTO: 40.3 % (ref 34–48)
HGB BLD-MCNC: 13.4 G/DL (ref 11.5–15.5)
IMM GRANULOCYTES # BLD AUTO: 0.04 K/UL (ref 0–0.58)
IMM GRANULOCYTES NFR BLD: 1 % (ref 0–5)
INR PPP: 1.1
LYMPHOCYTES NFR BLD: 1.02 K/UL (ref 1.5–4)
LYMPHOCYTES RELATIVE PERCENT: 17 % (ref 20–42)
MAGNESIUM SERPL-MCNC: 2.5 MG/DL (ref 1.6–2.6)
MCH RBC QN AUTO: 30.9 PG (ref 26–35)
MCHC RBC AUTO-ENTMCNC: 33.3 G/DL (ref 32–34.5)
MCV RBC AUTO: 93.1 FL (ref 80–99.9)
MONOCYTES NFR BLD: 0.37 K/UL (ref 0.1–0.95)
MONOCYTES NFR BLD: 6 % (ref 2–12)
NEUTROPHILS NFR BLD: 75 % (ref 43–80)
NEUTS SEG NFR BLD: 4.65 K/UL (ref 1.8–7.3)
PLATELET, FLUORESCENCE: 129 K/UL (ref 130–450)
PMV BLD AUTO: 10.2 FL (ref 7–12)
POTASSIUM SERPL-SCNC: 4.1 MMOL/L (ref 3.5–5)
PROT SERPL-MCNC: 5.9 G/DL (ref 6.4–8.3)
PROTHROMBIN TIME: 12.7 SEC (ref 9.3–12.4)
RBC # BLD AUTO: 4.33 M/UL (ref 3.5–5.5)
SODIUM SERPL-SCNC: 138 MMOL/L (ref 132–146)
TROPONIN I SERPL HS-MCNC: 23 NG/L (ref 0–9)
WBC OTHER # BLD: 6.2 K/UL (ref 4.5–11.5)

## 2023-12-19 PROCEDURE — 72125 CT NECK SPINE W/O DYE: CPT

## 2023-12-19 PROCEDURE — 1200000000 HC SEMI PRIVATE

## 2023-12-19 PROCEDURE — 80053 COMPREHEN METABOLIC PANEL: CPT

## 2023-12-19 PROCEDURE — 99285 EMERGENCY DEPT VISIT HI MDM: CPT

## 2023-12-19 PROCEDURE — 83735 ASSAY OF MAGNESIUM: CPT

## 2023-12-19 PROCEDURE — 2580000003 HC RX 258: Performed by: STUDENT IN AN ORGANIZED HEALTH CARE EDUCATION/TRAINING PROGRAM

## 2023-12-19 PROCEDURE — 73521 X-RAY EXAM HIPS BI 2 VIEWS: CPT

## 2023-12-19 PROCEDURE — 72131 CT LUMBAR SPINE W/O DYE: CPT

## 2023-12-19 PROCEDURE — 85610 PROTHROMBIN TIME: CPT

## 2023-12-19 PROCEDURE — 6370000000 HC RX 637 (ALT 250 FOR IP): Performed by: STUDENT IN AN ORGANIZED HEALTH CARE EDUCATION/TRAINING PROGRAM

## 2023-12-19 PROCEDURE — 93005 ELECTROCARDIOGRAM TRACING: CPT | Performed by: STUDENT IN AN ORGANIZED HEALTH CARE EDUCATION/TRAINING PROGRAM

## 2023-12-19 PROCEDURE — 85025 COMPLETE CBC W/AUTO DIFF WBC: CPT

## 2023-12-19 PROCEDURE — 72128 CT CHEST SPINE W/O DYE: CPT

## 2023-12-19 PROCEDURE — 71045 X-RAY EXAM CHEST 1 VIEW: CPT

## 2023-12-19 PROCEDURE — 84484 ASSAY OF TROPONIN QUANT: CPT

## 2023-12-19 PROCEDURE — 70450 CT HEAD/BRAIN W/O DYE: CPT

## 2023-12-19 RX ORDER — OXYCODONE HYDROCHLORIDE 5 MG/1
10 TABLET ORAL ONCE
Status: COMPLETED | OUTPATIENT
Start: 2023-12-19 | End: 2023-12-19

## 2023-12-19 RX ORDER — 0.9 % SODIUM CHLORIDE 0.9 %
1000 INTRAVENOUS SOLUTION INTRAVENOUS ONCE
Status: COMPLETED | OUTPATIENT
Start: 2023-12-19 | End: 2023-12-19

## 2023-12-19 RX ORDER — HYDROCODONE BITARTRATE AND ACETAMINOPHEN 5; 325 MG/1; MG/1
1 TABLET ORAL ONCE
Status: DISCONTINUED | OUTPATIENT
Start: 2023-12-19 | End: 2023-12-19

## 2023-12-19 RX ORDER — OXYCODONE HYDROCHLORIDE AND ACETAMINOPHEN 5; 325 MG/1; MG/1
1 TABLET ORAL ONCE
Status: DISCONTINUED | OUTPATIENT
Start: 2023-12-19 | End: 2023-12-19

## 2023-12-19 RX ADMIN — SODIUM CHLORIDE 1000 ML: 9 INJECTION, SOLUTION INTRAVENOUS at 19:40

## 2023-12-19 RX ADMIN — OXYCODONE 10 MG: 5 TABLET ORAL at 22:49

## 2023-12-20 PROBLEM — E43 SEVERE PROTEIN-CALORIE MALNUTRITION (HCC): Chronic | Status: ACTIVE | Noted: 2023-12-20

## 2023-12-20 LAB
EKG ATRIAL RATE: 43 BPM
EKG P AXIS: 85 DEGREES
EKG P-R INTERVAL: 172 MS
EKG Q-T INTERVAL: 452 MS
EKG QRS DURATION: 56 MS
EKG QTC CALCULATION (BAZETT): 381 MS
EKG R AXIS: 69 DEGREES
EKG T AXIS: 77 DEGREES
EKG VENTRICULAR RATE: 43 BPM

## 2023-12-20 PROCEDURE — 93010 ELECTROCARDIOGRAM REPORT: CPT | Performed by: INTERNAL MEDICINE

## 2023-12-20 PROCEDURE — 97161 PT EVAL LOW COMPLEX 20 MIN: CPT

## 2023-12-20 PROCEDURE — 2580000003 HC RX 258: Performed by: FAMILY MEDICINE

## 2023-12-20 PROCEDURE — 92610 EVALUATE SWALLOWING FUNCTION: CPT

## 2023-12-20 PROCEDURE — 6370000000 HC RX 637 (ALT 250 FOR IP): Performed by: NURSE PRACTITIONER

## 2023-12-20 PROCEDURE — 97165 OT EVAL LOW COMPLEX 30 MIN: CPT

## 2023-12-20 PROCEDURE — 1200000000 HC SEMI PRIVATE

## 2023-12-20 PROCEDURE — 92526 ORAL FUNCTION THERAPY: CPT

## 2023-12-20 PROCEDURE — 6370000000 HC RX 637 (ALT 250 FOR IP): Performed by: FAMILY MEDICINE

## 2023-12-20 PROCEDURE — 6360000002 HC RX W HCPCS: Performed by: FAMILY MEDICINE

## 2023-12-20 RX ORDER — OXYCODONE HYDROCHLORIDE 15 MG/1
15 TABLET ORAL EVERY 4 HOURS PRN
Status: DISCONTINUED | OUTPATIENT
Start: 2023-12-20 | End: 2023-12-22 | Stop reason: HOSPADM

## 2023-12-20 RX ORDER — PANTOPRAZOLE SODIUM 40 MG/1
40 TABLET, DELAYED RELEASE ORAL
Status: DISCONTINUED | OUTPATIENT
Start: 2023-12-20 | End: 2023-12-22 | Stop reason: HOSPADM

## 2023-12-20 RX ORDER — ONDANSETRON 4 MG/1
4 TABLET, ORALLY DISINTEGRATING ORAL EVERY 8 HOURS PRN
Status: DISCONTINUED | OUTPATIENT
Start: 2023-12-20 | End: 2023-12-22 | Stop reason: HOSPADM

## 2023-12-20 RX ORDER — HEPARIN SODIUM 10000 [USP'U]/ML
5000 INJECTION, SOLUTION INTRAVENOUS; SUBCUTANEOUS 2 TIMES DAILY
Status: DISCONTINUED | OUTPATIENT
Start: 2023-12-20 | End: 2023-12-22 | Stop reason: HOSPADM

## 2023-12-20 RX ORDER — OXYCODONE HYDROCHLORIDE 5 MG/1
15 TABLET ORAL EVERY 4 HOURS PRN
Status: ON HOLD | COMMUNITY
Start: 2023-11-28 | End: 2023-12-22

## 2023-12-20 RX ORDER — SODIUM CHLORIDE 9 MG/ML
INJECTION, SOLUTION INTRAVENOUS PRN
Status: DISCONTINUED | OUTPATIENT
Start: 2023-12-20 | End: 2023-12-22 | Stop reason: HOSPADM

## 2023-12-20 RX ORDER — ONDANSETRON 2 MG/ML
4 INJECTION INTRAMUSCULAR; INTRAVENOUS EVERY 6 HOURS PRN
Status: DISCONTINUED | OUTPATIENT
Start: 2023-12-20 | End: 2023-12-22 | Stop reason: HOSPADM

## 2023-12-20 RX ORDER — ACETAMINOPHEN 650 MG/1
650 SUPPOSITORY RECTAL EVERY 6 HOURS PRN
Status: DISCONTINUED | OUTPATIENT
Start: 2023-12-20 | End: 2023-12-22 | Stop reason: HOSPADM

## 2023-12-20 RX ORDER — ACETAMINOPHEN 325 MG/1
650 TABLET ORAL EVERY 6 HOURS PRN
Status: DISCONTINUED | OUTPATIENT
Start: 2023-12-20 | End: 2023-12-22 | Stop reason: HOSPADM

## 2023-12-20 RX ORDER — SODIUM CHLORIDE 0.9 % (FLUSH) 0.9 %
10 SYRINGE (ML) INJECTION PRN
Status: DISCONTINUED | OUTPATIENT
Start: 2023-12-20 | End: 2023-12-22 | Stop reason: HOSPADM

## 2023-12-20 RX ORDER — SUCRALFATE 1 G/1
1 TABLET ORAL
Status: DISCONTINUED | OUTPATIENT
Start: 2023-12-20 | End: 2023-12-22 | Stop reason: HOSPADM

## 2023-12-20 RX ORDER — POLYETHYLENE GLYCOL 3350 17 G/17G
17 POWDER, FOR SOLUTION ORAL DAILY PRN
Status: DISCONTINUED | OUTPATIENT
Start: 2023-12-20 | End: 2023-12-22 | Stop reason: HOSPADM

## 2023-12-20 RX ORDER — SODIUM CHLORIDE 0.9 % (FLUSH) 0.9 %
5-40 SYRINGE (ML) INJECTION EVERY 12 HOURS SCHEDULED
Status: DISCONTINUED | OUTPATIENT
Start: 2023-12-20 | End: 2023-12-22 | Stop reason: HOSPADM

## 2023-12-20 RX ADMIN — HEPARIN SODIUM 5000 UNITS: 10000 INJECTION INTRAVENOUS; SUBCUTANEOUS at 20:24

## 2023-12-20 RX ADMIN — OXYCODONE 15 MG: 15 TABLET ORAL at 11:50

## 2023-12-20 RX ADMIN — SUCRALFATE 1 G: 1 TABLET ORAL at 16:24

## 2023-12-20 RX ADMIN — SUCRALFATE 1 G: 1 TABLET ORAL at 20:17

## 2023-12-20 RX ADMIN — SODIUM CHLORIDE, PRESERVATIVE FREE 10 ML: 5 INJECTION INTRAVENOUS at 20:18

## 2023-12-20 RX ADMIN — OXYCODONE 15 MG: 15 TABLET ORAL at 20:18

## 2023-12-20 RX ADMIN — PANTOPRAZOLE SODIUM 40 MG: 40 TABLET, DELAYED RELEASE ORAL at 06:14

## 2023-12-20 RX ADMIN — SUCRALFATE 1 G: 1 TABLET ORAL at 06:14

## 2023-12-20 RX ADMIN — SUCRALFATE 1 G: 1 TABLET ORAL at 11:50

## 2023-12-20 RX ADMIN — SODIUM CHLORIDE, PRESERVATIVE FREE 10 ML: 5 INJECTION INTRAVENOUS at 08:53

## 2023-12-20 RX ADMIN — HEPARIN SODIUM 5000 UNITS: 10000 INJECTION INTRAVENOUS; SUBCUTANEOUS at 08:51

## 2023-12-20 RX ADMIN — PANTOPRAZOLE SODIUM 40 MG: 40 TABLET, DELAYED RELEASE ORAL at 16:24

## 2023-12-20 NOTE — PROGRESS NOTES
OCCUPATIONAL THERAPY INITIAL EVALUATION    [address]   [address] [facility], [address] [place] [address]        OEKB:                                                  Patient Name: Bernice Carroll    MRN: [id number]    : 1950    Room: [address] [place] [address]     Evaluating OT: Teressa Harding OTR/L #[id number]    Referring Provider:  ENRICO Harris CNP     Specific Provider Orders/Date:  OT Eval and Treat, 23     Diagnosis:   1. Dysphagia, unspecified type    2. Fall from standing, initial encounter    3. Low back pain without sciatica, unspecified back pain laterality, unspecified chronicity    4. Difficulty in walking    5.  Sinus bradycardia         Surgery: None       Pertinent Medical History: Osteoporosis, HTN, thoracic compression deformities       Precautions:  Fall Risk, falls and alarm     CT Thoracic Spine 23: Multilevel compression deformities which appear to of compressed compared to  prior study of 12/10/2022     Assessment of current deficits    [x] Functional mobility  [x]ADLs  [x] Strength               []Cognition    [x] Functional transfers   [x] IADLs         [x] Safety Awareness   [x]Endurance    [] Fine Coordination              [x] Balance      [] Vision/perception   []Sensation     []Gross Motor Coordination  [] ROM  [] Delirium                   [] Motor Control     OT PLAN OF CARE   OT POC based on physician orders, patient diagnosis and results of clinical assessment    Frequency/Duration 1-3 days/wk for 2 weeks PRN     Specific OT Treatment Interventions to include:   * Instruction/training on adapted ADL techniques and AE recommendations to increase functional independence within precautions       * Training on energy conservation strategies, correct breathing pattern and techniques to improve independence/tolerance for self-care routine  * Functional transfer/mobility training/DME recommendations for increased Supervision    Bed Mobility  Supine to sit: Stand by Assist   Sit to supine: Stand by Assist     Supine to sit: Independent   Sit to supine: Independent    Functional Transfers Sit to stand: Stand by Assist   Stand to sit: Stand by Assist    Transfer training with verbal cues for hand placement to improve safety. Independent   Functional Mobility Minimal Assist with wheeled walker to improve balance to/from bathroom, verbal cues for walker sequence and safety. Supervision with use of wheeled walker    Balance Sitting:     Static: fair plus     Dynamic: fair plus   Standing: fair plus with walker     Sitting:     Static: good    Dynamic: good  Standing: good with walker    Activity Tolerance fair    Increase standing tolerance >3  minutes for improved engagement with functional transfers and indep in ADLs     Visual/  Perceptual Glasses: Readers     Reports changes in vision since admission: No      NA      Hand Dominance: Right      AROM (PROM) Strength Additional Info:  Goal:   RUE  WFL 3+/5 good  and wfl FMC/dexterity noted during ADL tasks   Improve overall RUE strength  for participation in functional tasks   LUE WFL 3+/5 good  and wfl FMC/dexterity noted during ADL tasks   Improve overall LUE strength  for participation in functional tasks     Hearing:  [facility]   Sensation:   No c/o numbness or tingling  Tone:  WFL   Edema: Mild water retention in  B feet L>R     Comments: RN cleared patient for OT. Upon arrival patient in supine, spouse at bedside. Therapist facilitated and instructed pt on adapted  techniques & compensatory strategies to improve safety and independence with basic ADLs, bed mobility, functional transfers and mobility to allow pt to achieve highest level of independence and safely. Pt demonstrated fair understanding of education & follow through. At end of session, patient was in supine, alarm on, with call light and phone within reach, all lines and tubes intact.   Overall,

## 2023-12-20 NOTE — ED PROVIDER NOTES
Sun        Pt Name: Michael Gonsales  MRN: [id number]  [address] 1950  Date of evaluation: 12/19/2023  Provider: Estel Lombard, DO  PCP: Melita Oviedo MD  Note Started: 8:49 PM EST 12/19/23    CHIEF COMPLAINT       Chief Complaint   Patient presents with    Albertus Blackbird tonight onto tile floor, hit head, no loc, on thinners, having back and head pain       HISTORY OF PRESENT ILLNESS: 1 or more Elements     Limitations to history : None    Michael Gonsales is a 75 y.o. female with PMHx of moderate protein calorie malnutrition, history of closed lumbar compression fracture, HLD, HTN, who presents for evaluation of fall leading to back pain. Patient states that she hit her head on the floor; she states she is supposed to use a walker at baseline, she was trying to get something out of the refrigerator when she stayed away from her walker, lost her balance and fell backwards. She is not on blood thinning medications. She denies loss of consciousness. Her  assisted her off the floor. Patient denies any preceding symptoms including chest pain palpitations or shortness of breath. Denies any lightheadedness, dizziness or changes in vision, denies any abdominal pain nausea vomiting or diarrhea. She does report recent issues with dysphagia and states that she has been having difficulties swallowing; this is worsened over the past week or so. Of note, patient's  reports that she is on hospice and is DNR [facility], they are unclear as to what specific hospice diagnosis she has. Nursing Notes were all reviewed and agreed with or any disagreements were addressed in the HPI. REVIEW OF EXTERNAL NOTE :       Telemedicine visit on 1/10/2023 when patient was evaluated for back pain and difficulty walking.     Chart Review/External Note Review    Last Echo reviewed by Me:  No results found for: [place] Coma Scale  Eye Opening: Spontaneous  Best Verbal Response: Oriented  Best Motor Response: Obeys commands  [place] Coma Scale Score: 15                CIWA Assessment  BP: 99/62  Pulse: (!) 41           PHYSICAL EXAM  1 or more Elements     Physical Exam  Vitals and nursing note reviewed. Constitutional:       General: She is not in acute distress. Appearance: She is ill-appearing (Very frail, chronically ill-appearing). HENT:      Head: Normocephalic and atraumatic. Right Ear: External ear normal.      Left Ear: External ear normal.      Nose: Nose normal. No rhinorrhea. Mouth/Throat:      Mouth: Mucous membranes are dry. Pharynx: Oropharynx is clear. Eyes:      Extraocular Movements: Extraocular movements intact. Conjunctiva/sclera: Conjunctivae normal.      Pupils: Pupils are equal, round, and reactive to light. Cardiovascular:      Rate and Rhythm: Regular rhythm. Bradycardia present. Pulses: Normal pulses. Heart sounds: Normal heart sounds. Pulmonary:      Effort: Pulmonary effort is normal. No respiratory distress. Breath sounds: Normal breath sounds. No wheezing or rales. Abdominal:      Palpations: Abdomen is soft. Tenderness: There is no abdominal tenderness. There is no guarding. Musculoskeletal:      Cervical back: Normal range of motion and neck supple. Back:       Right lower leg: No edema. Left lower leg: No edema. Comments: Diffuse midline spinal tenderness at the lower thoracic and upper/mid lumbar region; no step-offs or deformities   Skin:     General: Skin is warm and dry. Capillary Refill: Capillary refill takes less than 2 seconds. Coloration: Skin is not jaundiced or pale. Neurological:      General: No focal deficit present. Mental Status: She is alert and oriented to person, place, and time. Sensory: No sensory deficit. Motor: No weakness.    Psychiatric:         Mood and

## 2023-12-20 NOTE — PROGRESS NOTES
4 Eyes Skin Assessment     NAME:  Tiffanie Yanes OF BIRTH:  1950  MEDICAL RECORD NUMBER:  [id number]    The patient is being assessed for  Admission    I agree that at least one RN has performed a thorough Head to Toe Skin Assessment on the patient. ALL assessment sites listed below have been assessed. Areas assessed by both nurses:    Head, Face, Ears, Shoulders, Back, Chest, Arms, Elbows, Hands, Sacrum. Buttock, Coccyx, Ischium, Legs. Feet and Heels, and Under Medical Devices         Does the Patient have a Wound?  No noted wound(s)       Maxx Prevention initiated by RN: Yes  Wound Care Orders initiated by RN: No    Pressure Injury (Stage 3,4, Unstageable, DTI, NWPT, and Complex wounds) if present, place Wound referral order by RN under : No    New Ostomies, if present place, Ostomy referral order under : No     Nurse 1 eSignature: Electronically signed by Lobo Castillo RN on 12/20/23 at 7:01 AM EST    **SHARE this note so that the co-signing nurse can place an eSignature**    Nurse 2 eSignature: Electronically signed by Griffin Arias RN on 12/20/23 at 8:02 AM EST

## 2023-12-20 NOTE — CARE COORDINATION
2023  Social Work Discharge Plannin,helio, discharge destination and transport form are completed. Electronically signed by Thane Cabot, LSW on 2023 at 3:22 PM

## 2023-12-20 NOTE — CONSULTS
Comprehensive Nutrition Assessment    Type and Reason for Visit:  Initial, Consult    Nutrition Recommendations/Plan:   PO diet per SLP recommendation  Continue ONS with all meals, as tolerated  Continue inpatient monitoring     Malnutrition Assessment:  Malnutrition Status:  Severe malnutrition (12/20/23 1108)    Context:  Chronic Illness     Findings of the 6 clinical characteristics of malnutrition:  Energy Intake:  75% or less estimated energy requirements for 1 month or longer  Weight Loss:  Greater than 20% over 1 year     Body Fat Loss:  Severe body fat loss Triceps   Muscle Mass Loss:  Severe muscle mass loss Clavicles (pectoralis & deltoids), Calf (gastrocnemius)  Fluid Accumulation:  Unable to assess Extremities (+2 edema may be nutritional but I/O not available and albumin/total protein only slightly low on 12/19 labs)   Strength:       Nutrition Assessment:    Pt admit s/p fall at home and reporting recent dysphagia. Admit x 1 yr ago for compression frxs and dysphagia. at that time, she had EGD w/ evidence of ulcer's, hiatal hernia, possible Aleman's; PMhx of HTN, HLD; SLP had followed her at that time with clinical evaluation/tx. Currently Swallow Eval order pending. Pt meets criteria for severe malnutrition AEB fat/muscle wasting, low BMI. Pt had been on Hospice at home PTA. Will add ONS to all meals and continue to monitor for SLP recommendation. If pt/ choose to continue Hospice, no nutrition support recommendations appropriate. Nutrition Related Findings:    A&Ox4, +2 edema, reported dysphagia, poor appetite, I/O not available, abd w/hypo BS Wound Type: None       Current Nutrition Intake & Therapies:    Average Meal Intake: 1-25% (PTA)  Average Supplements Intake: None Ordered  ADULT DIET;  Regular  ADULT ORAL NUTRITION SUPPLEMENT; Breakfast; Standard High Calorie/High Protein Oral Supplement  ADULT ORAL NUTRITION SUPPLEMENT; Lunch, Dinner; Frozen Oral Supplement    Anthropometric

## 2023-12-20 NOTE — ACP (ADVANCE CARE PLANNING)
Advance Care Planning   Healthcare Decision Maker:    Primary Decision Maker: Paul Wise [facility] - [phone]    Sohail Arriaga.

## 2023-12-20 NOTE — PROGRESS NOTES
SPEECH/LANGUAGE PATHOLOGY  CLINICAL ASSESSMENT OF SWALLOWING FUNCTION   and PLAN OF CARE    PATIENT NAME:  Raphael Faye  (female)     MRN:  [id number]    :  1950  (75 y.o.)  STATUS:  Inpatient: Room 0535/0535-A    TODAY'S DATE:  2023  REFERRING PROVIDER:   FANTASMA Tellez  SPECIFIC PROVIDER ORDER: SLP swallowing-dysphagia evaluation and treatment Date of order:  23  REASON FOR REFERRAL: Assess swallow function    EVALUATING THERAPIST: GERALD Olea                 RESULTS:    DYSPHAGIA DIAGNOSIS:   Clinical indicators of questionable pharyngeal phase dysphagia     Per chart review, patient with reports of globus sensation ~1 year ago. Patient again endorses food sticking at the level of the thyroid notch. When discussing recommendation for puree until MBSS can be completed, because of report of food sticking can be completed patient stated that she did not want to do purees and would rather eat tiny bites at a time. DIET RECOMMENDATIONS:  Easy to chew consistency solids (IDDSI level 7, transitional) with  thin liquids (IDDSI level 0) IF CLEARED BY PHYSICIAN    Discussed with patient that if any increase in difficulty is noted with solids, than switching to purees is pertinent until MBSS is completed. FEEDING RECOMMENDATIONS:     Assistance level:  Set-up is required for all oral intake      Compensatory strategies recommended: Upright in bed/ chair as tolerated, Fully alert for all PO, and Small bites/sips      Discussed recommendations with nursing and/or faxed report to referring provider: Nursing not available, diet change recommendation placed in Physician sticky note section     SPEECH THERAPY  PLAN OF CARE   The dysphagia POC is established based on physician order, dysphagia diagnosis and results of clinical assessment     Will establish POC once MBSS is completed.     Conditions Requiring Skilled Therapeutic Intervention for dysphagia:    Patient is

## 2023-12-20 NOTE — DISCHARGE INSTR - DIET

## 2023-12-20 NOTE — PLAN OF CARE
Problem: Safety - Adult  Goal: Free from fall injury  12/20/2023 1306 by Kassi Romero RN  Outcome: Progressing  12/20/2023 0701 by Mary More RN  Outcome: Progressing

## 2023-12-20 NOTE — CARE COORDINATION
Met with pt/ spouse at bedside. pt resides with spouse ; using cane/ ww at home more frequently as of late. Fall at home x1; PCP . pt active with [facility]( verified with VICENTE)provides bathing/ pain management/ vss 2 x /week. Admitted for dysphagia; await PT/OT evals. Pt/spouseagreeable to rehab if recommended. Also; if home is plan, would like to continue with [facility]; would need new orders for PT/OT /skilled/ palliative. Await evals and will follow. Needs TBD. Gerson Benton. [facility] PT/OT 17/24; pt requesting skilled rehab. [facility]; #1 [facility] #2 [facility];#3 [facility] #4 [facility]. Referral called to [facility] for review. Advised to initiate precert if accepted. Transport packet on [place]. [place] [id number]. [facility]erson eBnton. Cassie Rodriguez at [facility] has accepted; will initiate precert. [facility]erson Benton.

## 2023-12-21 LAB
ALBUMIN SERPL-MCNC: 3 G/DL (ref 3.5–5.2)
ALP SERPL-CCNC: 45 U/L (ref 35–104)
ALT SERPL-CCNC: 15 U/L (ref 0–32)
ANION GAP SERPL CALCULATED.3IONS-SCNC: 9 MMOL/L (ref 7–16)
AST SERPL-CCNC: 17 U/L (ref 0–31)
BASOPHILS # BLD: 0.02 K/UL (ref 0–0.2)
BASOPHILS NFR BLD: 0 % (ref 0–2)
BILIRUB SERPL-MCNC: 0.4 MG/DL (ref 0–1.2)
BUN SERPL-MCNC: 22 MG/DL (ref 6–23)
CALCIUM SERPL-MCNC: 8.8 MG/DL (ref 8.6–10.2)
CHLORIDE SERPL-SCNC: 102 MMOL/L (ref 98–107)
CO2 SERPL-SCNC: 27 MMOL/L (ref 22–29)
CREAT SERPL-MCNC: 0.9 MG/DL (ref 0.5–1)
EOSINOPHIL # BLD: 0.06 K/UL (ref 0.05–0.5)
EOSINOPHILS RELATIVE PERCENT: 1 % (ref 0–6)
ERYTHROCYTE [DISTWIDTH] IN BLOOD BY AUTOMATED COUNT: 15.6 % (ref 11.5–15)
GFR SERPL CREATININE-BSD FRML MDRD: >60 ML/MIN/1.73M2
GLUCOSE SERPL-MCNC: 67 MG/DL (ref 74–99)
HCT VFR BLD AUTO: 35.1 % (ref 34–48)
HGB BLD-MCNC: 11.8 G/DL (ref 11.5–15.5)
IMM GRANULOCYTES # BLD AUTO: 0.03 K/UL (ref 0–0.58)
IMM GRANULOCYTES NFR BLD: 1 % (ref 0–5)
LYMPHOCYTES NFR BLD: 0.88 K/UL (ref 1.5–4)
LYMPHOCYTES RELATIVE PERCENT: 18 % (ref 20–42)
MCH RBC QN AUTO: 31.6 PG (ref 26–35)
MCHC RBC AUTO-ENTMCNC: 33.6 G/DL (ref 32–34.5)
MCV RBC AUTO: 94.1 FL (ref 80–99.9)
MONOCYTES NFR BLD: 0.34 K/UL (ref 0.1–0.95)
MONOCYTES NFR BLD: 7 % (ref 2–12)
NEUTROPHILS NFR BLD: 73 % (ref 43–80)
NEUTS SEG NFR BLD: 3.65 K/UL (ref 1.8–7.3)
PLATELET, FLUORESCENCE: 108 K/UL (ref 130–450)
PMV BLD AUTO: 10.5 FL (ref 7–12)
POTASSIUM SERPL-SCNC: 3.6 MMOL/L (ref 3.5–5)
PROT SERPL-MCNC: 5 G/DL (ref 6.4–8.3)
RBC # BLD AUTO: 3.73 M/UL (ref 3.5–5.5)
SODIUM SERPL-SCNC: 138 MMOL/L (ref 132–146)
WBC OTHER # BLD: 5 K/UL (ref 4.5–11.5)

## 2023-12-21 PROCEDURE — 6370000000 HC RX 637 (ALT 250 FOR IP): Performed by: NURSE PRACTITIONER

## 2023-12-21 PROCEDURE — 6360000002 HC RX W HCPCS: Performed by: FAMILY MEDICINE

## 2023-12-21 PROCEDURE — 36415 COLL VENOUS BLD VENIPUNCTURE: CPT

## 2023-12-21 PROCEDURE — 2580000003 HC RX 258: Performed by: FAMILY MEDICINE

## 2023-12-21 PROCEDURE — 80053 COMPREHEN METABOLIC PANEL: CPT

## 2023-12-21 PROCEDURE — 85025 COMPLETE CBC W/AUTO DIFF WBC: CPT

## 2023-12-21 PROCEDURE — 6370000000 HC RX 637 (ALT 250 FOR IP): Performed by: FAMILY MEDICINE

## 2023-12-21 PROCEDURE — 1200000000 HC SEMI PRIVATE

## 2023-12-21 RX ADMIN — OXYCODONE 15 MG: 15 TABLET ORAL at 02:44

## 2023-12-21 RX ADMIN — SUCRALFATE 1 G: 1 TABLET ORAL at 11:51

## 2023-12-21 RX ADMIN — SUCRALFATE 1 G: 1 TABLET ORAL at 06:31

## 2023-12-21 RX ADMIN — PANTOPRAZOLE SODIUM 40 MG: 40 TABLET, DELAYED RELEASE ORAL at 16:08

## 2023-12-21 RX ADMIN — PANTOPRAZOLE SODIUM 40 MG: 40 TABLET, DELAYED RELEASE ORAL at 06:32

## 2023-12-21 RX ADMIN — HEPARIN SODIUM 5000 UNITS: 10000 INJECTION INTRAVENOUS; SUBCUTANEOUS at 20:09

## 2023-12-21 RX ADMIN — SODIUM CHLORIDE, PRESERVATIVE FREE 10 ML: 5 INJECTION INTRAVENOUS at 20:09

## 2023-12-21 RX ADMIN — OXYCODONE 15 MG: 15 TABLET ORAL at 20:08

## 2023-12-21 RX ADMIN — SUCRALFATE 1 G: 1 TABLET ORAL at 16:07

## 2023-12-21 RX ADMIN — SUCRALFATE 1 G: 1 TABLET ORAL at 20:09

## 2023-12-21 RX ADMIN — HEPARIN SODIUM 5000 UNITS: 10000 INJECTION INTRAVENOUS; SUBCUTANEOUS at 08:55

## 2023-12-21 RX ADMIN — OXYCODONE 15 MG: 15 TABLET ORAL at 16:13

## 2023-12-21 RX ADMIN — SODIUM CHLORIDE, PRESERVATIVE FREE 10 ML: 5 INJECTION INTRAVENOUS at 09:00

## 2023-12-21 NOTE — PLAN OF CARE
Problem: Discharge Planning  Goal: Discharge to home or other facility with appropriate resources  Outcome: Progressing     Problem: Skin/Tissue Integrity  Goal: Absence of new skin breakdown  Description: 1. Monitor for areas of redness and/or skin breakdown  2. Assess vascular access sites hourly  3. Every 4-6 hours minimum:  Change oxygen saturation probe site  4. Every 4-6 hours:  If on nasal continuous positive airway pressure, respiratory therapy assess nares and determine need for appliance change or resting period.   Outcome: Progressing     Problem: Safety - Adult  Goal: Free from fall injury  12/20/2023 2337 by Lisa Shah RN  Outcome: Progressing  12/20/2023 1306 by Dayday Watkins RN  Outcome: Progressing     Problem: ABCDS Injury Assessment  Goal: Absence of physical injury  Outcome: Progressing     Problem: Nutrition Deficit:  Goal: Optimize nutritional status  12/20/2023 2337 by Lisa Shah RN  Outcome: Progressing  12/20/2023 1111 by Sukhwinder Nevarez RD, CNSC, LD  Flowsheets (Taken 12/20/2023 1111)  Nutrient intake appropriate for improving, restoring, or maintaining nutritional needs:   Assess nutritional status and recommend course of action   Monitor oral intake, labs, and treatment plans   Recommend appropriate diets, oral nutritional supplements, and vitamin/mineral supplements

## 2023-12-21 NOTE — PLAN OF CARE
Problem: Discharge Planning  Goal: Discharge to home or other facility with appropriate resources  12/21/2023 1308 by Ebonie Arriola RN  Outcome: Progressing  12/20/2023 2337 by Paulina Fletcher RN  Outcome: Progressing     Problem: Skin/Tissue Integrity  Goal: Absence of new skin breakdown  Description: 1. Monitor for areas of redness and/or skin breakdown  2. Assess vascular access sites hourly  3. Every 4-6 hours minimum:  Change oxygen saturation probe site  4. Every 4-6 hours:  If on nasal continuous positive airway pressure, respiratory therapy assess nares and determine need for appliance change or resting period.   12/21/2023 1308 by Ebonie Arriola RN  Outcome: Progressing  12/20/2023 2337 by Paulina Fletcher RN  Outcome: Progressing     Problem: Safety - Adult  Goal: Free from fall injury  12/21/2023 1308 by Ebonie Arriola RN  Outcome: Progressing  12/20/2023 2337 by Paulina Fletcher RN  Outcome: Progressing     Problem: ABCDS Injury Assessment  Goal: Absence of physical injury  12/21/2023 1308 by Ebonie Arriola RN  Outcome: Progressing  12/20/2023 2337 by Paulina Fletcher RN  Outcome: Progressing     Problem: Nutrition Deficit:  Goal: Optimize nutritional status  12/20/2023 2337 by Paulina Fletcher RN  Outcome: Progressing

## 2023-12-21 NOTE — PROGRESS NOTES
in regards to this    12/21/2023  Vital signs stable  Passed swallow study - Easy to chew consistency solids  Continue home Lantus ER 15 mg  Patient is very frail appearing  Patient is medically stable for discharge to facility once arrangements have been made. Code status: Full  Consultants:      DVT Prophylaxis   PT/OT  Discharge planning       I have spent a total time of 30 minutes of this patient encounter reviewing chart, labs, coordinating care with interdisciplinary teams, face to face encounter with patient, providing counseling/education to patient/family.       Alfredo Morrissey, ENRICO - CNP,  3:20 PM  12/21/2023

## 2023-12-21 NOTE — CARE COORDINATION
12/21/2023  Social Work Discharge Planning: Plan is to go to [facility]. Waiting for auth. DENI,7000 and transport form are completed.  Electronically signed by SUSAN Sanchez on 12/21/2023 at 8:48 AM

## 2023-12-21 NOTE — PROGRESS NOTES
Spoke with GREG Santacruz dc video swallow - pt ok to continue easy to chew diet. Video not needed. If auth received, patient ok to dc.      Electronically signed by Eddie Hernandez RN on 12/21/2023 at 3:32 PM

## 2023-12-22 VITALS
HEART RATE: 67 BPM | HEIGHT: 62 IN | RESPIRATION RATE: 16 BRPM | BODY MASS INDEX: 11.76 KG/M2 | WEIGHT: 63.9 LBS | DIASTOLIC BLOOD PRESSURE: 67 MMHG | OXYGEN SATURATION: 98 % | TEMPERATURE: 98.6 F | SYSTOLIC BLOOD PRESSURE: 129 MMHG

## 2023-12-22 LAB
ALBUMIN SERPL-MCNC: 3.1 G/DL (ref 3.5–5.2)
ALP SERPL-CCNC: 50 U/L (ref 35–104)
ALT SERPL-CCNC: 15 U/L (ref 0–32)
ANION GAP SERPL CALCULATED.3IONS-SCNC: 9 MMOL/L (ref 7–16)
AST SERPL-CCNC: 18 U/L (ref 0–31)
BASOPHILS # BLD: 0.02 K/UL (ref 0–0.2)
BASOPHILS NFR BLD: 1 % (ref 0–2)
BILIRUB SERPL-MCNC: 0.4 MG/DL (ref 0–1.2)
BUN SERPL-MCNC: 22 MG/DL (ref 6–23)
CALCIUM SERPL-MCNC: 9.2 MG/DL (ref 8.6–10.2)
CHLORIDE SERPL-SCNC: 101 MMOL/L (ref 98–107)
CO2 SERPL-SCNC: 26 MMOL/L (ref 22–29)
CREAT SERPL-MCNC: 0.9 MG/DL (ref 0.5–1)
EOSINOPHIL # BLD: 0.06 K/UL (ref 0.05–0.5)
EOSINOPHILS RELATIVE PERCENT: 2 % (ref 0–6)
ERYTHROCYTE [DISTWIDTH] IN BLOOD BY AUTOMATED COUNT: 15.6 % (ref 11.5–15)
GFR SERPL CREATININE-BSD FRML MDRD: >60 ML/MIN/1.73M2
GLUCOSE SERPL-MCNC: 72 MG/DL (ref 74–99)
HCT VFR BLD AUTO: 35.4 % (ref 34–48)
HGB BLD-MCNC: 12 G/DL (ref 11.5–15.5)
IMM GRANULOCYTES # BLD AUTO: <0.03 K/UL (ref 0–0.58)
IMM GRANULOCYTES NFR BLD: 0 % (ref 0–5)
LYMPHOCYTES NFR BLD: 1.12 K/UL (ref 1.5–4)
LYMPHOCYTES RELATIVE PERCENT: 30 % (ref 20–42)
MCH RBC QN AUTO: 31.3 PG (ref 26–35)
MCHC RBC AUTO-ENTMCNC: 33.9 G/DL (ref 32–34.5)
MCV RBC AUTO: 92.4 FL (ref 80–99.9)
MONOCYTES NFR BLD: 0.24 K/UL (ref 0.1–0.95)
MONOCYTES NFR BLD: 7 % (ref 2–12)
NEUTROPHILS NFR BLD: 61 % (ref 43–80)
NEUTS SEG NFR BLD: 2.26 K/UL (ref 1.8–7.3)
PLATELET CONFIRMATION: NORMAL
PLATELET, FLUORESCENCE: 96 K/UL (ref 130–450)
PMV BLD AUTO: 10.2 FL (ref 7–12)
POTASSIUM SERPL-SCNC: 3.9 MMOL/L (ref 3.5–5)
PROT SERPL-MCNC: 5.3 G/DL (ref 6.4–8.3)
RBC # BLD AUTO: 3.83 M/UL (ref 3.5–5.5)
SODIUM SERPL-SCNC: 136 MMOL/L (ref 132–146)
WBC OTHER # BLD: 3.7 K/UL (ref 4.5–11.5)

## 2023-12-22 PROCEDURE — 80053 COMPREHEN METABOLIC PANEL: CPT

## 2023-12-22 PROCEDURE — 6370000000 HC RX 637 (ALT 250 FOR IP): Performed by: FAMILY MEDICINE

## 2023-12-22 PROCEDURE — 2580000003 HC RX 258: Performed by: FAMILY MEDICINE

## 2023-12-22 PROCEDURE — 85025 COMPLETE CBC W/AUTO DIFF WBC: CPT

## 2023-12-22 PROCEDURE — 6360000002 HC RX W HCPCS: Performed by: FAMILY MEDICINE

## 2023-12-22 RX ORDER — PANTOPRAZOLE SODIUM 40 MG/1
40 TABLET, DELAYED RELEASE ORAL
Qty: 30 TABLET | Refills: 3 | Status: SHIPPED | OUTPATIENT
Start: 2023-12-22

## 2023-12-22 RX ORDER — OXYCODONE HYDROCHLORIDE 5 MG/1
15 TABLET ORAL EVERY 4 HOURS PRN
Qty: 18 TABLET | Refills: 0 | Status: SHIPPED | OUTPATIENT
Start: 2023-12-22 | End: 2023-12-25

## 2023-12-22 RX ORDER — SUCRALFATE 1 G/1
1 TABLET ORAL
Qty: 120 TABLET | Refills: 3 | Status: SHIPPED | OUTPATIENT
Start: 2023-12-22

## 2023-12-22 RX ADMIN — SUCRALFATE 1 G: 1 TABLET ORAL at 08:42

## 2023-12-22 RX ADMIN — SODIUM CHLORIDE, PRESERVATIVE FREE 10 ML: 5 INJECTION INTRAVENOUS at 08:47

## 2023-12-22 RX ADMIN — PANTOPRAZOLE SODIUM 40 MG: 40 TABLET, DELAYED RELEASE ORAL at 08:42

## 2023-12-22 RX ADMIN — HEPARIN SODIUM 5000 UNITS: 10000 INJECTION INTRAVENOUS; SUBCUTANEOUS at 08:42

## 2023-12-22 NOTE — CARE COORDINATION
12/22/2023  Social Work Discharge Planning:Pt to go to [facility] at discharge. Per liaison, she can go today. DENI,7000 and transport form are completed.  Electronically signed by SUSAN Montanez on 12/22/2023 at 9:08 AM

## 2023-12-22 NOTE — CARE COORDINATION
Per Alyssa Chavez pt approved for [facility]. Per Audrey Goodell NP . Plan is to discharge today. Facility to set up wheelchair transport. Await time. St[facility]. Facility to transport 12 noon today. facility/ staff/ pt aware. [facility].

## 2023-12-22 NOTE — DISCHARGE SUMMARY
[facility]   Discharge summary   Patient ID:  Julisa Hernandez  [id number]  75 y.o.  1950    Admit date: 12/19/2023    Discharge date and time: 12/22/2023    Admission Diagnoses:   Patient Active Problem List   Diagnosis    Lumbar compression fracture, closed, initial encounter ([address])    Constipation    Unable to care for self    Dysphagia    Severe protein-calorie malnutrition ([address])    Failure to thrive in adult       Discharge Diagnoses: Dysphagia    Consults: none    Procedures: none    Hospital Course:     Patient is a 78-year-old female admitted to [address] with telemetry for  Failure to thrive likely secondary to dysphagia  -Monitor labs  -Dietitian consult  -PT OT  -Speech evaluation-modified diet recommended and orders placed  -Nutrition supplements for breakfast lunch and dinner  -Trial Mucinex to help with what she describes as mucus in her throat keeping her from swallowing     ? Active with hospice  -Active with hospice for pain management  -Continue home Oxy IR 15 mg every 4 hours as needed  -She is not interested in PEG tube placement at this time-not willing to have discussion in regards to this     12/21/2023  Vital signs stable  Passed swallow study - Easy to chew consistency solids  Continue home Lantus ER 15 mg  Patient is very frail appearing  Patient is medically stable for discharge to facility once arrangements have been made.         12/22/23:  -Accepted to facility for further rehab  -Modified diet on discharge  -Patient is discharged in stable condition    Recent Labs     12/19/23 1933 12/21/23  0716 12/22/23  0315   WBC 6.2 5.0 3.7*   HGB 13.4 11.8 12.0   HCT 40.3 35.1 35.4       Recent Labs     12/19/23 1933 12/21/23  0716 12/22/23  0315    138 136   K 4.1 3.6 3.9   CL 99 102 101   CO2 28 27 26   BUN 23 22 22   CREATININE 0.9 0.9 0.9   CALCIUM 9.8 8.8 9.2       CT LUMBAR SPINE WO CONTRAST    Result Date: 12/19/2023  EXAMINATION: CT OF THE LUMBAR SPINE WITHOUT

## 2023-12-28 LAB
ALBUMIN SERPL-MCNC: 3 G/DL (ref 3.5–5.2)
ALP SERPL-CCNC: 44 U/L (ref 35–104)
ALT SERPL-CCNC: 14 U/L (ref 0–32)
ANION GAP SERPL CALCULATED.3IONS-SCNC: 8 MMOL/L (ref 7–16)
AST SERPL-CCNC: 16 U/L (ref 0–31)
BASOPHILS # BLD: 0.03 K/UL (ref 0–0.2)
BASOPHILS NFR BLD: 1 % (ref 0–2)
BILIRUB SERPL-MCNC: 0.3 MG/DL (ref 0–1.2)
BUN SERPL-MCNC: 16 MG/DL (ref 6–23)
CALCIUM SERPL-MCNC: 8.9 MG/DL (ref 8.6–10.2)
CHLORIDE SERPL-SCNC: 104 MMOL/L (ref 98–107)
CO2 SERPL-SCNC: 30 MMOL/L (ref 22–29)
CREAT SERPL-MCNC: 0.8 MG/DL (ref 0.5–1)
EOSINOPHIL # BLD: 0.06 K/UL (ref 0.05–0.5)
EOSINOPHILS RELATIVE PERCENT: 2 % (ref 0–6)
ERYTHROCYTE [DISTWIDTH] IN BLOOD BY AUTOMATED COUNT: 15.9 % (ref 11.5–15)
GFR SERPL CREATININE-BSD FRML MDRD: >60 ML/MIN/1.73M2
GLUCOSE SERPL-MCNC: 61 MG/DL (ref 74–99)
HCT VFR BLD AUTO: 33.6 % (ref 34–48)
HGB BLD-MCNC: 11.2 G/DL (ref 11.5–15.5)
IMM GRANULOCYTES # BLD AUTO: <0.03 K/UL (ref 0–0.58)
IMM GRANULOCYTES NFR BLD: 0 % (ref 0–5)
LYMPHOCYTES NFR BLD: 0.95 K/UL (ref 1.5–4)
LYMPHOCYTES RELATIVE PERCENT: 30 % (ref 20–42)
MCH RBC QN AUTO: 31.7 PG (ref 26–35)
MCHC RBC AUTO-ENTMCNC: 33.3 G/DL (ref 32–34.5)
MCV RBC AUTO: 95.2 FL (ref 80–99.9)
MONOCYTES NFR BLD: 0.29 K/UL (ref 0.1–0.95)
MONOCYTES NFR BLD: 9 % (ref 2–12)
NEUTROPHILS NFR BLD: 57 % (ref 43–80)
NEUTS SEG NFR BLD: 1.78 K/UL (ref 1.8–7.3)
PLATELET, FLUORESCENCE: 110 K/UL (ref 130–450)
PMV BLD AUTO: 10.7 FL (ref 7–12)
POTASSIUM SERPL-SCNC: 3.7 MMOL/L (ref 3.5–5)
PROT SERPL-MCNC: 5.1 G/DL (ref 6.4–8.3)
RBC # BLD AUTO: 3.53 M/UL (ref 3.5–5.5)
SODIUM SERPL-SCNC: 142 MMOL/L (ref 132–146)
WBC OTHER # BLD: 3.1 K/UL (ref 4.5–11.5)

## 2024-01-04 ENCOUNTER — APPOINTMENT (OUTPATIENT)
Dept: CT IMAGING | Age: 74
End: 2024-01-04
Payer: MEDICARE

## 2024-01-04 ENCOUNTER — HOSPITAL ENCOUNTER (EMERGENCY)
Age: 74
Discharge: HOME | End: 2024-01-05
Attending: EMERGENCY MEDICINE
Payer: MEDICARE

## 2024-01-04 ENCOUNTER — APPOINTMENT (OUTPATIENT)
Dept: GENERAL RADIOLOGY | Age: 74
End: 2024-01-04
Payer: MEDICARE

## 2024-01-04 DIAGNOSIS — Z51.5 ENCOUNTER FOR ADMISSION TO HOSPICE CARE: ICD-10-CM

## 2024-01-04 DIAGNOSIS — R41.82 ALTERED MENTAL STATUS, UNSPECIFIED ALTERED MENTAL STATUS TYPE: Primary | ICD-10-CM

## 2024-01-04 DIAGNOSIS — Z71.89 DNR (DO NOT RESUSCITATE) DISCUSSION: ICD-10-CM

## 2024-01-04 LAB
ANION GAP SERPL CALCULATED.3IONS-SCNC: 10 MMOL/L (ref 7–16)
BASOPHILS # BLD: 0.02 K/UL (ref 0–0.2)
BASOPHILS NFR BLD: 1 % (ref 0–2)
BILIRUB UR QL STRIP: NEGATIVE
BUN SERPL-MCNC: 15 MG/DL (ref 6–23)
CALCIUM SERPL-MCNC: 9.8 MG/DL (ref 8.6–10.2)
CHLORIDE SERPL-SCNC: 102 MMOL/L (ref 98–107)
CLARITY UR: CLEAR
CO2 SERPL-SCNC: 28 MMOL/L (ref 22–29)
COLOR UR: YELLOW
COMMENT: NORMAL
CREAT SERPL-MCNC: 0.7 MG/DL (ref 0.5–1)
EOSINOPHIL # BLD: 0.02 K/UL (ref 0.05–0.5)
EOSINOPHILS RELATIVE PERCENT: 1 % (ref 0–6)
ERYTHROCYTE [DISTWIDTH] IN BLOOD BY AUTOMATED COUNT: 16.1 % (ref 11.5–15)
GFR SERPL CREATININE-BSD FRML MDRD: >60 ML/MIN/1.73M2
GLUCOSE BLD-MCNC: 113 MG/DL (ref 74–99)
GLUCOSE SERPL-MCNC: 72 MG/DL (ref 74–99)
GLUCOSE UR STRIP-MCNC: NEGATIVE MG/DL
HCT VFR BLD AUTO: 34.3 % (ref 34–48)
HGB BLD-MCNC: 11.6 G/DL (ref 11.5–15.5)
HGB UR QL STRIP.AUTO: NEGATIVE
IMM GRANULOCYTES # BLD AUTO: <0.03 K/UL (ref 0–0.58)
IMM GRANULOCYTES NFR BLD: 1 % (ref 0–5)
INFLUENZA A BY PCR: NOT DETECTED
INFLUENZA B BY PCR: NOT DETECTED
KETONES UR STRIP-MCNC: NEGATIVE MG/DL
LEUKOCYTE ESTERASE UR QL STRIP: NEGATIVE
LYMPHOCYTES NFR BLD: 0.5 K/UL (ref 1.5–4)
LYMPHOCYTES RELATIVE PERCENT: 15 % (ref 20–42)
MCH RBC QN AUTO: 31.5 PG (ref 26–35)
MCHC RBC AUTO-ENTMCNC: 33.8 G/DL (ref 32–34.5)
MCV RBC AUTO: 93.2 FL (ref 80–99.9)
MONOCYTES NFR BLD: 0.29 K/UL (ref 0.1–0.95)
MONOCYTES NFR BLD: 9 % (ref 2–12)
NEUTROPHILS NFR BLD: 75 % (ref 43–80)
NEUTS SEG NFR BLD: 2.51 K/UL (ref 1.8–7.3)
NITRITE UR QL STRIP: NEGATIVE
PH UR STRIP: 7 [PH] (ref 5–9)
PLATELET # BLD AUTO: 161 K/UL (ref 130–450)
PMV BLD AUTO: 9.7 FL (ref 7–12)
POTASSIUM SERPL-SCNC: 4.3 MMOL/L (ref 3.5–5)
PROT UR STRIP-MCNC: NEGATIVE MG/DL
RBC # BLD AUTO: 3.68 M/UL (ref 3.5–5.5)
RBC # BLD: ABNORMAL 10*6/UL
SARS-COV-2 RDRP RESP QL NAA+PROBE: NOT DETECTED
SODIUM SERPL-SCNC: 140 MMOL/L (ref 132–146)
SP GR UR STRIP: 1.01 (ref 1–1.03)
SPECIMEN DESCRIPTION: NORMAL
UROBILINOGEN UR STRIP-ACNC: 1 EU/DL (ref 0–1)
WBC OTHER # BLD: 3.4 K/UL (ref 4.5–11.5)

## 2024-01-04 PROCEDURE — 70450 CT HEAD/BRAIN W/O DYE: CPT

## 2024-01-04 PROCEDURE — 85025 COMPLETE CBC W/AUTO DIFF WBC: CPT

## 2024-01-04 PROCEDURE — 82962 GLUCOSE BLOOD TEST: CPT

## 2024-01-04 PROCEDURE — 51701 INSERT BLADDER CATHETER: CPT

## 2024-01-04 PROCEDURE — 93005 ELECTROCARDIOGRAM TRACING: CPT | Performed by: EMERGENCY MEDICINE

## 2024-01-04 PROCEDURE — 87635 SARS-COV-2 COVID-19 AMP PRB: CPT

## 2024-01-04 PROCEDURE — 99284 EMERGENCY DEPT VISIT MOD MDM: CPT

## 2024-01-04 PROCEDURE — 2580000003 HC RX 258: Performed by: EMERGENCY MEDICINE

## 2024-01-04 PROCEDURE — 87502 INFLUENZA DNA AMP PROBE: CPT

## 2024-01-04 PROCEDURE — 96372 THER/PROPH/DIAG INJ SC/IM: CPT

## 2024-01-04 PROCEDURE — 80048 BASIC METABOLIC PNL TOTAL CA: CPT

## 2024-01-04 PROCEDURE — 71045 X-RAY EXAM CHEST 1 VIEW: CPT

## 2024-01-04 PROCEDURE — 81003 URINALYSIS AUTO W/O SCOPE: CPT

## 2024-01-04 PROCEDURE — 6360000002 HC RX W HCPCS: Performed by: EMERGENCY MEDICINE

## 2024-01-04 RX ORDER — MORPHINE SULFATE 100 MG/5ML
5 SOLUTION ORAL
Qty: 18 ML | Refills: 0 | Status: SHIPPED | OUTPATIENT
Start: 2024-01-04 | End: 2024-01-07

## 2024-01-04 RX ORDER — LORAZEPAM 2 MG/ML
0.5 INJECTION INTRAMUSCULAR ONCE
Status: COMPLETED | OUTPATIENT
Start: 2024-01-04 | End: 2024-01-04

## 2024-01-04 RX ORDER — 0.9 % SODIUM CHLORIDE 0.9 %
500 INTRAVENOUS SOLUTION INTRAVENOUS ONCE
Status: COMPLETED | OUTPATIENT
Start: 2024-01-04 | End: 2024-01-04

## 2024-01-04 RX ORDER — OXYCODONE HYDROCHLORIDE 5 MG/1
10 TABLET ORAL ONCE
Status: DISCONTINUED | OUTPATIENT
Start: 2024-01-04 | End: 2024-01-05 | Stop reason: HOSPADM

## 2024-01-04 RX ADMIN — SODIUM CHLORIDE 500 ML: 9 INJECTION, SOLUTION INTRAVENOUS at 11:24

## 2024-01-04 RX ADMIN — LORAZEPAM 0.5 MG: 2 INJECTION INTRAMUSCULAR; INTRAVENOUS at 08:47

## 2024-01-04 ASSESSMENT — ENCOUNTER SYMPTOMS
EYE PAIN: 0
EYE REDNESS: 0
WHEEZING: 0
SINUS PRESSURE: 0
COUGH: 0
EYE DISCHARGE: 0
SORE THROAT: 0
SHORTNESS OF BREATH: 0
BACK PAIN: 0
NAUSEA: 0
ABDOMINAL DISTENTION: 0
DIARRHEA: 0
VOMITING: 0

## 2024-01-04 NOTE — CARE COORDINATION
Social Work/Transition of Care:    Pt presents from [facility] due to increased AMS.  Pt was evaluated and family would like to take Home with Hospice, family would like [facility] RN has spoken with Hospice Nurse, pt can be discharged home and agency will meet with pt in the AM for a face to face due to pt has been a referral twice before and pt withdrew Hospice Services.  Pts spouse at bedside and reports he notified [facility] pt will not be returning, spouse reports he feels comfortable with pt returning home.   SW called [facility] due to pt comfort care ETA 3 hours.    Electronically signed by SUSAN isaacs on 1/4/2024 at 7:44 PM

## 2024-01-04 NOTE — ED PROVIDER NOTES
73-year-old female history of altered mental status presents to the emergency department after a fall at the nursing home which nursing home states she is more altered.  They also reported that they thought she was hypoxic but her fingers were cold according to EMS when they got her pulse ox hooked up on their monitor she was nonhypoxic off oxygen.  Patient is unable to give any history due to altered mental status she is repeating everything the EMS provider is saying and would not provide a history to me when I ask her questions.  She states no other complaints at this time.  According to EMS the patient had been on hospice but that was changed and now she was in rehab for evaluation    The history is provided by the patient.   Altered Mental Status  Presenting symptoms: behavior changes, combativeness and confusion    Severity:  Mild  Most recent episode:  Today  Episode history:  Single  Duration:  1 day  Timing:  Intermittent  Progression:  Waxing and waning  Chronicity:  New  Context: head injury    Associated symptoms: no fever, no headaches, no nausea, no rash, no vomiting and no weakness         Review of Systems   Constitutional:  Negative for chills and fever.   HENT:  Negative for ear pain, sinus pressure and sore throat.    Eyes:  Negative for pain, discharge and redness.   Respiratory:  Negative for cough, shortness of breath and wheezing.    Cardiovascular:  Negative for chest pain.   Gastrointestinal:  Negative for abdominal distention, diarrhea, nausea and vomiting.   Genitourinary:  Negative for dysuria and frequency.   Musculoskeletal:  Negative for arthralgias and back pain.   Skin:  Negative for rash and wound.   Neurological:  Negative for weakness and headaches.   Hematological:  Negative for adenopathy.   Psychiatric/Behavioral:  Positive for confusion.    All other systems reviewed and are negative.       Physical Exam  Constitutional:       Appearance: Normal appearance. She is cachectic.  altered mental status type    2. Encounter for admission to hospice care    3. DNR (do not resuscitate) discussion        Disposition:  Patient's disposition: Discharge to home  Patient's condition is stable.       Shimon Maya DO  01/06/24 0624

## 2024-01-05 VITALS
HEIGHT: 62 IN | SYSTOLIC BLOOD PRESSURE: 93 MMHG | RESPIRATION RATE: 15 BRPM | HEART RATE: 58 BPM | OXYGEN SATURATION: 97 % | BODY MASS INDEX: 11.04 KG/M2 | TEMPERATURE: 97.5 F | DIASTOLIC BLOOD PRESSURE: 65 MMHG | WEIGHT: 60 LBS

## 2024-01-08 LAB
EKG ATRIAL RATE: 92 BPM
EKG P AXIS: 89 DEGREES
EKG P-R INTERVAL: 142 MS
EKG Q-T INTERVAL: 320 MS
EKG QRS DURATION: 44 MS
EKG QTC CALCULATION (BAZETT): 395 MS
EKG R AXIS: 83 DEGREES
EKG T AXIS: -94 DEGREES
EKG VENTRICULAR RATE: 92 BPM

## 2024-01-08 PROCEDURE — 93010 ELECTROCARDIOGRAM REPORT: CPT | Performed by: INTERNAL MEDICINE

## (undated) DEVICE — Z INACTIVE USE 2855131 SPONGE GZ W4XL4IN RAYON POLY FILL CVR W/ NONWOVEN FAB

## (undated) DEVICE — DEFENDO AIR WATER SUCTION AND BIOPSY VALVE KIT FOR  OLYMPUS: Brand: DEFENDO AIR/WATER/SUCTION AND BIOPSY VALVE

## (undated) DEVICE — BITEBLOCK 54FR W/ DENT RIM BLOX

## (undated) DEVICE — Z DISCONTINUED NO SUB IDED TUBING ETCO2 AD L6.5FT NSL ORAL CVD PRNG NONFLARED TIP OVR

## (undated) DEVICE — CONTAINER SPEC 60ML PH 7NEUTRAL BUFF FRMLN RDY TO USE

## (undated) DEVICE — FORCEPS BX L160CM JAW DIA2.4MM YEL L CAP W/ NDL DISP RAD